# Patient Record
Sex: FEMALE | Race: WHITE | NOT HISPANIC OR LATINO | Employment: FULL TIME | URBAN - METROPOLITAN AREA
[De-identification: names, ages, dates, MRNs, and addresses within clinical notes are randomized per-mention and may not be internally consistent; named-entity substitution may affect disease eponyms.]

---

## 2017-01-05 ENCOUNTER — ALLSCRIPTS OFFICE VISIT (OUTPATIENT)
Dept: OTHER | Facility: OTHER | Age: 37
End: 2017-01-05

## 2017-03-08 ENCOUNTER — ALLSCRIPTS OFFICE VISIT (OUTPATIENT)
Dept: OTHER | Facility: OTHER | Age: 37
End: 2017-03-08

## 2017-03-08 DIAGNOSIS — R22.31 LOCALIZED SWELLING, MASS AND LUMP, RIGHT UPPER LIMB: ICD-10-CM

## 2017-03-08 DIAGNOSIS — Z12.39 ENCOUNTER FOR OTHER SCREENING FOR MALIGNANT NEOPLASM OF BREAST: ICD-10-CM

## 2017-11-29 ENCOUNTER — ALLSCRIPTS OFFICE VISIT (OUTPATIENT)
Dept: OTHER | Facility: OTHER | Age: 37
End: 2017-11-29

## 2017-11-29 ENCOUNTER — GENERIC CONVERSION - ENCOUNTER (OUTPATIENT)
Dept: OTHER | Facility: OTHER | Age: 37
End: 2017-11-29

## 2017-11-29 DIAGNOSIS — R20.8 OTHER DISTURBANCES OF SKIN SENSATION: ICD-10-CM

## 2017-11-29 DIAGNOSIS — R22.31 LOCALIZED SWELLING, MASS AND LUMP, RIGHT UPPER LIMB: ICD-10-CM

## 2017-11-30 ENCOUNTER — HOSPITAL ENCOUNTER (OUTPATIENT)
Dept: ULTRASOUND IMAGING | Facility: CLINIC | Age: 37
Discharge: HOME | End: 2017-11-30
Payer: COMMERCIAL

## 2017-11-30 ENCOUNTER — HOSPITAL ENCOUNTER (OUTPATIENT)
Dept: MAMMOGRAPHY | Facility: CLINIC | Age: 37
Discharge: HOME | End: 2017-11-30
Payer: COMMERCIAL

## 2017-11-30 DIAGNOSIS — R22.31 LOCALIZED SWELLING, MASS AND LUMP, RIGHT UPPER LIMB: ICD-10-CM

## 2017-11-30 DIAGNOSIS — Z12.39 ENCOUNTER FOR OTHER SCREENING FOR MALIGNANT NEOPLASM OF BREAST: ICD-10-CM

## 2017-11-30 PROCEDURE — G0204 DX MAMMO INCL CAD BI: HCPCS

## 2017-11-30 PROCEDURE — G0279 TOMOSYNTHESIS, MAMMO: HCPCS

## 2017-11-30 PROCEDURE — 76642 ULTRASOUND BREAST LIMITED: CPT
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
## 2017-12-08 ENCOUNTER — HOSPITAL ENCOUNTER (OUTPATIENT)
Dept: ULTRASOUND IMAGING | Facility: HOSPITAL | Age: 37
Discharge: HOME | End: 2017-12-08
Payer: COMMERCIAL

## 2017-12-08 DIAGNOSIS — R20.8 OTHER DISTURBANCES OF SKIN SENSATION: ICD-10-CM

## 2017-12-08 PROCEDURE — 76705 ECHO EXAM OF ABDOMEN: CPT

## 2017-12-11 ENCOUNTER — GENERIC CONVERSION - ENCOUNTER (OUTPATIENT)
Dept: OTHER | Facility: OTHER | Age: 37
End: 2017-12-11

## 2018-01-03 ENCOUNTER — ALLSCRIPTS OFFICE VISIT (OUTPATIENT)
Dept: OTHER | Facility: OTHER | Age: 38
End: 2018-01-03
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
## 2018-01-10 NOTE — MISCELLANEOUS
Message   Recorded as Task   Date: 11/29/2017 11:42 AM, Created By: Viktoria Chiang   Task Name: Follow Up   Assigned To: Viktoria Chiang   Regarding Patient: Radha Fowler, Status: Complete   Comment:    Viktoria Chiang - 29 Nov 2017 11:42 AM     TASK CREATED  Pt has an appt for screening mammo and r u/s tomorrow at  [facility]  Carlos Santana (at  [facility]) wants to know if I can change it to diag, bilat mammo  ? You did see pt for this - back in Mar  May I change rx? This was ordered 7 mos ago  Thanks   Katelyn Scott - 29 Nov 2017 12:27 PM     TASK REPLIED TO: Previously Assigned To Katelyn Scott  yes it's okay to change it  thx   Viktoria Chiang - 29 Nov 2017 12:41 PM     TASK EDITED  Rx for bilat diag mammo sent to  [facility]   Viktoria Chiang - 29 Nov 2017 12:41 PM     TASK COMPLETED        Active Problems    1  Allergic rhinitis (477 9) (J30 9)   2  Allergy (995 3) (T78 40XA)   3  Asthma (493 90) (J45 909)   4  Axillary lump, right (782 2) (R22 31)   5  Breast cancer screening, high risk patient (V76 11) (Z12 31)   6  Encounter for gynecological examination without abnormal finding (V72 31) (Z01 419)   7  Herpes simplex infection (054 9) (B00 9)   8  Irregular bleeding (626 4) (N92 6)   9  Pain at surgical incision (782 0) (R20 8)   10  Pruritus of skin (698 9) (L29 9)   11  Skin tag of vulva (624 8) (N90 89)    Current Meds   1  Advair Diskus 250-50 MCG/DOSE Inhalation Aerosol Powder Breath Activated; AEPB   Inhal X 90;   Therapy: 49IAS8464 to (Evaluate:16Nov2016)  Requested for: 64DPH5692; Last   Rx:40Unu7776 Ordered    Allergies    1  Demerol SOLN    2  Other   3  Pollen   4  Seasonal    Plan  Axillary lump, right    · MAMMO DIAGNOSTIC BILATERAL W CAD; Status:Hold For - Scheduling,Retrospective  Authorization; Requested for:29Nov2017;     Signatures   Electronically signed by :  Elvis [place] [employer], ; Nov 29 2017 12:44PM EST                       (Author)

## 2018-01-12 VITALS — DIASTOLIC BLOOD PRESSURE: 70 MMHG | SYSTOLIC BLOOD PRESSURE: 100 MMHG

## 2018-01-14 VITALS — DIASTOLIC BLOOD PRESSURE: 72 MMHG | SYSTOLIC BLOOD PRESSURE: 120 MMHG

## 2018-01-22 ENCOUNTER — GENERIC CONVERSION - ENCOUNTER (OUTPATIENT)
Dept: OTHER | Facility: OTHER | Age: 38
End: 2018-01-22

## 2018-01-22 VITALS — SYSTOLIC BLOOD PRESSURE: 120 MMHG | DIASTOLIC BLOOD PRESSURE: 66 MMHG | HEIGHT: 66 IN

## 2018-01-23 NOTE — MISCELLANEOUS
Message   Recorded as Task   Date: 12/11/2017 07:34 AM, Created By: Cameron Nieto   Task Name: Go to Result   Assigned To: Tito Rodriguez   Regarding Patient: Leydi Fitzpatrick, Status: In Progress   Comment:    Katelyn Scott - 11 Dec 2017 7:34 AM     TASK CREATED  Please let Vinnie Monroy know that her ultrasound shows no endometrioma at the site of her pain; if she wishes we can bring her in with Dr Mercy Mishra for an injection of her incisional site  Henny Olvera - 11 Dec 2017 7:57 AM     TASK IN PROGRESS   Henny Olvera - 11 Dec 2017 8:14 AM     TASK EDITED  [facility] for pt to cb       ext: [id number]   Henny Olvera - 11 Dec 2017 8:38 AM     TASK EDITED  Patient returned call - she is aware of the u/s result  Is interested in getting a shot at her incisional site  Earliest could get her in was not till 01/03 - ok with pt  Active Problems    1  Allergic rhinitis (477 9) (J30 9)   2  Allergy (995 3) (T78 40XA)   3  Asthma (493 90) (J45 909)   4  Axillary lump, right (782 2) (R22 31)   5  Breast cancer screening, high risk patient (V76 11) (Z12 31)   6  Encounter for gynecological examination without abnormal finding (V72 31) (Z01 419)   7  Herpes simplex infection (054 9) (B00 9)   8  Irregular bleeding (626 4) (N92 6)   9  Pain at surgical incision (782 0) (R20 8)   10  Pruritus of skin (698 9) (L29 9)   11  Skin tag of vulva (624 8) (N90 89)    Current Meds   1  Advair Diskus 250-50 MCG/DOSE Inhalation Aerosol Powder Breath Activated; AEPB   Inhal X 90;   Therapy: 76ATL1124 to (Evaluate:10Vqi5960)  Requested for: 81GFA5703; Last   Rx:[id number] Ordered    Allergies    1  Demerol SOLN    2  Other   3  Pollen   4   Seasonal    Signatures   Electronically signed by : Flores Wick, ; Dec 11 2017  8:38AM EST                       (Author)

## 2018-01-24 NOTE — MISCELLANEOUS
Message   Recorded as Task   Date: 01/17/2018 10:17 AM, Created By: Juanjo Ring   Task Name: Follow Up   Assigned To: Marshal Raya   Regarding Patient: Kieran Johnson, Status: In Progress   Comment:    Juanjo Ring - 17 Jan 2018 10:17 AM     TASK CREATED  On 1/3 pt got inj for pain in c section scar  Pain better for 1 week - now worse than ever  "Pain is different" though  She cannot touch area - seems "on fire"  Constant   No bulges around it - feels no hernia  What to do? [address] you  Martha Dy - 18 Jan 2018 12:16 PM     TASK EDITED  pt called again - has not heard back from us - she said pain was bad yesterday 1/17 - not as bad today but she taking things very slow - would like a return call  Christopher Morse - 19 Jan 2018 10:54 AM     TASK REPLIED TO: Previously Assigned To FID3  I will send RX for anti-inflammatory  Juanjo Ring - 22 Jan 2018 8:54 AM     TASK EDITED   Juanjo Ring - 22 Jan 2018 8:55 AM     TASK IN PROGRESS   Juanjo Ring - 22 Jan 2018 8:58 AM     TASK EDITED  lmtcb   Juanjo Ring - 22 Jan 2018 9:06 AM     TASK EDITED  pt said she is better this week - still has pain  Will try anti inflammatory also        Active Problems    1  Allergic rhinitis (477 9) (J30 9)   2  Allergy (995 3) (T78 40XA)   3  Asthma (493 90) (J45 909)   4  Axillary lump, right (782 2) (R22 31)   5  Breast cancer screening, high risk patient (V76 11) (Z12 31)   6  Encounter for gynecological examination without abnormal finding (V72 31) (Z01 419)   7  Herpes simplex infection (054 9) (B00 9)   8  Irregular bleeding (626 4) (N92 6)   9  Neurapraxia (957 9) (T14 8XXA)   10  Pain at surgical incision (782 0) (R20 8)   11  Pruritus of skin (698 9) (L29 9)   12  Skin tag of vulva (624 8) (N90 89)    Current Meds   1   Advair Diskus 250-50 MCG/DOSE Inhalation Aerosol Powder Breath Activated; AEPB   Inhal X 90;   Therapy: 92CSY1260 to (Evaluate:96Fab3670)  Requested for: 42PDH1698; Last   Rx:67Cyj3405 Ordered   2  MethylPREDNISolone Sodium Succ 40 MG Injection Solution Reconstituted   (SOLU-medrol); USE AS DIRECTED; To Be Done: 55DXZ0962; Status: HOLD   FOR - Administration Ordered   3  Naproxen 500 MG Oral Tablet; TAKE 1 TABLET EVERY 12 HOURS AS NEEDED; Therapy: 38GIJ7239 to Misael Jerry)  Requested for: 74QAP1261; Last   Rx:19Jan2018 Ordered    Allergies    1  Demerol SOLN    2  Other   3  Pollen   4  Seasonal    Signatures   Electronically signed by :  Randy Giraldo, ; Jan 22 2018  9:06AM EST                       (Author)

## 2018-02-02 DIAGNOSIS — J45.20 MILD INTERMITTENT ASTHMA WITHOUT COMPLICATION: Primary | ICD-10-CM

## 2018-02-02 RX ORDER — NAPROXEN 500 MG/1
1 TABLET ORAL EVERY 12 HOURS PRN
COMMUNITY
Start: 2018-01-19 | End: 2018-03-26 | Stop reason: SDUPTHER

## 2018-02-02 RX ORDER — ALBUTEROL SULFATE 2.5 MG/3ML
2.5 SOLUTION RESPIRATORY (INHALATION) EVERY 6 HOURS PRN
Qty: 75 ML | Refills: 0 | OUTPATIENT
Start: 2018-02-02 | End: 2018-05-14

## 2018-02-02 NOTE — TELEPHONE ENCOUNTER
DR POWELL'S Pt   PT HAS NOT BEEN SEEN FOR A YEAR  SHE CALLED THIS AM REQUESTING A REFILL ON HER ALBUTEROL  [place]HE IS GO[place]    CAN WE SEND A MONTH SUPPLY OVER TO [facility] ON [facility]

## 2018-02-21 ENCOUNTER — APPOINTMENT (EMERGENCY)
Dept: RADIOLOGY | Facility: HOSPITAL | Age: 38
End: 2018-02-21
Payer: COMMERCIAL

## 2018-02-21 ENCOUNTER — HOSPITAL ENCOUNTER (EMERGENCY)
Facility: HOSPITAL | Age: 38
Discharge: HOME | End: 2018-02-21
Admitting: EMERGENCY MEDICINE
Payer: COMMERCIAL

## 2018-02-21 VITALS
SYSTOLIC BLOOD PRESSURE: 120 MMHG | TEMPERATURE: 98.8 F | BODY MASS INDEX: 30.83 KG/M2 | DIASTOLIC BLOOD PRESSURE: 59 MMHG | RESPIRATION RATE: 16 BRPM | HEART RATE: 79 BPM | OXYGEN SATURATION: 98 % | WEIGHT: 191 LBS

## 2018-02-21 DIAGNOSIS — S16.1XXA STRAIN OF NECK MUSCLE, INITIAL ENCOUNTER: Primary | ICD-10-CM

## 2018-02-21 PROCEDURE — 99284 EMERGENCY DEPT VISIT MOD MDM: CPT

## 2018-02-21 PROCEDURE — 72080 X-RAY EXAM THORACOLMB 2/> VW: CPT

## 2018-02-21 PROCEDURE — 72040 X-RAY EXAM NECK SPINE 2-3 VW: CPT

## 2018-02-21 RX ORDER — LIDOCAINE 50 MG/G
1 PATCH TOPICAL DAILY
Qty: 10 PATCH | Refills: 0 | Status: SHIPPED | OUTPATIENT
Start: 2018-02-21 | End: 2018-03-26

## 2018-02-21 RX ORDER — ACETAMINOPHEN 325 MG/1
975 TABLET ORAL ONCE
Status: COMPLETED | OUTPATIENT
Start: 2018-02-21 | End: 2018-02-21

## 2018-02-21 RX ORDER — NAPROXEN 500 MG/1
500 TABLET ORAL 2 TIMES DAILY WITH MEALS
Qty: 14 TABLET | Refills: 0 | Status: SHIPPED | OUTPATIENT
Start: 2018-02-21 | End: 2018-03-01 | Stop reason: SDUPTHER

## 2018-02-21 RX ORDER — CYCLOBENZAPRINE HCL 5 MG
5 TABLET ORAL 3 TIMES DAILY PRN
Qty: 15 TABLET | Refills: 0 | Status: SHIPPED | OUTPATIENT
Start: 2018-02-21 | End: 2018-03-01

## 2018-02-21 RX ADMIN — ACETAMINOPHEN 975 MG: 325 TABLET, FILM COATED ORAL at 18:11
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
## 2018-02-22 NOTE — DISCHARGE INSTRUCTIONS
Cervical Strain   WHAT YOU NEED TO KNOW:   A cervical strain is a stretched or torn muscle or tendon in your neck  Tendons are strong tissues that connect muscles to bones  Common causes of cervical strains include a car accident, a fall, or a sports injury  DISCHARGE INSTRUCTIONS:   Return to the emergency department if:   · You have pain or numbness from your shoulder down to your hand  · You have problems with your vision, hearing, or balance  · You feel confused or cannot concentrate  · You have problems with movement and strength  Contact your healthcare provider if:   · You have increased swelling or pain in your neck  · You have questions or concerns about your condition or care  Medicines: You may need any of the following:  · Acetaminophen  decreases pain and fever  It is available without a doctor's order  Ask how much to take and how often to take it  Follow directions  Read the labels of all other medicines you are using to see if they also contain acetaminophen, or ask your doctor or pharmacist  Acetaminophen can cause liver damage if not taken correctly  Do not use more than 4 grams (4,000 milligrams) total of acetaminophen in one day  · NSAIDs , such as ibuprofen, help decrease swelling, pain, and fever  This medicine is available with or without a doctor's order  NSAIDs can cause stomach bleeding or kidney problems in certain people  If you take blood thinner medicine, always ask your healthcare provider if NSAIDs are safe for you  Always read the medicine label and follow directions  · Muscle relaxers  help decrease pain and muscle spasms  · Prescription pain medicine  may be given  Ask your healthcare provider how to take this medicine safely  Some prescription pain medicines contain acetaminophen  Do not take other medicines that contain acetaminophen without talking to your healthcare provider  Too much acetaminophen may cause liver damage   Prescription pain medicine may cause constipation  Ask your healthcare provider how to prevent or treat constipation  · Take your medicine as directed  Contact your healthcare provider if you think your medicine is not helping or if you have side effects  Tell him or her if you are allergic to any medicine  Keep a list of the medicines, vitamins, and herbs you take  Include the amounts, and when and why you take them  Bring the list or the pill bottles to follow-up visits  Carry your medicine list with you in case of an emergency  Manage your symptoms:   · Apply heat  on your neck for 15 to 20 minutes, 4 to 6 times a day or as directed  Heat helps decrease pain, stiffness, and muscle spasms  · Begin gentle neck exercises  as soon as you can move your neck without pain  Exercises will help decrease stiffness and improve the strength and movement of your neck  Ask your healthcare provider what kind of exercises you should do  · Gradually return to your usual activities as directed  Stop if you have pain  Avoid activities that can cause more damage to your neck, such as heavy lifting or strenuous exercise  · Sleep without a pillow  to help decrease pain  Instead, roll a small towel tightly and place it under your neck  · Go to physical therapy as directed  A physical therapist teaches you exercises to help improve movement and strength, and to decrease pain  Prevent neck injury:   · Drive safely  Make sure everyone in your car wears a seatbelt  A seatbelt can save your life if you are in an accident  Do not use your cell phone when you are driving  This could distract you and cause an accident  Pull over if you need to make a call or send a text message  · Wear helmets, lifejackets, and protective gear  Always wear a helmet when you ride a bike or motorcycle, go skiing, or play sports that could cause a head injury  Wear protective equipment when you play sports   Wear a lifejacket when you are on a boat or doing water sports  Follow up with your healthcare provider as directed: You may be referred to an orthopedist or physical therapies  Write down your questions so you remember to ask them during your visits  © 2017 [address] [employer] Information is for End User's use only and may not be sold, redistributed or otherwise used for commercial purposes  All illustrations and images included in CareNotes® are the copyrighted property of [employer]  or [address]  The above information is an  only  It is not intended as medical advice for individual conditions or treatments  Talk to your doctor, nurse or pharmacist before following any medical regimen to see if it is safe and effective for you  Motor Vehicle Accident   WHAT YOU NEED TO KNOW:   A motor vehicle accident (MVA) can cause injury from the impact or from being thrown around inside the car  You may have a bruise on your abdomen, chest, or neck from the seatbelt  You may also have pain in your face, neck, or back  You may have pain in your knee, hip, or thigh if your body hits the dash or the steering wheel  Muscle pain is commonly worse 1 to 2 days after an MVA  DISCHARGE INSTRUCTIONS:   Call 911 if:   · You have new or worsening chest pain or shortness of breath  Return to the emergency department if:   · You have new or worsening pain in your abdomen  · You have nausea and vomiting that does not get better  · You have a severe headache  · You have weakness, tingling, or numbness in your arms or legs  · You have new or worsening pain that makes it hard for you to move  Contact your healthcare provider if:   · You have pain that develops 2 to 3 days after the MVA  · You have questions or concerns about your condition or care  Medicines:   · Pain medicine: You may be given medicine to take away or decrease pain  Do not wait until the pain is severe before you take your medicine      · NSAIDs , such as ibuprofen, help decrease swelling, pain, and fever  This medicine is available with or without a doctor's order  NSAIDs can cause stomach bleeding or kidney problems in certain people  If you take blood thinner medicine, always ask if NSAIDs are safe for you  Always read the medicine label and follow directions  Do not give these medicines to children under 10months of age without direction from your child's healthcare provider  · Take your medicine as directed  Contact your healthcare provider if you think your medicine is not helping or if you have side effects  Tell him of her if you are allergic to any medicine  Keep a list of the medicines, vitamins, and herbs you take  Include the amounts, and when and why you take them  Bring the list or the pill bottles to follow-up visits  Carry your medicine list with you in case of an emergency  Follow up with your healthcare provider as directed:  Write down your questions so you remember to ask them during your visits  Safety tips:   · Always wear your seatbelt  This will help reduce serious injury from an MVA  · Use child safety seats  Your child needs to ride in a child safety seat made for his age, height, and weight  Ask your healthcare provider for more information about child safety seats  · Decrease speed  Drive the speed limit to reduce your risk for an MVA  · Do not drive if you are tired  You will react more slowly when you are tired  The slowed reaction time will increase your risk for an MVA  · Do not talk or text on your cell phone while you drive  You cannot respond fast enough in an emergency if you are distracted by texts or conversations  · Do not drink and drive  Use a designated   Call a taxi or get a ride home with someone if you have been drinking  Do not let your friends drive if they have been drinking alcohol  · Do not use illegal drugs and drive    You may be more tired or take risks that you normally would not take  Do not drive after you take prescription medicines that make you sleepy  Self-care:   · Use ice and heat  Ice helps decrease swelling and pain  Ice may also help prevent tissue damage  Use an ice pack, or put crushed ice in a plastic bag  Cover it with a towel and apply to your injured area for 15 to 20 minutes every hour, or as directed  After 2 days, use a heating pad on your injured area  Use heat as directed  · Gently stretch  Use gentle exercises to stretch your muscles after an MVA  Ask your healthcare provider for exercises you can do  © 2017 [facility] Information is for End User's use only and may not be sold, redistributed or otherwise used for commercial purposes  All illustrations and images included in CareNotes® are the copyrighted property of [employer]  or [address]  The above information is an  only  It is not intended as medical advice for individual conditions or treatments  Talk to your doctor, nurse or pharmacist before following any medical regimen to see if it is safe and effective for you

## 2018-03-01 ENCOUNTER — OFFICE VISIT (OUTPATIENT)
Dept: FAMILY MEDICINE CLINIC | Facility: CLINIC | Age: 38
End: 2018-03-01
Payer: COMMERCIAL

## 2018-03-01 VITALS
HEIGHT: 55 IN | DIASTOLIC BLOOD PRESSURE: 70 MMHG | SYSTOLIC BLOOD PRESSURE: 100 MMHG | WEIGHT: 188.4 LBS | BODY MASS INDEX: 43.6 KG/M2

## 2018-03-01 DIAGNOSIS — S46.812A STRAIN OF LEFT TRAPEZIUS MUSCLE, INITIAL ENCOUNTER: ICD-10-CM

## 2018-03-01 DIAGNOSIS — S16.1XXA STRAIN OF NECK MUSCLE, INITIAL ENCOUNTER: ICD-10-CM

## 2018-03-01 DIAGNOSIS — S29.019A THORACIC MYOFASCIAL STRAIN, INITIAL ENCOUNTER: Primary | ICD-10-CM

## 2018-03-01 PROCEDURE — 99214 OFFICE O/P EST MOD 30 MIN: CPT | Performed by: FAMILY MEDICINE

## 2018-03-01 RX ORDER — METHOCARBAMOL 500 MG/1
500 TABLET, FILM COATED ORAL 4 TIMES DAILY
Qty: 120 TABLET | Refills: 0 | Status: SHIPPED | OUTPATIENT
Start: 2018-03-01 | End: 2018-05-14
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
## 2018-03-06 ENCOUNTER — EVALUATION (OUTPATIENT)
Dept: PHYSICAL THERAPY | Facility: CLINIC | Age: 38
End: 2018-03-06
Payer: COMMERCIAL

## 2018-03-06 DIAGNOSIS — S29.019A THORACIC MYOFASCIAL STRAIN, INITIAL ENCOUNTER: ICD-10-CM

## 2018-03-06 DIAGNOSIS — S46.812A STRAIN OF LEFT TRAPEZIUS MUSCLE, INITIAL ENCOUNTER: ICD-10-CM

## 2018-03-06 DIAGNOSIS — S16.1XXA STRAIN OF NECK MUSCLE, INITIAL ENCOUNTER: ICD-10-CM

## 2018-03-06 PROCEDURE — G8981 BODY POS CURRENT STATUS: HCPCS | Performed by: PHYSICAL MEDICINE & REHABILITATION

## 2018-03-06 PROCEDURE — G8982 BODY POS GOAL STATUS: HCPCS | Performed by: PHYSICAL MEDICINE & REHABILITATION

## 2018-03-06 PROCEDURE — 97161 PT EVAL LOW COMPLEX 20 MIN: CPT | Performed by: PHYSICAL MEDICINE & REHABILITATION
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
## 2018-03-09 ENCOUNTER — OFFICE VISIT (OUTPATIENT)
Dept: OBGYN CLINIC | Facility: CLINIC | Age: 38
End: 2018-03-09
Payer: COMMERCIAL

## 2018-03-09 VITALS
HEIGHT: 66 IN | SYSTOLIC BLOOD PRESSURE: 108 MMHG | WEIGHT: 189 LBS | BODY MASS INDEX: 30.37 KG/M2 | DIASTOLIC BLOOD PRESSURE: 64 MMHG

## 2018-03-09 DIAGNOSIS — Z01.419 ENCNTR FOR GYN EXAM (GENERAL) (ROUTINE) W/O ABN FINDINGS: Primary | ICD-10-CM

## 2018-03-09 DIAGNOSIS — Z12.31 ENCOUNTER FOR SCREENING MAMMOGRAM FOR MALIGNANT NEOPLASM OF BREAST: ICD-10-CM

## 2018-03-09 PROCEDURE — S0612 ANNUAL GYNECOLOGICAL EXAMINA: HCPCS | Performed by: PHYSICIAN ASSISTANT

## 2018-03-09 NOTE — PROGRESS NOTES
Ev Martinell [facility]  1980      CC:  Yearly exam    S:  42 y o  female here for yearly exam  Her cycles are regular, not heavy or crampy  She is sexually active  She uses tubal ligation for contraception  Her daughter is now 2 and is doing occupational therapy for some sensory issues and speech therapy and is doing well with this  Last Pap 10/24/14 neg/neg  Last Mammo never      Current Outpatient Prescriptions:     methocarbamol (ROBAXIN) 500 mg tablet, Take 1 tablet (500 mg total) by mouth 4 (four) times a day, Disp: 120 tablet, Rfl: 0    naproxen (NAPROSYN) 500 mg tablet, Take 1 tablet by mouth every 12 (twelve) hours as needed, Disp: , Rfl:     albuterol (2 5 mg/3 mL) 0 083 % nebulizer solution, Take 3 mL (2 5 mg total) by nebulization every 6 (six) hours as needed for wheezing, Disp: 75 mL, Rfl: 0    fluticasone-salmeterol (ADVAIR DISKUS) 250-50 mcg/dose inhaler, Inhale, Disp: , Rfl:     lidocaine (LIDODERM) 5 %, Place 1 patch on the skin daily Remove & Discard patch within 12 hours or as directed by MD, Disp: 10 patch, Rfl: 0  Social History     Social History    Marital status: /Civil Union     Spouse name: N/A    Number of children: N/A    Years of education: N/A     Occupational History    Not on file       Social History Main Topics    Smoking status: Never Smoker    Smokeless tobacco: Never Used      Comment: former smoker (as per allscripts)    Alcohol use No    Drug use: No    Sexual activity: Yes     Partners: Male     Birth control/ protection: Female Sterilization     Other Topics Concern    Not on file     Social History Narrative    Always uses seat belt    Denied: history of daily caffeinated coffee consumption    Exercises strenuously less than 3 times a week     Family History   Problem Relation Age of Onset    Breast cancer Mother     Cancer Father     Breast cancer Maternal Grandmother     Breast cancer Paternal Grandmother     Breast cancer Paternal Aunt Past Medical History:   Diagnosis Date    Allergic rhinitis     Asthma     Complex ovarian cyst     last assessed: 7/9/2014    Condyloma acuminatum     Dysmenorrhea     last assessed: 9/20/2013    H/O human papillomavirus infection     Migraine     Palpitations     last assessed: 12/20/2013        O:  Blood pressure 108/64, height 5' 6 14" (1 68 m), weight 85 7 kg (189 lb), last menstrual period 02/19/2018  Patient appears well and is not in distress  Neck is supple without masses  Breasts are symmetrical without mass, tenderness, nipple discharge, skin changes or adenopathy  Abdomen is soft and nontender without masses  External genitals are normal without lesions or rashes  Vagina is normal without discharge or bleeding  Cervix is normal without discharge or lesion  Uterus is normal, mobile, nontender without palpable mass  Adnexa are normal, nontender, without palpable mass  A:  Yearly exam      P:   Pap due 2019   Mammo slip provided    RTO one year for yearly exam or sooner as needed

## 2018-03-12 ENCOUNTER — OFFICE VISIT (OUTPATIENT)
Dept: PHYSICAL THERAPY | Facility: CLINIC | Age: 38
End: 2018-03-12
Payer: COMMERCIAL

## 2018-03-12 DIAGNOSIS — S16.1XXA STRAIN OF NECK MUSCLE, INITIAL ENCOUNTER: ICD-10-CM

## 2018-03-12 DIAGNOSIS — S29.019A THORACIC MYOFASCIAL STRAIN, INITIAL ENCOUNTER: Primary | ICD-10-CM

## 2018-03-12 DIAGNOSIS — S46.812A STRAIN OF LEFT TRAPEZIUS MUSCLE, INITIAL ENCOUNTER: ICD-10-CM

## 2018-03-12 PROCEDURE — 97110 THERAPEUTIC EXERCISES: CPT | Performed by: PHYSICAL MEDICINE & REHABILITATION

## 2018-03-12 PROCEDURE — 97140 MANUAL THERAPY 1/> REGIONS: CPT | Performed by: PHYSICAL MEDICINE & REHABILITATION

## 2018-03-12 NOTE — PROGRESS NOTES
Daily Note     Today's date: 3/12/2018  Patient name: Lizbeth Tian  : 1980  MRN: [id number]  Referring provider: Air Ramos DO  Dx:   Encounter Diagnosis     ICD-10-CM    1  Thoracic myofascial strain, initial encounter S29 019A    2  Strain of left trapezius muscle, initial encounter S46 812A    3  Strain of neck muscle, initial encounter S16  1XXA                   Subjective: Pt reports 7/10 pain in the left cervical region into the L shoulder  Objective: See treatment diary below  Daily Treatment Diary      Manual   3/12                     C1/C2 lateral press (when pain resides)  NP                     T/S P/A (t3/4-t5/6)  NC                     Cervical upslides  NC                     STM (UT, paraspinals)  NC                     Scapular distraction NC            Suboccipital inhibition  NC                           Exercise Diary   3/12                     Pulleys  5'                     Doorway stretch  3 x 30"                     Levator stretch*  3 x 30"                     UT stretch*  3 x 30"                     SCM stretch*  3 x 30"                     Scalene stretch*  3 x 30"                     Chin tucks c biofeedback  NV                     TB ext  OTB 15, 3" hold                     Thoracic ext (chair/towel)  3" hold, x 5                      open books  5 x 5"                                                                                                                                                                                                                                                                           Modalities                        CP/MHP (PRN)                                                                          Pts sx relieved with thoracic extension, TB extensions are challenging resulting in the need for a rest break  Assessment: Tolerated treatment well   Patient exhibited good technique with therapeutic exercises      Plan: Progress treatment as tolerated

## 2018-03-13 ENCOUNTER — OFFICE VISIT (OUTPATIENT)
Dept: PHYSICAL THERAPY | Facility: CLINIC | Age: 38
End: 2018-03-13
Payer: COMMERCIAL

## 2018-03-13 DIAGNOSIS — S46.812A STRAIN OF LEFT TRAPEZIUS MUSCLE, INITIAL ENCOUNTER: ICD-10-CM

## 2018-03-13 DIAGNOSIS — S29.019A THORACIC MYOFASCIAL STRAIN, INITIAL ENCOUNTER: Primary | ICD-10-CM

## 2018-03-13 DIAGNOSIS — S16.1XXA STRAIN OF NECK MUSCLE, INITIAL ENCOUNTER: ICD-10-CM

## 2018-03-13 PROCEDURE — 97110 THERAPEUTIC EXERCISES: CPT

## 2018-03-13 PROCEDURE — 97140 MANUAL THERAPY 1/> REGIONS: CPT

## 2018-03-13 NOTE — PROGRESS NOTES
Daily Note     Today's date: 3/13/2018  Patient name: Bessy Eller  : 1980  MRN: [id number]  Referring provider: West Caldwell DO  Dx:   Encounter Diagnosis     ICD-10-CM    1  Thoracic myofascial strain, initial encounter S29 019A    2  Strain of left trapezius muscle, initial encounter S46 812A    3  Strain of neck muscle, initial encounter S16  1XXA                   Subjective: Patient reports relief from the manual techniques of last session lasting for a few hours  This p m  Patient reports left cervical soreness/tightness radiating up into her head resulting in a Ha, as well as discomfort in the left thoracic area        Objective: See treatment diary below    Manual   3/12  3/13                   C1/C2 lateral press (when pain resides)  NP NP                   T/S P/A (t3/4-t5/6)  NC  NC                   Cervical upslides [facility]  NC                   STM (UT, paraspinals) [facility]  NC                   Scapular distraction NC NC           Suboccipital inhibition [facility]                         Exercise Diary   3/12 3/13                   Pulleys  5'  5'                   Doorway stretch  3 x 30" NP                   Levator stretch*  3 x 30" 3x  30"                   UT stretch*  3 x 30"  3x  30"                   SCM stretch*  3 x 30"  3x  30"                     Scalene stretch*  3 x 30"  3x  30"                   Chin tucks c biofeedback  NV 10x  5"                   TB ext  OTB 15, 3" hold NP                   Thoracic ext (chair/towel)  3" hold, x 5  3"x5                    open books  5 x 5"  5x5"                                                                                                                                                                                                                                                                         Modalities   3/13                     CP/MHP (PRN) CPx10'                                                                            Assessment: Patient tolerated her exercise session well  HA pain level reduced to a 4/10 and the left cervical area stiffness  Will try the doorway stretch and the TB ext exercises next visit  Plan: Continue therapy as per plan of care

## 2018-03-20 ENCOUNTER — OFFICE VISIT (OUTPATIENT)
Dept: PHYSICAL THERAPY | Facility: CLINIC | Age: 38
End: 2018-03-20
Payer: COMMERCIAL

## 2018-03-20 DIAGNOSIS — S16.1XXA STRAIN OF NECK MUSCLE, INITIAL ENCOUNTER: ICD-10-CM

## 2018-03-20 DIAGNOSIS — S29.019A THORACIC MYOFASCIAL STRAIN, INITIAL ENCOUNTER: Primary | ICD-10-CM

## 2018-03-20 DIAGNOSIS — S46.812A STRAIN OF LEFT TRAPEZIUS MUSCLE, INITIAL ENCOUNTER: ICD-10-CM

## 2018-03-20 PROCEDURE — 97110 THERAPEUTIC EXERCISES: CPT

## 2018-03-20 PROCEDURE — 97140 MANUAL THERAPY 1/> REGIONS: CPT

## 2018-03-20 NOTE — PROGRESS NOTES
Daily Note     Today's date: 3/20/2018  Patient name: Judith Veloz  : 1980  MRN: [id number]  Referring provider: Ketty Palm DO  Dx:   Encounter Diagnosis     ICD-10-CM    1  Thoracic myofascial strain, initial encounter S29 019A    2  Strain of left trapezius muscle, initial encounter S46 812A    3  Strain of neck muscle, initial encounter S16  1XXA                   Subjective: Patient reports it's early, but presently c/o pressure in the left upper traps, as well as tightness  She indicates she felt better at the last session  Objective: See treatment diary below    Manual   3/12  3/13  3/20                 C1/C2 lateral press (when pain resides)  NP NP                   T/S P/A (t3/4-t5/6)  NC  NC                   Cervical upslides  NC  NC                   STM (UT, paraspinals)  NC  [place]                 Scapular distraction NC NC           Suboccipital inhibition [facility]  release [place]                       Exercise Diary   3/12 3/13  3/20                 Pulleys  5'  5' 5'                 Doorway stretch  3 x 30" NP  20"x5                 Levator stretch*  3 x 30" 3x  30"  3x30"                 UT stretch*  3 x 30"  3x  30"  3x30"                 SCnpM stretch*  3 x 30"  3x  30"    3x30"                 Scalene stretch*  3 x 30"  3x  30"  3x30"                 Chin tucks c biofeedback  NV 10x  5"  10x5"                 TB ext  OTB 15, 3" hold NP  NP                 Thoracic ext (chair/towel)  3" hold, x 5  3"x5  3"x5                  open books  5 x 5"  5x5"  5x5"                                                                                                                                                                                                                                                                       Modalities   3/13  3/20                   CP/MHP (PRN) CPx10'  NP                                                                       Assessment: Tolerated treatment well  Patient reported feeling less tight at the end of the session  Plan: Continue therapy as per plan of care

## 2018-03-26 ENCOUNTER — OFFICE VISIT (OUTPATIENT)
Dept: FAMILY MEDICINE CLINIC | Facility: CLINIC | Age: 38
End: 2018-03-26
Payer: COMMERCIAL

## 2018-03-26 VITALS
SYSTOLIC BLOOD PRESSURE: 104 MMHG | BODY MASS INDEX: 30.98 KG/M2 | WEIGHT: 192.8 LBS | DIASTOLIC BLOOD PRESSURE: 62 MMHG | HEART RATE: 60 BPM | HEIGHT: 66 IN | RESPIRATION RATE: 14 BRPM

## 2018-03-26 DIAGNOSIS — S29.019D THORACIC MYOFASCIAL STRAIN, SUBSEQUENT ENCOUNTER: ICD-10-CM

## 2018-03-26 DIAGNOSIS — S16.1XXD STRAIN OF NECK MUSCLE, SUBSEQUENT ENCOUNTER: Primary | ICD-10-CM

## 2018-03-26 DIAGNOSIS — S46.812D STRAIN OF LEFT TRAPEZIUS MUSCLE, SUBSEQUENT ENCOUNTER: ICD-10-CM

## 2018-03-26 PROCEDURE — 99214 OFFICE O/P EST MOD 30 MIN: CPT | Performed by: FAMILY MEDICINE

## 2018-03-26 RX ORDER — NAPROXEN 500 MG/1
500 TABLET ORAL EVERY 12 HOURS PRN
Qty: 40 TABLET | Refills: 0 | Status: SHIPPED | OUTPATIENT
Start: 2018-03-26 | End: 2018-05-14

## 2018-03-26 NOTE — LETTER
March 26, 2018     Patient: Sushant Coulter   YOB: 1980   Date of Visit: 3/26/2018       To Whom it May Concern:    Sushant Coulter is under my professional care  She was seen in my office on 3/26/2018  She may return to work on Tuesday March 27, 2018  If you have any questions or concerns, please don't hesitate to call           Sincerely,          Rosalino Lockwood, DO        CC: No Recipients
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
## 2018-03-27 ENCOUNTER — OFFICE VISIT (OUTPATIENT)
Dept: PHYSICAL THERAPY | Facility: CLINIC | Age: 38
End: 2018-03-27
Payer: COMMERCIAL

## 2018-03-27 DIAGNOSIS — S29.019A THORACIC MYOFASCIAL STRAIN, INITIAL ENCOUNTER: Primary | ICD-10-CM

## 2018-03-27 DIAGNOSIS — S16.1XXA STRAIN OF NECK MUSCLE, INITIAL ENCOUNTER: ICD-10-CM

## 2018-03-27 DIAGNOSIS — S46.812A STRAIN OF LEFT TRAPEZIUS MUSCLE, INITIAL ENCOUNTER: ICD-10-CM

## 2018-03-27 PROCEDURE — 97110 THERAPEUTIC EXERCISES: CPT

## 2018-03-27 PROCEDURE — 97014 ELECTRIC STIMULATION THERAPY: CPT

## 2018-03-27 PROCEDURE — 97140 MANUAL THERAPY 1/> REGIONS: CPT

## 2018-03-27 NOTE — PROGRESS NOTES
Daily Note     Today's date: 3/27/2018  Patient name: Santino Lindsay  : 1980  MRN: [id number]  Referring provider: Erin Watson DO  Dx:   Encounter Diagnosis     ICD-10-CM    1  Thoracic myofascial strain, initial encounter S29 019A    2  Strain of left trapezius muscle, initial encounter S46 812A    3  Strain of neck muscle, initial encounter S16  1XXA                   Subjective: Patient reports she doesn't know what happened but yesterday she had a full blown migraine with left upper trap pain up into the cervical region and also into the anterior portion of the left cervical area      OBJECTIVE:    Manual   3/12  3/13  3/20  3/27               C1/C2 lateral press (whenppe pain resides)  NP NP                   T/S P/A (t3/4-t5/6)  NC  NC                   Cervical upslides  NC  NC                   STM (UT, paraspinals)  NC  [place]                 Scapular distraction NC NC           Suboccipital inhibition  [facility]  [place]      [place]                     Exercise Diary   3/12 3/13  3/20  3/27               Pulleys  5'  5' 5'  5'               Doorway stretch  3 x 30" NP  20"x5  20"x5               Levator stretch*  3 x 30" 3x  30"  3x30"  3x30"               UT stretch*  3 x 30"  3x  30"  3x30"  3x30"               SCM stretch*  3 x 30"  3x  30"    3x30"  3x30"               Scalene stretch*  3 x 30"  3x  30"  3x30"  3x30"               Chin tucks c biofeedback  NV 10x  5"  10x5"  10x5"               TB ext  OTB 15, 3" hold NP  NP NP               Thoracic ext (chair/towel)  3" hold, x 5  3"x5  3"x5  3"x5                open books  5 x 5"  5x5"  5x5"  5x5"                                                                                                                                                                                                                                                                     Modalities   3/13  3/20  3/27                 CP/MHP (PRN) CPx10'  NP  MHP                TENS      15'                                               Assessment: Patient was able to perform her exercise program without increased symptoms  STM today with MHP and TENS  Held the mobs due to increased muscular tightness and swelling  Patient reported feeling some relief at the end of her session  Plan: Continue therapy as noted in the plan of care

## 2018-03-29 ENCOUNTER — APPOINTMENT (OUTPATIENT)
Dept: PHYSICAL THERAPY | Facility: CLINIC | Age: 38
End: 2018-03-29
Payer: COMMERCIAL

## 2018-04-02 ENCOUNTER — OFFICE VISIT (OUTPATIENT)
Dept: PHYSICAL THERAPY | Facility: CLINIC | Age: 38
End: 2018-04-02
Payer: COMMERCIAL

## 2018-04-02 DIAGNOSIS — S16.1XXA STRAIN OF NECK MUSCLE, INITIAL ENCOUNTER: ICD-10-CM

## 2018-04-02 DIAGNOSIS — S29.019A THORACIC MYOFASCIAL STRAIN, INITIAL ENCOUNTER: Primary | ICD-10-CM

## 2018-04-02 DIAGNOSIS — S46.812A STRAIN OF LEFT TRAPEZIUS MUSCLE, INITIAL ENCOUNTER: ICD-10-CM

## 2018-04-02 PROCEDURE — 97110 THERAPEUTIC EXERCISES: CPT | Performed by: PHYSICAL MEDICINE & REHABILITATION

## 2018-04-02 PROCEDURE — 97140 MANUAL THERAPY 1/> REGIONS: CPT | Performed by: PHYSICAL MEDICINE & REHABILITATION

## 2018-04-02 PROCEDURE — 97112 NEUROMUSCULAR REEDUCATION: CPT | Performed by: PHYSICAL MEDICINE & REHABILITATION

## 2018-04-02 NOTE — PROGRESS NOTES
Daily Note     Today's date: 2018  Patient name: Severa Child  : 1980  MRN: [id number]  Referring provider: Jose L West DO  Dx:   Encounter Diagnosis     ICD-10-CM    1  Thoracic myofascial strain, initial encounter S29 019A    2  Strain of left trapezius muscle, initial encounter S46 812A    3  Strain of neck muscle, initial encounter S16  1XXA                   Subjective: Pt reports 4/10 pain, she states that she is feeling better than she was last week  Chief complaint is tightness and pain in the mid-thoracic region, and the L SCM         Objective: See treatment diary below  Precautions: none        Manual   3/12  3/13  3/20  3/27  4             C1/C2 lateral press (when pain resides)  NP NP                   T/S P/A (t3/4-t5/6)  NC  NC      NC             Cervical upslides  NC  NC                   STM (UT, paraspinals)  NC  [place]                 Scapular distraction NC NC                   IASTM (paraspinals, scap)     NC        Suboccipital inhibition  [facility]  release [place]       [place]                     Exercise Diary   3/12 3/13  3/20  3/27  4/2             Pulleys  5'  5' 5'  5'  5'             Doorway stretch  3 x 30" NP  20"x5  20"x5               Levator stretch*  3 x 30" 3x  30"  3x30"  3x30"               UT stretch*  3 x 30"  3x  30"  3x30"  3x30"               SCM stretch*  3 x 30"  3x  30"     3x30"  3x30"  3 x 30"             Scalene stretch*  3 x 30"  3x  30"  3x30"  3x30"               Chin tucks c biofeedback  NV 10x  5"  10x5"  10x5"               TB ext  OTB 15, 3" hold NP  NP NP               Thoracic ext (chair/towel)  3" hold, x 5  3"x5  3"x5  3"x5                open books  5 x 5"  5x5"  5x5"  5x5"  3 x 30"                                                                                                                                                                                                                                                                 Modalities   3/13  3/20  3/27                 CP/MHP (PRN) CPx10'  NP  MHP                  TENS      15'                                                     Assessment: Tolerated treatment well  Patient would benefit from continued PT and improved quality and quantity of movement post IASTM  Plan: Progress treatment as tolerated  continue IASTM

## 2018-04-04 ENCOUNTER — EVALUATION (OUTPATIENT)
Dept: PHYSICAL THERAPY | Facility: CLINIC | Age: 38
End: 2018-04-04
Payer: COMMERCIAL

## 2018-04-04 DIAGNOSIS — S46.812A STRAIN OF LEFT TRAPEZIUS MUSCLE, INITIAL ENCOUNTER: ICD-10-CM

## 2018-04-04 DIAGNOSIS — S29.019A THORACIC MYOFASCIAL STRAIN, INITIAL ENCOUNTER: Primary | ICD-10-CM

## 2018-04-04 DIAGNOSIS — S16.1XXA STRAIN OF NECK MUSCLE, INITIAL ENCOUNTER: ICD-10-CM

## 2018-04-04 PROCEDURE — 97110 THERAPEUTIC EXERCISES: CPT

## 2018-04-04 PROCEDURE — 97140 MANUAL THERAPY 1/> REGIONS: CPT

## 2018-04-04 PROCEDURE — G8984 CARRY CURRENT STATUS: HCPCS | Performed by: PHYSICAL MEDICINE & REHABILITATION

## 2018-04-04 PROCEDURE — G8985 CARRY GOAL STATUS: HCPCS | Performed by: PHYSICAL MEDICINE & REHABILITATION
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
## 2018-04-04 NOTE — PROGRESS NOTES
Daily Note     Today's date: 2018  Patient name: Nkechi Fleming  : 1980  MRN: [id number]  Referring provider: Naresh Villareral DO  Dx:   Encounter Diagnosis     ICD-10-CM    1  Thoracic myofascial strain, initial encounter S29 019A    2  Strain of left trapezius muscle, initial encounter S46 812A    3  Strain of neck muscle, initial encounter S16  1XXA        Start Time: 0740  Stop Time: 0840  Total time in clinic (min): 60 minutes    Subjective: Pt reports feeling improvements since last session  Pt states her thoracic pain is better, but continues to have neck pain and migraines         Objective: See treatment diary below  Precautions: none        Manual   3/12  3/13  3/20  3/27  4/2  4/4           C1/C2 lateral press (when pain resides)  NP NP                   T/S P/A (t3/4-t5/6)  NC  NC      NC             Cervical upslides  NC  NC                   STM (UT, paraspinals)  NC  [place]                 Scapular distraction NC NC                   IASTM (paraspinals, scap)     NC [place]       Suboccipital inhibition  [facility]  [place]       [place]                     Exercise Diary   3/12 3/13  3/20  3/27  4/2  4/4           Pulleys  5'  5' 5'  5'  5'  5'           Doorway stretch  3 x 30" NP  20"x5  20"x5               Levator stretch*  3 x 30" 3x  30"  3x30"  3x30"               UT stretch*  3 x 30"  3x  30"  3x30"  3x30"               SCM stretch*  3 x 30"  3x  30"     3x30"  3x30"  3 x 30"  3x30"           Scalene stretch*  3 x 30"  3x  30"  3x30"  3x30"               Chin tucks c biofeedback  NV 10x  5"  10x5"  10x5"               TB ext  OTB 15, 3" hold NP  NP NP               Thoracic ext (chair/towel)  3" hold, x 5  3"x5  3"x5  3"x5                open books  5 x 5"  5x5"  5x5"  5x5"  3 x 30"              rhomboid stretch           5x15"                                                                                                                                                                                                                                         Modalities   3/13  3/20  3/27                 CP/MHP (PRN) CPx10'  NP  MHP                  TENS      15'                                                     Assessment: Tolerated treatment well  Patient would benefit from continued PT and improved quality and quantity of movement post IASTM  Pt shows moderated soft tissue restrictions in Upper, mid, and low trap, rhomboids and paraspinals with IASTM  Pt continues to work towards goals of increased ROM and decreased pain  Please refer to RE with today's date for further assessment and plan  Plan: Progress treatment as tolerated  continue IASTM

## 2018-04-10 ENCOUNTER — OFFICE VISIT (OUTPATIENT)
Dept: PHYSICAL THERAPY | Facility: CLINIC | Age: 38
End: 2018-04-10
Payer: COMMERCIAL

## 2018-04-10 DIAGNOSIS — S46.812A STRAIN OF LEFT TRAPEZIUS MUSCLE, INITIAL ENCOUNTER: ICD-10-CM

## 2018-04-10 DIAGNOSIS — S29.019A THORACIC MYOFASCIAL STRAIN, INITIAL ENCOUNTER: Primary | ICD-10-CM

## 2018-04-10 DIAGNOSIS — S16.1XXA STRAIN OF NECK MUSCLE, INITIAL ENCOUNTER: ICD-10-CM

## 2018-04-10 PROCEDURE — 97110 THERAPEUTIC EXERCISES: CPT

## 2018-04-10 PROCEDURE — 97150 GROUP THERAPEUTIC PROCEDURES: CPT

## 2018-04-10 PROCEDURE — 97140 MANUAL THERAPY 1/> REGIONS: CPT

## 2018-04-10 NOTE — PROGRESS NOTES
Daily Note     Today's date: 4/10/2018  Patient name: Rodrigo Garsia  : 1980  MRN: [id number]  Referring provider: Daron Hannon DO  Dx:   Encounter Diagnosis     ICD-10-CM    1  Thoracic myofascial strain, initial encounter S29 019A    2  Strain of left trapezius muscle, initial encounter S46 812A    3  Strain of neck muscle, initial encounter S16  1XXA      SUBJECTIVE:  Patient reports a posterior HA at a pain level 5/10 in addition to tightness      Objective: See treatment diary below    Manual   3/12  3/13  3/20  3/27  4/2  4/4  4/10         C1/C2 lateral press (when pain resides)  NP NP                   T/S P/A (t3/4-t5/6)  NC  NC      NC             Cervical upslides  NC  NC                   STM (UT, paraspinals)  NC  [place]                 Scapular distraction NC NC                   IASTM (paraspinals, scap)     NC [place]      Suboccipital inhibition  [facility]                     Exercise Diary   3/12 3/13  3/20  3/27  4/2  4/4  4/10         Pulleys  5'  5' 5'  5'  5'  5'  5'         Doorway stretch  3 x 30" NP  20"x5  20"x5     20"x5         Levator stretch*  3 x 30" 3x  30"  3x30"  3x30"     3x30"         UT stretch*  3 x 30"  3x  30"  3x30"  3x30"      3x30"         SCM stretch*  3 x 30"  3x  30"     3x30"  3x30"  3 x 30"  3x30"  3x30"         Scalene stretch*  3 x 30"  3x  30"  3x30"  3x30"      3x30"         Chin tucks c biofeedback  NV 10x  5"  10x5"  10x5"      10x5"  No Bio         TB ext  OTB 15, 3" hold NP  NP NP      NP         Thoracic ext (chair/towel)  3" hold, x 5  3"x5  3"x5  3"x5      NP/No  Affect          open books  5 x 5"  5x5"  5x5"  5x5"  3 x 30"    3x30"          rhomboid stretch           5x15"  5x15"                                                                                                                                                                                                                                       Modalities   3/13  3/20  3/27  4/10               CP/MHP (PRN) CPx10'  NP  MHP  DECLIN                TENS      15'  DECLIN                                                 Assessment: Tolerated treatment well  Patient reported relief after the [place]  Patient would benefit from continued therapy  Plan: Continue therapy as tolerated per plan of care

## 2018-04-12 ENCOUNTER — OFFICE VISIT (OUTPATIENT)
Dept: PHYSICAL THERAPY | Facility: CLINIC | Age: 38
End: 2018-04-12
Payer: COMMERCIAL

## 2018-04-12 DIAGNOSIS — S29.019A THORACIC MYOFASCIAL STRAIN, INITIAL ENCOUNTER: Primary | ICD-10-CM

## 2018-04-12 DIAGNOSIS — S16.1XXA STRAIN OF NECK MUSCLE, INITIAL ENCOUNTER: ICD-10-CM

## 2018-04-12 DIAGNOSIS — S46.812A STRAIN OF LEFT TRAPEZIUS MUSCLE, INITIAL ENCOUNTER: ICD-10-CM

## 2018-04-12 PROCEDURE — 97140 MANUAL THERAPY 1/> REGIONS: CPT | Performed by: PHYSICAL MEDICINE & REHABILITATION

## 2018-04-12 PROCEDURE — 97110 THERAPEUTIC EXERCISES: CPT | Performed by: PHYSICAL MEDICINE & REHABILITATION

## 2018-04-12 NOTE — PROGRESS NOTES
Daily Note     Today's date: 2018  Patient name: Severa Child  : 1980  MRN: [id number]  Referring provider: Jose L West DO  Dx:   Encounter Diagnosis     ICD-10-CM    1  Thoracic myofascial strain, initial encounter S29 019A    2  Strain of left trapezius muscle, initial encounter S46 812A    3  Strain of neck muscle, initial encounter S16  1XXA        Start Time: 1600  Stop Time: 1645  Total time in clinic (min): 45 minutes    Subjective: Pt reports that she is feeling better today, some slight discomfort in the R SCM  She states that she felt better after last tx         Objective: See treatment diary below  Precautions: none     Manual   3/12  3/13  3/20  3/27  4/2  4/4  4/10         C1/C2 lateral press (when pain resides)  NP NP                   T/S P/A (t3/4-t5/6)  NC  NC      NC             Cervical upslides  NC  NC                   STM (UT, paraspinals)  NC  [place]                 Scapular distraction NC NC                   IASTM (paraspinals, scap)         [place]         Suboccipital inhibition  [facility]  [place]       [place]                     Exercise Diary   3/12 3/13  3/20  3/27  4/2  4/4  4/10         Pulleys  5'  5' 5'  5'  5'  5'  5'         Doorway stretch  3 x 30" NP  20"x5  20"x5     20"x5         Levator stretch*  3 x 30" 3x  30"  3x30"  3x30"     3x30"         UT stretch*  3 x 30"  3x  30"  3x30"  3x30"      3x30"         SCM stretch*  3 x 30"  3x  30"     3x30"  3x30"  3 x 30"  3x30"  3x30"         Scalene stretch*  3 x 30"  3x  30"  3x30"  3x30"      3x30"         Chin tucks c biofeedback  NV 10x  5"  10x5"  10x5"      10x5"  No Bio         TB ext  OTB 15, 3" hold NP  NP NP      NP         Thoracic ext (chair/towel)  3" hold, x 5  3"x5  3"x5  3"x5      NP/No  Affect          open books  5 x 5"  5x5"  5x5"  5x5"  3 x 30"    3x30"          rhomboid stretch           5x15"  5x15"                                                                                                                                                                                                                                       Modalities   3/13  3/20  3/27  4/10               CP/MHP (PRN) CPx10'  NP  MHP  DECLIN                TENS      15'  DECLIN                                                      Assessment: Tolerated treatment well  Patient has palpable trigger points in the R SCM, sx relieved post TPR and STM  Plan: Progress treatment as tolerated

## 2018-04-17 ENCOUNTER — OFFICE VISIT (OUTPATIENT)
Dept: PHYSICAL THERAPY | Facility: CLINIC | Age: 38
End: 2018-04-17
Payer: COMMERCIAL

## 2018-04-17 DIAGNOSIS — S16.1XXA STRAIN OF NECK MUSCLE, INITIAL ENCOUNTER: ICD-10-CM

## 2018-04-17 DIAGNOSIS — S46.812A STRAIN OF LEFT TRAPEZIUS MUSCLE, INITIAL ENCOUNTER: ICD-10-CM

## 2018-04-17 DIAGNOSIS — S29.019A THORACIC MYOFASCIAL STRAIN, INITIAL ENCOUNTER: Primary | ICD-10-CM

## 2018-04-17 PROCEDURE — 97140 MANUAL THERAPY 1/> REGIONS: CPT | Performed by: PHYSICAL MEDICINE & REHABILITATION

## 2018-04-17 PROCEDURE — 97110 THERAPEUTIC EXERCISES: CPT | Performed by: PHYSICAL MEDICINE & REHABILITATION

## 2018-04-19 ENCOUNTER — OFFICE VISIT (OUTPATIENT)
Dept: PHYSICAL THERAPY | Facility: CLINIC | Age: 38
End: 2018-04-19
Payer: COMMERCIAL

## 2018-04-19 DIAGNOSIS — S16.1XXA STRAIN OF NECK MUSCLE, INITIAL ENCOUNTER: ICD-10-CM

## 2018-04-19 DIAGNOSIS — S46.812A STRAIN OF LEFT TRAPEZIUS MUSCLE, INITIAL ENCOUNTER: ICD-10-CM

## 2018-04-19 DIAGNOSIS — S29.019A THORACIC MYOFASCIAL STRAIN, INITIAL ENCOUNTER: Primary | ICD-10-CM

## 2018-04-19 PROCEDURE — 97110 THERAPEUTIC EXERCISES: CPT | Performed by: PHYSICAL MEDICINE & REHABILITATION

## 2018-04-19 PROCEDURE — 97140 MANUAL THERAPY 1/> REGIONS: CPT | Performed by: PHYSICAL MEDICINE & REHABILITATION

## 2018-04-19 PROCEDURE — 97112 NEUROMUSCULAR REEDUCATION: CPT | Performed by: PHYSICAL MEDICINE & REHABILITATION

## 2018-04-19 NOTE — PROGRESS NOTES
Daily Note     Today's date: 2018  Patient name: Virginia Kuo  : 1980  MRN: [id number]  Referring provider: Donell Gowers, DO  Dx:   Encounter Diagnosis     ICD-10-CM    1  Thoracic myofascial strain, initial encounter S29 019A    2  Strain of left trapezius muscle, initial encounter S46 812A    3  Strain of neck muscle, initial encounter S16  1XXA        Start Time: 730  Stop Time: 827  Total time in clinic (min): 57 minutes    Subjective: Pt states that she experienced some muscular soreness and cramping after last tx, however she felt much better the following day         Objective: See treatment diary below  Precautions: none     Manual  4/19  3/13  3/20  3/27  4/2  4/4  4/10  4/17       C1/C2 lateral press (when pain resides) NC NP            NC  improved SB       T/S P/A (t3/4-t5/6)  NC(Gr V)  NC      NC             Cervical upslides  NP  NC                   STM (UT, paraspinals)  NP  [place]          NC       Scapular distraction NP NC                   IASTM (paraspinals, scap)  NC       NC [place]  NC       Suboccipital inhibition  STM  NP [facility]  [place]       [place]                     Exercise Diary   4/19 3/13  3/20  3/27  4/2  4/4  4/10  4/17       Pulleys  5'  5' 5'  5'  5'  5'  5'  5'       Doorway stretch  3 x 30" NP  20"x5  20"x5     20"x5         Levator stretch*  NP 3x  30"  3x30"  3x30"     3x30"         UT stretch* NP"  3x  30"  3x30"  3x30"      3x30"  3 x 30"       SCM stretch* NP  3x  30"     3x30"  3x30"  3 x 30"  3x30"  3x30"  3 x 30"       Scalene stretch*  NP  3x  30"  3x30"  3x30"      3x30"         Chin tucks c biofeedback 10 x 10" hold(yellow/green) 10x  5"  10x5"  10x5"      10x5"  No Bio  10 x 10" hold (yellow/green)       TB ext NP NP  NP NP      NP         Thoracic ext (chair/towel)  NP  3"x5  3"x5  3"x5      NP/No  Affect          open books  NP  5x5"  5x5"  5x5"  3 x 30"    3x30"          rhomboid stretch  3 x 30"         5x15"  5x15"  5 x 15"        1/2 roll pec stretch  90"                      1/2 roll routine  1 " ea                      1/2 roll scap squeeze  20 x  c 5" hold                                                                                                                                                                           Modalities   3/13  3/20  3/27  4/10  4/17  4/19           CP/MHP (PRN) CPx10'  NP  MHP  DECLIN  10'  10'            TENS      15'  DECLIN                                                Assessment: Tolerated treatment well  Patient demonstated proper technique without compensation with deep neck flexor exercises  Quality and quantity of cervical rotation improved post manual        Plan: Progress treatment as tolerated

## 2018-04-24 ENCOUNTER — OFFICE VISIT (OUTPATIENT)
Dept: PHYSICAL THERAPY | Facility: CLINIC | Age: 38
End: 2018-04-24
Payer: COMMERCIAL

## 2018-04-24 DIAGNOSIS — S46.812A STRAIN OF LEFT TRAPEZIUS MUSCLE, INITIAL ENCOUNTER: ICD-10-CM

## 2018-04-24 DIAGNOSIS — S16.1XXA STRAIN OF NECK MUSCLE, INITIAL ENCOUNTER: ICD-10-CM

## 2018-04-24 DIAGNOSIS — S29.019A THORACIC MYOFASCIAL STRAIN, INITIAL ENCOUNTER: Primary | ICD-10-CM

## 2018-04-24 PROCEDURE — 97150 GROUP THERAPEUTIC PROCEDURES: CPT

## 2018-04-24 PROCEDURE — 97140 MANUAL THERAPY 1/> REGIONS: CPT

## 2018-04-24 PROCEDURE — 97110 THERAPEUTIC EXERCISES: CPT

## 2018-04-24 NOTE — PROGRESS NOTES
Daily Note     Today's date: 2018  Patient name: Ela Hess  : 1980  MRN: [id number]  Referring provider: Mortimer Liming, DO  Dx:   Encounter Diagnosis     ICD-10-CM    1  Thoracic myofascial strain, initial encounter S29 019A    2  Strain of left trapezius muscle, initial encounter S46 812A    3  Strain of neck muscle, initial encounter S16  1XXA                   Subjective: Patient's primary complaint today is a "pressure type feeling on the left side of my neck"  Patient indicates she had 2 headaches since last visit      Objective: See treatment diary below    Manual  4/19  3/13  3/20  3/27  4/2  4/4  4/10  4/17  4/24     C1/C2 lateral press (when pain resides) NC NP            NC  improved SB NC     T/S P/A (t3/4-t5/6)  NC(Gr V)  NC      NC       NC     Cervical upslides  NP  NC                   STM (UT, paraspinals)  NP  [place]          NC NC     Scapular distraction NP NC                   IASTM (paraspinals, scap)  NC       [place]  [place]     Suboccipital inhibition  STM  NP  NC  [place]       [place]                     Exercise Diary   4/19 3/13  3/20  3/27  4/2  4/4  4/10  4/17  4/24     Pulleys  5'  5' 5'  5'  5'  5'  5'  5'  5'     Doorway stretch  3 x 30" NP  20"x5  20"x5     20"x5    NP     Levator stretch*  NP 3x  30"  3x30"  3x30"     3x30"    3x30"     UT stretch* NP"  3x  30"  3x30"  3x30"      3x30"  3 x 30"  3x30"     SCM stretch* NP  3x  30"     3x30"  3x30"  3 x 30"  3x30"  3x30"  3 x 30" 3x30"     Scalene stretch*  NP  3x  30"  3x30"  3x30"      3x30"   3x30"     Chin tucks c biofeedback 10 x 10" hold(yellow/green) 10x  5"  10x5"  10x5"      10x5"  No Bio  10 x 10" hold (yellow/green) NP     TB ext NP NP  NP NP      NP NP NP     Thoracic ext (chair/towel)  NP  3"x5  3"x5  3"x5      NP/No  Affect   NP      open books  NP  5x5"  5x5"  5x5"  3 x 30"    3x30"   NP      rhomboid stretch  3 x 30"         5x15"  5x15"  5 x 15" NP      1/2 roll pec stretch  90"                    1/2 roll routine  1 " ea                      1/2 roll scap squeeze  20 x  c 5" hold                                                                                                                                                                           Modalities   3/13  3/20  3/27  4/10  4/17  4/19  4/24         CP/MHP (PRN) CPx10'  NP  MHP  DECLIN  10'  10'  DECLIN          TENS      15'  DECLIN                                                  Assessment: Tolerated treatment very well and reported good relief at the end of the session  Plan: Continue therapy as tolerated

## 2018-04-26 ENCOUNTER — OFFICE VISIT (OUTPATIENT)
Dept: PHYSICAL THERAPY | Facility: CLINIC | Age: 38
End: 2018-04-26
Payer: COMMERCIAL

## 2018-04-26 DIAGNOSIS — S29.019A THORACIC MYOFASCIAL STRAIN, INITIAL ENCOUNTER: Primary | ICD-10-CM

## 2018-04-26 DIAGNOSIS — S46.812A STRAIN OF LEFT TRAPEZIUS MUSCLE, INITIAL ENCOUNTER: ICD-10-CM

## 2018-04-26 DIAGNOSIS — S16.1XXA STRAIN OF NECK MUSCLE, INITIAL ENCOUNTER: ICD-10-CM

## 2018-04-26 PROCEDURE — 97110 THERAPEUTIC EXERCISES: CPT

## 2018-04-26 NOTE — PROGRESS NOTES
Daily Note     Today's date: 2018  Patient name: Rainer Hartmann  : 1980  MRN: [id number]  Referring provider: Norberto Guadarrama DO  Dx:   Encounter Diagnosis     ICD-10-CM    1  Thoracic myofascial strain, initial encounter S29 019A    2  Strain of left trapezius muscle, initial encounter S46 812A    3  Strain of neck muscle, initial encounter S16  1XXA                    Subjective: Patient reports she was sitting in a meeting all day and her neck on the left side feels really tight        Objective: See treatment diary below      Manual  4/19  3/13  3/20  3/27  4/2  4/4  4/10  4/17  4/24  4/26   C1/C2 lateral press (when pain resides) NC NP            NC  improved SB NC NP   T/S P/A (t3/4-t5/6)  NC(Gr V)  NC      NC       NC NP   Cervical upslides  NP  NC                   STM (UT, paraspinals)  NP  [place]          NC NC  NP   Scapular distraction NP NC                   IASTM (paraspinals, scap)  NC       [place]  [place]   Suboccipital inhibition  STM  NP [facility]  release [place]       [place]                     Exercise Diary   4/19 3/13  3/20  3/27  4/2  4/4  4/10  4/17  4/24 4/26   Pulleys  5'  5' 5'  5'  5'  5'  5'  5'  5'  5'   Doorway stretch  3 x 30" NP  20"x5  20"x5     20"x5    NP  20"x5   Levator stretch*  NP 3x  30"  3x30"  3x30"     3x30"    3x30" 3x30"   UT stretch* NP"  3x  30"  3x30"  3x30"      3x30"  3 x 30"  3x30"  3x30"   SCM stretch* NP  3x  30"     3x30"  3x30"  3 x 30"  3x30"  3x30"  3 x 30" 3x30"  3x30"   Scalene stretch*  NP  3x  30"  3x30"  3x30"      3x30"   3x30"  3x30"   Chin tucks c biofeedback 10 x 10" hold(yellow/green) 10x  5"  10x5"  10x5"      10x5"  No Bio  10 x 10" hold (yellow/green) NP NP   TB ext NP NP  NP NP      NP NP NP NP   Thoracic ext (chair/towel)  NP  3"x5  3"x5  3"x5      NP/No  Affect   NP  DC    open books  NP  5x5"  5x5"  5x5"  3 x 30"    3x30"   NP  NP    rhomboid stretch  3 x 30"         5x15"  5x15"  5 x 15" NP 5x15"    1/2 roll pec stretch  90"                      1/2 roll routine  1 " ea                      1/2 roll scap squeeze  20 x  c 5" hold                                                                                                                                                                           Modalities   3/13  3/20  3/27  4/10  4/17  4/19  4/24  4/26       CP/MHP (PRN) CPx10'  NP  MHP  DECLIN  10'  10'  DECLIN  CP        TENS      15'  DECLIN                                                Assessment: Tolerated treatment well, however held some of the advanced exercises due to having a difficult day  Patient would benefit from continued therapy  Plan: Continue therapy as tolerated per plan of care

## 2018-05-01 ENCOUNTER — OFFICE VISIT (OUTPATIENT)
Dept: PHYSICAL THERAPY | Facility: CLINIC | Age: 38
End: 2018-05-01
Payer: COMMERCIAL

## 2018-05-01 DIAGNOSIS — S29.019A THORACIC MYOFASCIAL STRAIN, INITIAL ENCOUNTER: Primary | ICD-10-CM

## 2018-05-01 DIAGNOSIS — S16.1XXA STRAIN OF NECK MUSCLE, INITIAL ENCOUNTER: ICD-10-CM

## 2018-05-01 DIAGNOSIS — S46.812A STRAIN OF LEFT TRAPEZIUS MUSCLE, INITIAL ENCOUNTER: ICD-10-CM

## 2018-05-01 PROCEDURE — 97110 THERAPEUTIC EXERCISES: CPT

## 2018-05-01 PROCEDURE — 97140 MANUAL THERAPY 1/> REGIONS: CPT

## 2018-05-01 NOTE — PROGRESS NOTES
Daily Note     Today's date: 2018  Patient name: Perri Ashford  : 1980  MRN: [id number]  Referring provider: Opal Godfrey DO  Dx:   Encounter Diagnosis     ICD-10-CM    1  Thoracic myofascial strain, initial encounter S29 019A    2  Strain of left trapezius muscle, initial encounter S46 812A    3  Strain of neck muscle, initial encounter S16  1XXA                   Subjective: Patient reports her neck is good but remains tight behind her shoulder  Otherwise no pain  Objective: See treatment diary below      Assessment: Patient able to add chin tucks back into program as she feels much better this week  No flare ups lately or during TE/stretching  Tolerated treatment well  Patient exhibited good technique with therapeutic exercises      Plan: Continue per plan of care       Manual  4/19  3/13  3/20  3/27  4/2  4/4  4/10  4/17  4/24  4/26 5/1   C1/C2 lateral press (when pain resides) NC NP            NC  improved SB NC NP np   T/S P/A (t3/4-t5/6)  NC(Gr V)  NC      NC       NC NP np   Cervical upslides  NP  NC                    STM (UT, paraspinals)  NP  [place]          NC NC  NP np   Scapular distraction NP NC                    IASTM (paraspinals, scap)  NC       NC [place]  [place]   Suboccipital inhibition  STM  NP [facility]  release [place]       [place]                      Exercise Diary   4/19 3/13  3/20  3/27  4/2  4/4  4/10  4/17  4/24 4/26 5/1   Pulleys  5'  5' 5'  5'  5'  5'  5'  5'  5'  5' 5'   Doorway stretch  3 x 30" NP  20"x5  20"x5     20"x5    NP  20"x5 20"x5   Levator stretch*  NP 3x  30"  3x30"  3x30"     3x30"    3x30" 3x30" 3x30"   UT stretch* NP"  3x  30"  3x30"  3x30"      3x30"  3 x 30"  3x30"  3x30" 3x30"   SCM stretch* NP  3x  30"     3x30"  3x30"  3 x 30"  3x30"  3x30"  3 x 30" 3x30"  3x30" 3x30"   Scalene stretch*  NP  3x  30"  3x30"  3x30"      3x30"   3x30"  3x30" 3x30"   Chin tucks c biofeedback 10 x 10" hold(yellow/green) 10x  5"  10x5"  10x5"      10x5"  No Bio  10 x 10" hold (yellow/green) NP NP 10"x10   TB ext NP NP  NP NP      NP NP NP NP    Thoracic ext (chair/towel)  NP  3"x5  3"x5  3"x5      NP/No  Affect   NP  DC     open books  NP  5x5"  5x5"  5x5"  3 x 30"    3x30"   NP  NP     rhomboid stretch  3 x 30"         5x15"  5x15"  5 x 15" NP 5x15" 5x20"    1/2 roll pec stretch  90"                       1/2 roll routine  1 " ea                       1/2 roll scap squeeze  20 x  c 5" hold                                                                                                                                                                                  Modalities   3/13  3/20  3/27  4/10  4/17  4/19  4/24  4/26  5/1     CP/MHP (PRN) CPx10'  NP  MHP  DECLIN  10'  10'  DECLIN  CP  Decline      TENS      15'  DECLIN

## 2018-05-03 ENCOUNTER — APPOINTMENT (OUTPATIENT)
Dept: PHYSICAL THERAPY | Facility: CLINIC | Age: 38
End: 2018-05-03
Payer: COMMERCIAL

## 2018-05-08 ENCOUNTER — APPOINTMENT (OUTPATIENT)
Dept: PHYSICAL THERAPY | Facility: CLINIC | Age: 38
End: 2018-05-08
Payer: COMMERCIAL

## 2018-05-10 ENCOUNTER — APPOINTMENT (OUTPATIENT)
Dept: PHYSICAL THERAPY | Facility: CLINIC | Age: 38
End: 2018-05-10
Payer: COMMERCIAL

## 2018-05-14 ENCOUNTER — OFFICE VISIT (OUTPATIENT)
Dept: FAMILY MEDICINE CLINIC | Facility: CLINIC | Age: 38
End: 2018-05-14
Payer: COMMERCIAL

## 2018-05-14 VITALS
HEART RATE: 66 BPM | BODY MASS INDEX: 30.89 KG/M2 | OXYGEN SATURATION: 97 % | SYSTOLIC BLOOD PRESSURE: 104 MMHG | WEIGHT: 192.2 LBS | HEIGHT: 66 IN | RESPIRATION RATE: 16 BRPM | DIASTOLIC BLOOD PRESSURE: 66 MMHG

## 2018-05-14 DIAGNOSIS — D18.03 HEMANGIOMA OF LIVER: ICD-10-CM

## 2018-05-14 DIAGNOSIS — M19.90 ARTHRITIS: ICD-10-CM

## 2018-05-14 DIAGNOSIS — F32.A DEPRESSION, UNSPECIFIED DEPRESSION TYPE: Primary | ICD-10-CM

## 2018-05-14 DIAGNOSIS — R06.2 WHEEZING: ICD-10-CM

## 2018-05-14 PROCEDURE — 99213 OFFICE O/P EST LOW 20 MIN: CPT | Performed by: FAMILY MEDICINE

## 2018-05-14 RX ORDER — ESCITALOPRAM OXALATE 10 MG/1
10 TABLET ORAL DAILY
Qty: 30 TABLET | Refills: 5 | Status: SHIPPED | OUTPATIENT
Start: 2018-05-14 | End: 2018-06-07 | Stop reason: SDUPTHER

## 2018-05-14 RX ORDER — ALBUTEROL SULFATE 90 UG/1
2 AEROSOL, METERED RESPIRATORY (INHALATION) EVERY 6 HOURS PRN
Qty: 1 INHALER | Refills: 3 | Status: SHIPPED | OUTPATIENT
Start: 2018-05-14 | End: 2019-01-03 | Stop reason: SDUPTHER

## 2018-05-14 RX ORDER — ALBUTEROL SULFATE 90 UG/1
2 AEROSOL, METERED RESPIRATORY (INHALATION) EVERY 6 HOURS PRN
COMMUNITY
End: 2018-05-14 | Stop reason: SDUPTHER

## 2018-05-14 RX ORDER — CELECOXIB 200 MG/1
200 CAPSULE ORAL DAILY
Qty: 30 CAPSULE | Refills: 2 | Status: SHIPPED | OUTPATIENT
Start: 2018-05-14 | End: 2018-06-07 | Stop reason: CLARIF

## 2018-05-14 NOTE — PROGRESS NOTES
Assessment/Plan:         Diagnoses and all orders for this visit:    Wheezing  -     albuterol (PROVENTIL HFA,VENTOLIN HFA) 90 mcg/act inhaler; Inhale 2 puffs every 6 (six) hours as needed for wheezing    Hemangioma of liver    Depression, unspecified depression type    Arthritis    Other orders  -     albuterol (PROVENTIL HFA,VENTOLIN HFA) 90 mcg/act inhaler; Inhale 2 puffs every 6 (six) hours as needed for wheezing          Subjective:      Patient ID: Nkechi Fleming is a 42 y o  female  Patient presents with:  Depression: Patient presents to the office today for c/o depression symptoms for the past 6 years  Also here today to discuss arthritis pain in her hands that has started about 4 months ago  Patient also had a CT scan of her abdonin that showed a posible Liver Hemangioma that she would like to follow up for  The Report for this is in the CareEverywhere tab in the chart  Medication Refill: Albuterol Inhaler            The following portions of the patient's history were reviewed and updated as appropriate: allergies, current medications, past family history, past medical history, past social history, past surgical history and problem list     Review of Systems   Constitutional: Negative  HENT: Negative  Eyes: Negative  Respiratory: Negative  Cardiovascular: Negative  Gastrointestinal: Negative  Endocrine: Negative  Genitourinary: Negative  Musculoskeletal: Positive for arthralgias  Skin: Negative  Allergic/Immunologic: Negative  Neurological: Negative  Hematological: Negative  Psychiatric/Behavioral: Positive for dysphoric mood  All other systems reviewed and are negative          Objective:      /66 (BP Location: Left arm, Patient Position: Sitting, Cuff Size: Large)   Pulse 66   Resp 16   Ht 5' 6" (1 676 m)   Wt 87 2 kg (192 lb 3 2 oz)   LMP 05/03/2018   SpO2 97%   BMI 31 02 kg/m²          Physical Exam   Constitutional: She is oriented to person, place, and time  She appears well-developed and well-nourished  HENT:   Head: Normocephalic and atraumatic  Right Ear: External ear normal    Left Ear: External ear normal    Nose: Nose normal    Mouth/Throat: Oropharynx is clear and moist    Eyes: Conjunctivae and EOM are normal  Pupils are equal, round, and reactive to light  Neck: Normal range of motion  Neck supple  Cardiovascular: Normal rate, regular rhythm and normal heart sounds  Pulmonary/Chest: Effort normal and breath sounds normal    Abdominal: Soft  Bowel sounds are normal    Musculoskeletal: Normal range of motion  Neurological: She is alert and oriented to person, place, and time  She has normal reflexes  Skin: Skin is warm and dry  Psychiatric: She has a normal mood and affect  Her behavior is normal    Nursing note and vitals reviewed

## 2018-05-15 ENCOUNTER — OFFICE VISIT (OUTPATIENT)
Dept: PHYSICAL THERAPY | Facility: CLINIC | Age: 38
End: 2018-05-15
Payer: COMMERCIAL

## 2018-05-15 DIAGNOSIS — S16.1XXA STRAIN OF NECK MUSCLE, INITIAL ENCOUNTER: ICD-10-CM

## 2018-05-15 DIAGNOSIS — S29.019A THORACIC MYOFASCIAL STRAIN, INITIAL ENCOUNTER: Primary | ICD-10-CM

## 2018-05-15 DIAGNOSIS — S46.812A STRAIN OF LEFT TRAPEZIUS MUSCLE, INITIAL ENCOUNTER: ICD-10-CM

## 2018-05-15 PROCEDURE — 97112 NEUROMUSCULAR REEDUCATION: CPT | Performed by: PHYSICAL MEDICINE & REHABILITATION

## 2018-05-15 PROCEDURE — G8986 CARRY D/C STATUS: HCPCS | Performed by: PHYSICAL MEDICINE & REHABILITATION

## 2018-05-15 PROCEDURE — 97140 MANUAL THERAPY 1/> REGIONS: CPT | Performed by: PHYSICAL MEDICINE & REHABILITATION

## 2018-05-15 PROCEDURE — G8985 CARRY GOAL STATUS: HCPCS | Performed by: PHYSICAL MEDICINE & REHABILITATION

## 2018-05-15 PROCEDURE — 97110 THERAPEUTIC EXERCISES: CPT | Performed by: PHYSICAL MEDICINE & REHABILITATION
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
## 2018-05-17 ENCOUNTER — APPOINTMENT (OUTPATIENT)
Dept: PHYSICAL THERAPY | Facility: CLINIC | Age: 38
End: 2018-05-17
Payer: COMMERCIAL

## 2018-05-22 ENCOUNTER — APPOINTMENT (OUTPATIENT)
Dept: PHYSICAL THERAPY | Facility: CLINIC | Age: 38
End: 2018-05-22
Payer: COMMERCIAL

## 2018-05-25 ENCOUNTER — APPOINTMENT (OUTPATIENT)
Dept: PHYSICAL THERAPY | Facility: CLINIC | Age: 38
End: 2018-05-25
Payer: COMMERCIAL

## 2018-06-02 ENCOUNTER — APPOINTMENT (OUTPATIENT)
Dept: LAB | Facility: MEDICAL CENTER | Age: 38
End: 2018-06-02
Payer: COMMERCIAL

## 2018-06-02 DIAGNOSIS — F32.A VASCULAR DEMENTIA WITH DEPRESSED MOOD (HCC): Primary | ICD-10-CM

## 2018-06-02 DIAGNOSIS — F01.50 VASCULAR DEMENTIA WITH DEPRESSED MOOD (HCC): Primary | ICD-10-CM

## 2018-06-02 LAB
ALBUMIN SERPL BCP-MCNC: 3.7 G/DL (ref 3.5–5)
ALP SERPL-CCNC: 51 U/L (ref 46–116)
ALT SERPL W P-5'-P-CCNC: 20 U/L (ref 12–78)
ANION GAP SERPL CALCULATED.3IONS-SCNC: 6 MMOL/L (ref 4–13)
AST SERPL W P-5'-P-CCNC: 13 U/L (ref 5–45)
BILIRUB SERPL-MCNC: 0.45 MG/DL (ref 0.2–1)
BUN SERPL-MCNC: 9 MG/DL (ref 5–25)
CALCIUM SERPL-MCNC: 8.7 MG/DL (ref 8.3–10.1)
CHLORIDE SERPL-SCNC: 107 MMOL/L (ref 100–108)
CHOLEST SERPL-MCNC: 130 MG/DL (ref 50–200)
CO2 SERPL-SCNC: 27 MMOL/L (ref 21–32)
CREAT SERPL-MCNC: 0.82 MG/DL (ref 0.6–1.3)
ERYTHROCYTE [DISTWIDTH] IN BLOOD BY AUTOMATED COUNT: 12.6 % (ref 11.6–15.1)
GFR SERPL CREATININE-BSD FRML MDRD: 90 ML/MIN/1.73SQ M
GLUCOSE P FAST SERPL-MCNC: 91 MG/DL (ref 65–99)
HCT VFR BLD AUTO: 36.3 % (ref 34.8–46.1)
HDLC SERPL-MCNC: 44 MG/DL (ref 40–60)
HGB BLD-MCNC: 11.6 G/DL (ref 11.5–15.4)
LDLC SERPL CALC-MCNC: 70 MG/DL (ref 0–100)
MCH RBC QN AUTO: 30.4 PG (ref 26.8–34.3)
MCHC RBC AUTO-ENTMCNC: 32 G/DL (ref 31.4–37.4)
MCV RBC AUTO: 95 FL (ref 82–98)
NONHDLC SERPL-MCNC: 86 MG/DL
PLATELET # BLD AUTO: 250 THOUSANDS/UL (ref 149–390)
PMV BLD AUTO: 11.5 FL (ref 8.9–12.7)
POTASSIUM SERPL-SCNC: 4.5 MMOL/L (ref 3.5–5.3)
PROT SERPL-MCNC: 7.2 G/DL (ref 6.4–8.2)
RBC # BLD AUTO: 3.81 MILLION/UL (ref 3.81–5.12)
SODIUM SERPL-SCNC: 140 MMOL/L (ref 136–145)
TRIGL SERPL-MCNC: 81 MG/DL
TSH SERPL DL<=0.05 MIU/L-ACNC: 0.72 UIU/ML (ref 0.36–3.74)
WBC # BLD AUTO: 6.15 THOUSAND/UL (ref 4.31–10.16)

## 2018-06-02 PROCEDURE — 80053 COMPREHEN METABOLIC PANEL: CPT | Performed by: FAMILY MEDICINE

## 2018-06-02 PROCEDURE — 86038 ANTINUCLEAR ANTIBODIES: CPT

## 2018-06-02 PROCEDURE — 84443 ASSAY THYROID STIM HORMONE: CPT | Performed by: FAMILY MEDICINE

## 2018-06-02 PROCEDURE — 85027 COMPLETE CBC AUTOMATED: CPT | Performed by: FAMILY MEDICINE

## 2018-06-02 PROCEDURE — 36415 COLL VENOUS BLD VENIPUNCTURE: CPT | Performed by: FAMILY MEDICINE

## 2018-06-02 PROCEDURE — 80061 LIPID PANEL: CPT | Performed by: FAMILY MEDICINE

## 2018-06-02 PROCEDURE — 86430 RHEUMATOID FACTOR TEST QUAL: CPT | Performed by: FAMILY MEDICINE

## 2018-06-04 LAB
RHEUMATOID FACT SER QL LA: NEGATIVE
RYE IGE QN: NEGATIVE

## 2018-06-07 ENCOUNTER — OFFICE VISIT (OUTPATIENT)
Dept: FAMILY MEDICINE CLINIC | Facility: CLINIC | Age: 38
End: 2018-06-07
Payer: COMMERCIAL

## 2018-06-07 VITALS
TEMPERATURE: 97.5 F | HEIGHT: 66 IN | BODY MASS INDEX: 30.86 KG/M2 | DIASTOLIC BLOOD PRESSURE: 70 MMHG | SYSTOLIC BLOOD PRESSURE: 110 MMHG | WEIGHT: 192 LBS

## 2018-06-07 DIAGNOSIS — F32.A DEPRESSION, UNSPECIFIED DEPRESSION TYPE: ICD-10-CM

## 2018-06-07 PROCEDURE — 99213 OFFICE O/P EST LOW 20 MIN: CPT | Performed by: FAMILY MEDICINE

## 2018-06-07 RX ORDER — ESCITALOPRAM OXALATE 5 MG/1
5 TABLET ORAL DAILY
Qty: 30 TABLET | Refills: 5 | Status: SHIPPED | OUTPATIENT
Start: 2018-06-07 | End: 2018-11-26 | Stop reason: SDUPTHER

## 2018-06-07 NOTE — PROGRESS NOTES
Assessment/Plan:    We discussed therapy  She does like that her depression is better with the Lexapro however she is suffering side effects  An discussion about possible course of action  Decided to lower the dose of Lexapro see if she does better with it if she still has the side effects will consider changing classes  Follow-up here in 1 month or as needed  Diagnoses and all orders for this visit:    Depression, unspecified depression type  -     escitalopram (LEXAPRO) 5 mg tablet; Take 1 tablet (5 mg total) by mouth daily for 180 days          Subjective:      Patient ID: Denis Haro is a 34 y o  female  Patient presents with: Follow-up: on blood work results and med check  Patient states the antidepressants are working well but she is having side effects of nausea, insomnia, decreased libedo, a hard time urinating, and yawning  The following portions of the patient's history were reviewed and updated as appropriate: allergies, current medications, past family history, past medical history, past social history, past surgical history and problem list     Review of Systems   Constitutional: Negative  HENT: Negative  Respiratory: Negative  Cardiovascular: Negative  Gastrointestinal: Positive for nausea  Genitourinary: Negative  Objective:      /70 (BP Location: Right arm, Patient Position: Sitting, Cuff Size: Standard)   Temp 97 5 °F (36 4 °C)   Ht 5' 6" (1 676 m)   Wt 87 1 kg (192 lb)   LMP 06/05/2018   BMI 30 99 kg/m²          Physical Exam   Constitutional: She is oriented to person, place, and time  She appears well-developed and well-nourished  HENT:   Head: Normocephalic and atraumatic  Right Ear: External ear normal    Left Ear: External ear normal    Nose: Nose normal    Mouth/Throat: Oropharynx is clear and moist    Eyes: Conjunctivae and EOM are normal  Pupils are equal, round, and reactive to light  Neck: Normal range of motion   Neck supple  Cardiovascular: Normal rate, regular rhythm and normal heart sounds  Pulmonary/Chest: Effort normal and breath sounds normal    Abdominal: Soft  Bowel sounds are normal    Musculoskeletal: Normal range of motion  Neurological: She is alert and oriented to person, place, and time  She has normal reflexes  Skin: Skin is warm and dry  Psychiatric: She has a normal mood and affect  Her behavior is normal    Nursing note and vitals reviewed

## 2018-07-16 ENCOUNTER — OFFICE VISIT (OUTPATIENT)
Dept: FAMILY MEDICINE CLINIC | Facility: CLINIC | Age: 38
End: 2018-07-16
Payer: COMMERCIAL

## 2018-07-16 VITALS
SYSTOLIC BLOOD PRESSURE: 108 MMHG | BODY MASS INDEX: 30.7 KG/M2 | DIASTOLIC BLOOD PRESSURE: 74 MMHG | HEIGHT: 66 IN | WEIGHT: 191 LBS | TEMPERATURE: 98.7 F

## 2018-07-16 DIAGNOSIS — F32.A DEPRESSION, UNSPECIFIED DEPRESSION TYPE: Primary | ICD-10-CM

## 2018-07-16 DIAGNOSIS — M19.90 ARTHRITIS: ICD-10-CM

## 2018-07-16 PROCEDURE — 99213 OFFICE O/P EST LOW 20 MIN: CPT | Performed by: FAMILY MEDICINE

## 2018-07-16 PROCEDURE — 3008F BODY MASS INDEX DOCD: CPT | Performed by: FAMILY MEDICINE

## 2018-07-16 NOTE — PROGRESS NOTES
Assessment/Plan:      Continue current therapy  Follow-up in 5 months  Diagnoses and all orders for this visit:    Depression, unspecified depression type    Arthritis          Subjective:      Patient ID: Lorin Hernandez is a 34 y o  female  Patient presents with: Follow-up: 1 month    She is feeling well  She said all the side effects from the Lexapro have subsided except she has trouble getting urination stream started at night  The following portions of the patient's history were reviewed and updated as appropriate: allergies, current medications, past family history, past medical history, past social history, past surgical history and problem list     Review of Systems   Constitutional: Negative  HENT: Negative  Eyes: Negative  Respiratory: Negative  Cardiovascular: Negative  Gastrointestinal: Negative  Endocrine: Negative  Genitourinary: Negative  Musculoskeletal: Negative  Skin: Negative  Allergic/Immunologic: Negative  Neurological: Negative  Hematological: Negative  Psychiatric/Behavioral: Negative  All other systems reviewed and are negative  Objective:      /74 (BP Location: Right arm, Patient Position: Sitting, Cuff Size: Standard)   Temp 98 7 °F (37 1 °C) (Tympanic)   Ht 5' 6" (1 676 m)   Wt 86 6 kg (191 lb)   LMP 06/12/2018 (Approximate)   Breastfeeding? No   BMI 30 83 kg/m²          Physical Exam   Constitutional: She is oriented to person, place, and time  She appears well-developed and well-nourished  HENT:   Head: Normocephalic and atraumatic  Right Ear: External ear normal    Left Ear: External ear normal    Nose: Nose normal    Mouth/Throat: Oropharynx is clear and moist    Eyes: Conjunctivae and EOM are normal  Pupils are equal, round, and reactive to light  Neck: Normal range of motion  Neck supple  Cardiovascular: Normal rate, regular rhythm and normal heart sounds      Pulmonary/Chest: Effort normal and breath sounds normal    Abdominal: Soft  Bowel sounds are normal    Musculoskeletal: Normal range of motion  Neurological: She is alert and oriented to person, place, and time  She has normal reflexes  Skin: Skin is warm and dry  Psychiatric: She has a normal mood and affect  Her behavior is normal    Nursing note and vitals reviewed

## 2018-11-26 DIAGNOSIS — F32.A DEPRESSION, UNSPECIFIED DEPRESSION TYPE: ICD-10-CM

## 2018-11-26 RX ORDER — ESCITALOPRAM OXALATE 5 MG/1
5 TABLET ORAL DAILY
Qty: 30 TABLET | Refills: 0 | Status: SHIPPED | OUTPATIENT
Start: 2018-11-26 | End: 2019-01-02 | Stop reason: SDUPTHER

## 2018-12-01 ENCOUNTER — HOSPITAL ENCOUNTER (OUTPATIENT)
Dept: MAMMOGRAPHY | Facility: MEDICAL CENTER | Age: 38
Discharge: HOME | End: 2018-12-01
Payer: COMMERCIAL

## 2018-12-01 VITALS — HEIGHT: 66 IN | WEIGHT: 191 LBS | BODY MASS INDEX: 30.7 KG/M2

## 2018-12-01 DIAGNOSIS — Z12.31 ENCOUNTER FOR SCREENING MAMMOGRAM FOR MALIGNANT NEOPLASM OF BREAST: ICD-10-CM

## 2018-12-01 PROCEDURE — 77063 BREAST TOMOSYNTHESIS BI: CPT

## 2018-12-01 PROCEDURE — 77067 SCR MAMMO BI INCL CAD: CPT

## 2019-01-02 DIAGNOSIS — F32.A DEPRESSION, UNSPECIFIED DEPRESSION TYPE: ICD-10-CM

## 2019-01-02 RX ORDER — ESCITALOPRAM OXALATE 5 MG/1
TABLET ORAL
Qty: 30 TABLET | Refills: 0 | Status: SHIPPED | OUTPATIENT
Start: 2019-01-02 | End: 2019-01-03 | Stop reason: SDUPTHER

## 2019-01-03 ENCOUNTER — OFFICE VISIT (OUTPATIENT)
Dept: FAMILY MEDICINE CLINIC | Facility: CLINIC | Age: 39
End: 2019-01-03
Payer: COMMERCIAL

## 2019-01-03 VITALS
DIASTOLIC BLOOD PRESSURE: 70 MMHG | SYSTOLIC BLOOD PRESSURE: 102 MMHG | WEIGHT: 198.8 LBS | HEIGHT: 66 IN | BODY MASS INDEX: 31.95 KG/M2

## 2019-01-03 DIAGNOSIS — S46.812D STRAIN OF LEFT TRAPEZIUS MUSCLE, SUBSEQUENT ENCOUNTER: Primary | ICD-10-CM

## 2019-01-03 DIAGNOSIS — S29.019D THORACIC MYOFASCIAL STRAIN, SUBSEQUENT ENCOUNTER: ICD-10-CM

## 2019-01-03 DIAGNOSIS — S16.1XXD STRAIN OF NECK MUSCLE, SUBSEQUENT ENCOUNTER: ICD-10-CM

## 2019-01-03 DIAGNOSIS — F32.A DEPRESSION, UNSPECIFIED DEPRESSION TYPE: ICD-10-CM

## 2019-01-03 PROCEDURE — 3008F BODY MASS INDEX DOCD: CPT | Performed by: FAMILY MEDICINE

## 2019-01-03 PROCEDURE — 99214 OFFICE O/P EST MOD 30 MIN: CPT | Performed by: FAMILY MEDICINE

## 2019-01-03 PROCEDURE — 98926 OSTEOPATH MANJ 3-4 REGIONS: CPT | Performed by: FAMILY MEDICINE

## 2019-01-03 RX ORDER — ALBUTEROL SULFATE 90 UG/1
2 AEROSOL, METERED RESPIRATORY (INHALATION) EVERY 6 HOURS PRN
COMMUNITY
End: 2019-12-03 | Stop reason: SDUPTHER

## 2019-01-03 RX ORDER — ESCITALOPRAM OXALATE 5 MG/1
5 TABLET ORAL DAILY
Qty: 90 TABLET | Refills: 1 | Status: SHIPPED | OUTPATIENT
Start: 2019-01-03 | End: 2019-01-25 | Stop reason: SDUPTHER

## 2019-01-03 NOTE — PROGRESS NOTES
OMT  Performed by: Louis Horan  Authorized by: Louis Horan     Verbal consent obtained?: Yes    Consent given by:  Patient  Procedure Details:     Region evaluated and treated:  Cervical, Thoracic T1 - T4 and Thoracic T5 - T9    Cervical Details:     Examination Method:  Tissue Texture Change, Stability, Laxity, Effusions, Tone, Range of Motion, Contracture and Tenderness, Pain    Severity:  Moderate    Osteopathic Findings:    C4 is rotated left on C5  There is point tenderness in that area on the right  Treatment Method:  Counterstrain Treatment, Direct Treatment and Muscle Energy Treatment    Response:  Improved - The somatic dysfunction is improved but not completely resolved  Thoracic T1 - T4 details:     Examination Method:  Range of Motion, Contracture, Active and Passive    Severity:  Moderate    Treatment Method:  Direct Treatment, Indirect Treatment and Myofascial Release Treatment    Thoracic T5 - T9 details:     Examination Method:  Range of Motion, Contracture and Passive    Severity:  Moderate    Treatment Method:  Counterstrain Treatment, Indirect Treatment, Muscle Energy Treatment and Direct Treatment    Total Regions Treated:  3            Did soft tissue techniques the cervical and thoracic spine  I did muscle energy techniques to both regions  I did active corrections the cervical and thoracic spine    Answers for HPI/ROS submitted by the patient on 12/27/2018   Back pain  Chronicity: chronic  Onset: more than 1 month ago  Frequency: constantly  Progression since onset: gradually worsening  Pain location: thoracic spine  Pain quality: aching  Radiates to: does not radiate  Pain - numeric: 6/10  Pain is: the same all the time  Stiffness is present: all day  abdominal pain: No  bladder incontinence: No  bowel incontinence: No  chest pain: No  dysuria: No  fever: No  headaches: Yes  leg pain: No  numbness: No  paresis: No  paresthesias: No  pelvic pain: No  perianal numbness: No  tingling: No  weakness: No  weight loss:  No

## 2019-01-03 NOTE — PROGRESS NOTES
Assessment/Plan:         Diagnoses and all orders for this visit:    Strain of left trapezius muscle, subsequent encounter  -     OMT    Depression, unspecified depression type  -     escitalopram (LEXAPRO) 5 mg tablet; Take 1 tablet (5 mg total) by mouth daily for 180 days    Thoracic myofascial strain, subsequent encounter  -     OMT    Strain of neck muscle, subsequent encounter  -     OMT    Other orders  -     albuterol (VENTOLIN HFA) 90 mcg/act inhaler; Inhale 2 puffs every 6 (six) hours as needed for wheezing          Subjective:      Patient ID: Misha Landa is a 34 y o  female  Patient presents with:  Medication Refill: Escitalopram to [facility] - 30 day supply, awaiting approval  No other questions or concerns  Back adjustment: requesting adjustment  Upper back and neck are tight  Back Pain   This is a chronic problem  The current episode started more than 1 month ago  The problem occurs constantly  The problem has been gradually worsening since onset  The pain is present in the thoracic spine  The quality of the pain is described as aching  The pain does not radiate  The pain is at a severity of 6/10  The pain is the same all the time  Stiffness is present all day  Associated symptoms include headaches  Pertinent negatives include no abdominal pain, bladder incontinence, bowel incontinence, chest pain, dysuria, fever, leg pain, numbness, paresis, paresthesias, pelvic pain, perianal numbness, tingling, weakness or weight loss  The following portions of the patient's history were reviewed and updated as appropriate: allergies, current medications, past family history, past medical history, past social history, past surgical history and problem list     Review of Systems   Constitutional: Negative  Negative for fever and weight loss  HENT: Negative  Eyes: Negative  Respiratory: Negative  Cardiovascular: Negative  Negative for chest pain     Gastrointestinal: Negative  Negative for abdominal pain and bowel incontinence  Endocrine: Negative  Genitourinary: Negative  Negative for bladder incontinence, dysuria and pelvic pain  Musculoskeletal: Positive for back pain  Complains of neck and upper back pain  She said there is a spot in her mid back that feels like her ribs is poking through  She has a lot of stiffness in her neck  Skin: Negative  Allergic/Immunologic: Negative  Neurological: Positive for headaches  Negative for tingling, weakness, numbness and paresthesias  Hematological: Negative  Psychiatric/Behavioral: Negative  All other systems reviewed and are negative  Objective:      /70 (BP Location: Left arm, Patient Position: Sitting, Cuff Size: Large)   Ht 5' 6" (1 676 m)   Wt 90 2 kg (198 lb 12 8 oz)   BMI 32 09 kg/m²          Physical Exam   Constitutional: She is oriented to person, place, and time  She appears well-developed and well-nourished  HENT:   Head: Normocephalic and atraumatic  Right Ear: External ear normal    Left Ear: External ear normal    Nose: Nose normal    Mouth/Throat: Oropharynx is clear and moist    Eyes: Pupils are equal, round, and reactive to light  Conjunctivae and EOM are normal    Neck: Normal range of motion  Neck supple  Cardiovascular: Normal rate, regular rhythm and normal heart sounds  Pulmonary/Chest: Effort normal and breath sounds normal    Abdominal: Soft  Bowel sounds are normal    Musculoskeletal: She exhibits tenderness  Paravertebral spasm of the cervical thoracic spine decreased range of motion  Neurological: She is alert and oriented to person, place, and time  She has normal reflexes  Skin: Skin is warm and dry  Psychiatric: She has a normal mood and affect  Her behavior is normal    Nursing note and vitals reviewed

## 2019-01-25 DIAGNOSIS — F32.A DEPRESSION, UNSPECIFIED DEPRESSION TYPE: ICD-10-CM

## 2019-01-25 RX ORDER — ESCITALOPRAM OXALATE 5 MG/1
5 TABLET ORAL DAILY
Qty: 30 TABLET | Refills: 2 | Status: SHIPPED | OUTPATIENT
Start: 2019-01-25 | End: 2019-08-07 | Stop reason: SDUPTHER

## 2019-01-25 NOTE — TELEPHONE ENCOUNTER
Patient called today and stated that her pharmacy of choice does not accept 90 RX on  Lexapro  Medication as to be written 30 days at a time  Previous RX was cancelled

## 2019-03-13 ENCOUNTER — ANNUAL EXAM (OUTPATIENT)
Dept: OBGYN CLINIC | Facility: CLINIC | Age: 39
End: 2019-03-13
Payer: COMMERCIAL

## 2019-03-13 VITALS
DIASTOLIC BLOOD PRESSURE: 62 MMHG | SYSTOLIC BLOOD PRESSURE: 110 MMHG | BODY MASS INDEX: 32.3 KG/M2 | HEIGHT: 66 IN | WEIGHT: 201 LBS

## 2019-03-13 DIAGNOSIS — Z01.419 ENCNTR FOR GYN EXAM (GENERAL) (ROUTINE) W/O ABN FINDINGS: Primary | ICD-10-CM

## 2019-03-13 DIAGNOSIS — Z11.51 SCREENING FOR HUMAN PAPILLOMAVIRUS (HPV): ICD-10-CM

## 2019-03-13 DIAGNOSIS — Z12.31 ENCOUNTER FOR SCREENING MAMMOGRAM FOR MALIGNANT NEOPLASM OF BREAST: ICD-10-CM

## 2019-03-13 DIAGNOSIS — R92.2 DENSE BREAST TISSUE: ICD-10-CM

## 2019-03-13 PROCEDURE — G0145 SCR C/V CYTO,THINLAYER,RESCR: HCPCS | Performed by: PHYSICIAN ASSISTANT

## 2019-03-13 PROCEDURE — S0612 ANNUAL GYNECOLOGICAL EXAMINA: HCPCS | Performed by: PHYSICIAN ASSISTANT

## 2019-03-13 PROCEDURE — 87624 HPV HI-RISK TYP POOLED RSLT: CPT | Performed by: PHYSICIAN ASSISTANT

## 2019-03-13 RX ORDER — PREDNISONE 20 MG/1
TABLET ORAL
Refills: 0 | COMMUNITY
Start: 2019-03-11 | End: 2019-09-30 | Stop reason: ALTCHOICE

## 2019-03-13 NOTE — PROGRESS NOTES
[facility]  1980      CC:  Yearly exam    S:  34 y o  female here for yearly exam  Her cycles are regular, not heavy or crampy  She is sexually active  She uses tubal ligation for contraception  Her daughter is now 4  Last Pap 10/24/14 neg/neg  Last Mammo 12/1/18 neg    Current Outpatient Medications:     albuterol (VENTOLIN HFA) 90 mcg/act inhaler, Inhale 2 puffs every 6 (six) hours as needed for wheezing, Disp: , Rfl:     escitalopram (LEXAPRO) 5 mg tablet, Take 1 tablet (5 mg total) by mouth daily for 180 days, Disp: 30 tablet, Rfl: 2    predniSONE 20 mg tablet, TAKE 2 TABLETS BY MOUTH EVERY DAY FOR 3 DAYS, THEN TAKE 1 TABLET      (REFER TO PRESCRIPTION NOTES)  , Disp: , Rfl: 0  Social History     Socioeconomic History    Marital status: /Civil Union     Spouse name: Not on file    Number of children: Not on file    Years of education: Not on file    Highest education level: Not on file   Occupational History    Not on file   Social Needs    Financial resource strain: Not on file    Food insecurity:     Worry: Not on file     Inability: Not on file    Transportation needs:     Medical: Not on file     Non-medical: Not on file   Tobacco Use    Smoking status: Former Smoker    Smokeless tobacco: Never Used    Tobacco comment: former smoker (as per allscripts)   Substance and Sexual Activity    Alcohol use: No    Drug use: No    Sexual activity: Yes     Partners: Male     Birth control/protection: Female Sterilization   Lifestyle    Physical activity:     Days per week: Not on file     Minutes per session: Not on file    Stress: Not on file   Relationships    Social connections:     Talks on phone: Not on file     Gets together: Not on file     Attends [employer] service: Not on file     Active member of club or organization: Not on file     Attends meetings of clubs or organizations: Not on file     Relationship status: Not on file    Intimate partner violence:     Fear of current or ex partner: Not on file     Emotionally abused: Not on file     Physically abused: Not on file     Forced sexual activity: Not on file   Other Topics Concern    Not on file   Social History Narrative    Always uses seat belt    Denied: history of daily caffeinated coffee consumption    Exercises strenuously less than 3 times a week     Family History   Problem Relation Age of Onset    Breast cancer Mother 37    Cancer Father     Breast cancer Paternal Grandmother     Breast cancer Paternal Aunt 46    Cancer Maternal Grandfather       Past Medical History:   Diagnosis Date    Allergic rhinitis     Asthma     Complex ovarian cyst     last assessed: 7/9/2014    Condyloma acuminatum     Depression     Dysmenorrhea     last assessed: 9/20/2013    H/O human papillomavirus infection     Migraine     Palpitations     last assessed: 12/20/2013        O:  Blood pressure 110/62, height 5' 6" (1 676 m), weight 91 2 kg (201 lb), last menstrual period 02/15/2019, not currently breastfeeding  Patient appears well and is not in distress  Neck is supple without masses  Breasts are symmetrical without mass, tenderness, nipple discharge, skin changes or adenopathy  Abdomen is soft and nontender without masses  External genitals are normal without lesions or rashes  Vagina is normal without discharge or bleeding  Cervix is normal without discharge or lesion  Uterus is normal, mobile, nontender without palpable mass  Adnexa are normal, nontender, without palpable mass  A:  Yearly exam      P:   Pap and HPV today    Mammo slip provided    RTO one year for yearly exam or sooner as needed

## 2019-03-15 LAB
HPV HR 12 DNA CVX QL NAA+PROBE: NEGATIVE
HPV16 DNA CVX QL NAA+PROBE: NEGATIVE
HPV18 DNA CVX QL NAA+PROBE: NEGATIVE

## 2019-03-18 LAB
LAB AP GYN PRIMARY INTERPRETATION: NORMAL
Lab: NORMAL
PATH INTERP SPEC-IMP: NORMAL

## 2019-07-09 DIAGNOSIS — R06.2 WHEEZING: ICD-10-CM

## 2019-08-07 DIAGNOSIS — F32.A DEPRESSION, UNSPECIFIED DEPRESSION TYPE: ICD-10-CM

## 2019-08-07 RX ORDER — ESCITALOPRAM OXALATE 5 MG/1
5 TABLET ORAL DAILY
Qty: 30 TABLET | Refills: 2 | Status: SHIPPED | OUTPATIENT
Start: 2019-08-07 | End: 2019-11-06 | Stop reason: SDUPTHER

## 2019-09-30 ENCOUNTER — OFFICE VISIT (OUTPATIENT)
Dept: FAMILY MEDICINE CLINIC | Facility: CLINIC | Age: 39
End: 2019-09-30
Payer: COMMERCIAL

## 2019-09-30 VITALS
DIASTOLIC BLOOD PRESSURE: 68 MMHG | TEMPERATURE: 97.3 F | HEIGHT: 66 IN | SYSTOLIC BLOOD PRESSURE: 108 MMHG | HEART RATE: 60 BPM | WEIGHT: 187 LBS | BODY MASS INDEX: 30.05 KG/M2

## 2019-09-30 DIAGNOSIS — I95.1 ORTHOSTATIC HYPOTENSION: ICD-10-CM

## 2019-09-30 DIAGNOSIS — Z00.00 ANNUAL PHYSICAL EXAM: Primary | ICD-10-CM

## 2019-09-30 PROCEDURE — 99396 PREV VISIT EST AGE 40-64: CPT | Performed by: FAMILY MEDICINE

## 2019-09-30 PROCEDURE — 99214 OFFICE O/P EST MOD 30 MIN: CPT | Performed by: FAMILY MEDICINE

## 2019-09-30 PROCEDURE — 3008F BODY MASS INDEX DOCD: CPT | Performed by: FAMILY MEDICINE

## 2019-09-30 NOTE — PATIENT INSTRUCTIONS
Wellness Visit for Adults   AMBULATORY CARE:   A wellness visit  is when you see your healthcare provider to get screened for health problems  You can also get advice on how to stay healthy  Write down your questions so you remember to ask them  Ask your healthcare provider how often you should have a wellness visit  What happens at a wellness visit:  Your healthcare provider will ask about your health, and your family history of health problems  This includes high blood pressure, heart disease, and cancer  He or she will ask if you have symptoms that concern you, if you smoke, and about your mood  You may also be asked about your intake of medicines, supplements, food, and alcohol  Any of the following may be done:  · Your weight  will be checked  Your height may also be checked so your body mass index (BMI) can be calculated  Your BMI shows if you are at a healthy weight  · Your blood pressure  and heart rate will be checked  Your temperature may also be checked  · Blood and urine tests  may be done  Blood tests may be done to check your cholesterol levels  Abnormal cholesterol levels increase your risk for heart disease and stroke  You may also need a blood or urine test to check for diabetes if you are at increased risk  Urine tests may be done to look for signs of an infection or kidney disease  · A physical exam  includes checking your heartbeat and lungs with a stethoscope  Your healthcare provider may also check your skin to look for sun damage  · Screening tests  may be recommended  A screening test is done to check for diseases that may not cause symptoms  The screening tests you may need depend on your age, gender, family history, and lifestyle habits  For example, colorectal screening may be recommended if you are 48years old or older  Screening tests you need if you are a woman:   · A Pap smear  is used to screen for cervical cancer   Pap smears are usually done every 3 to 5 years depending on your age  You may need them more often if you have had abnormal Pap smear test results in the past  Ask your healthcare provider how often you should have a Pap smear  · A mammogram  is an x-ray of your breasts to screen for breast cancer  Experts recommend mammograms every 2 years starting at age 48 years  You may need a mammogram at age 52 years or younger if you have an increased risk for breast cancer  Talk to your healthcare provider about when you should start having mammograms and how often you need them  Vaccines you may need:   · Get an influenza vaccine  every year  The influenza vaccine protects you from the flu  Several types of viruses cause the flu  The viruses change over time, so new vaccines are made each year  · Get a tetanus-diphtheria (Td) booster vaccine  every 10 years  This vaccine protects you against tetanus and diphtheria  Tetanus is a severe infection that may cause painful muscle spasms and lockjaw  Diphtheria is a severe bacterial infection that causes a thick covering in the back of your mouth and throat  · Get a human papillomavirus (HPV) vaccine  if you are female and aged 23 to 32 or male 23 to 24 and never received it  This vaccine protects you from HPV infection  HPV is the most common infection spread by sexual contact  HPV may also cause vaginal, penile, and anal cancers  · Get a pneumococcal vaccine  if you are aged 72 years or older  The pneumococcal vaccine is an injection given to protect you from pneumococcal disease  Pneumococcal disease is an infection caused by pneumococcal bacteria  The infection may cause pneumonia, meningitis, or an ear infection  · Get a shingles vaccine  if you are aged 61 or older, even if you have had shingles before  The shingles vaccine is an injection to protect you from the varicella-zoster virus  This is the same virus that causes chickenpox   Shingles is a painful rash that develops in people who had chickenpox or have been exposed to the virus  How to eat healthy:  My Plate is a model for planning healthy meals  It shows the types and amounts of foods that should go on your plate  Fruits and vegetables make up about half of your plate, and grains and protein make up the other half  A serving of dairy is included on the side of your plate  The amount of calories and serving sizes you need depends on your age, gender, weight, and height  Examples of healthy foods are listed below:  · Eat a variety of vegetables  such as dark green, red, and orange vegetables  You can also include canned vegetables low in sodium (salt) and frozen vegetables without added butter or sauces  · Eat a variety of fresh fruits , canned fruit in 100% juice, frozen fruit, and dried fruit  · Include whole grains  At least half of the grains you eat should be whole grains  Examples include whole-wheat bread, wheat pasta, brown rice, and whole-grain cereals such as oatmeal     · Eat a variety of protein foods such as seafood (fish and shellfish), lean meat, and poultry without skin (turkey and chicken)  Examples of lean meats include pork leg, shoulder, or tenderloin, and beef round, sirloin, tenderloin, and extra lean ground beef  Other protein foods include eggs and egg substitutes, beans, peas, soy products, nuts, and seeds  · Choose low-fat dairy products such as skim or 1% milk or low-fat yogurt, cheese, and cottage cheese  · Limit unhealthy fats  such as butter, hard margarine, and shortening  Exercise:  Exercise at least 30 minutes per day on most days of the week  Some examples of exercise include walking, biking, dancing, and swimming  You can also fit in more physical activity by taking the stairs instead of the elevator or parking farther away from stores  Include muscle strengthening activities 2 days each week  Regular exercise provides many health benefits   It helps you manage your weight, and decreases your risk for type 2 diabetes, heart disease, stroke, and high blood pressure  Exercise can also help improve your mood  Ask your healthcare provider about the best exercise plan for you  General health and safety guidelines:   · Do not smoke  Nicotine and other chemicals in cigarettes and cigars can cause lung damage  Ask your healthcare provider for information if you currently smoke and need help to quit  E-cigarettes or smokeless tobacco still contain nicotine  Talk to your healthcare provider before you use these products  · Limit alcohol  A drink of alcohol is 12 ounces of beer, 5 ounces of wine, or 1½ ounces of liquor  · Lose weight, if needed  Being overweight increases your risk of certain health conditions  These include heart disease, high blood pressure, type 2 diabetes, and certain types of cancer  · Protect your skin  Do not sunbathe or use tanning beds  Use sunscreen with a SPF 15 or higher  Apply sunscreen at least 15 minutes before you go outside  Reapply sunscreen every 2 hours  Wear protective clothing, hats, and sunglasses when you are outside  · Drive safely  Always wear your seatbelt  Make sure everyone in your car wears a seatbelt  A seatbelt can save your life if you are in an accident  Do not use your cell phone when you are driving  This could distract you and cause an accident  Pull over if you need to make a call or send a text message  · Practice safe sex  Use latex condoms if are sexually active and have more than one partner  Your healthcare provider may recommend screening tests for sexually transmitted infections (STIs)  · Wear helmets, lifejackets, and protective gear  Always wear a helmet when you ride a bike or motorcycle, go skiing, or play sports that could cause a head injury  Wear protective equipment when you play sports  Wear a lifejacket when you are on a boat or doing water sports    © 2017 [employer] Information is for End User's use only and may not be sold, redistributed or otherwise used for commercial purposes  All illustrations and images included in CareNotes® are the copyrighted property of [employer]  or [employer]  The above information is an  only  It is not intended as medical advice for individual conditions or treatments  Talk to your doctor, nurse or pharmacist before following any medical regimen to see if it is safe and effective for you  Weight Management   AMBULATORY CARE:   Why it is important to manage your weight:  Being overweight increases your risk of health conditions such as heart disease, high blood pressure, type 2 diabetes, and certain types of cancer  It can also increase your risk for osteoarthritis, sleep apnea, and other respiratory problems  Aim for a slow, steady weight loss  Even a small amount of weight loss can lower your risk of health problems  How to lose weight safely:  A safe and healthy way to lose weight is to eat fewer calories and get regular exercise  You can lose up about 1 pound a week by decreasing the number of calories you eat by 500 calories each day  You can decrease calories by eating smaller portion sizes or by cutting out high-calorie foods  Read labels to find out how many calories are in the foods you eat  You can also burn calories with exercise such as walking, swimming, or biking  You will be more likely to keep weight off if you make these changes part of your lifestyle  Healthy meal plan for weight management:  A healthy meal plan includes a variety of foods, contains fewer calories, and helps you stay healthy  A healthy meal plan includes the following:  · Eat whole-grain foods more often  A healthy meal plan should contain fiber  Fiber is the part of grains, fruits, and vegetables that is not broken down by your body  Whole-grain foods are healthy and provide extra fiber in your diet   Some examples of whole-grain foods are whole-wheat breads and pastas, oatmeal, brown rice, and bulgur  · Eat a variety of vegetables every day  Include dark, leafy greens such as spinach, kale, elisa greens, and mustard greens  Eat yellow and orange vegetables such as carrots, sweet potatoes, and winter squash  · Eat a variety of fruits every day  Choose fresh or canned fruit (canned in its own juice or light syrup) instead of juice  Fruit juice has very little or no fiber  · Eat low-fat dairy foods  Drink fat-free (skim) milk or 1% milk  Eat fat-free yogurt and low-fat cottage cheese  Try low-fat cheeses such as mozzarella and other reduced-fat cheeses  · Choose meat and other protein foods that are low in fat  Choose beans or other legumes such as split peas or lentils  Choose fish, skinless poultry (chicken or turkey), or lean cuts of red meat (beef or pork)  Before you cook meat or poultry, cut off any visible fat  · Use less fat and oil  Try baking foods instead of frying them  Add less fat, such as margarine, sour cream, regular salad dressing and mayonnaise to foods  Eat fewer high-fat foods  Some examples of high-fat foods include french fries, doughnuts, ice cream, and cakes  · Eat fewer sweets  Limit foods and drinks that are high in sugar  This includes candy, cookies, regular soda, and sweetened drinks  Ways to decrease calories:   · Eat smaller portions  ¨ Use a small plate with smaller servings  ¨ Do not eat second helpings  ¨ When you eat at a restaurant, ask for a box and place half of your meal in the box before you eat  ¨ Share an entrée with someone else  · Replace high-calorie snacks with healthy, low-calorie snacks  ¨ Choose fresh fruit, vegetables, fat-free rice cakes, or air-popped popcorn instead of potato chips, nuts, or chocolate  ¨ Choose water or calorie-free drinks instead of soda or sweetened drinks  · Eat regular meals  Skipping meals can lead to overeating later in the day   Eat a healthy snack in place of a meal if you do not have time to eat a regular meal      · Do not shop for groceries when you are hungry  You may be more likely to make unhealthy food choices  Take a grocery list of healthy foods and shop after you have eaten  Exercise:  Exercise at least 30 minutes per day on most days of the week  Some examples of exercise include walking, biking, dancing, and swimming  You can also fit in more physical activity by taking the stairs instead of the elevator or parking farther away from stores  Ask your healthcare provider about the best exercise plan for you  Other things to consider as you try to lose weight:   · Be aware of situations that may give you the urge to overeat, such as eating while watching television  Find ways to avoid these situations  For example, read a book, go for a walk, or do crafts  · Meet with a weight loss support group or friends who are also trying to lose weight  This may help you stay motivated to continue working on your weight loss goals  © 2017 [address] [employer]  Information is for End User's use only and may not be sold, redistributed or otherwise used for commercial purposes  All illustrations and images included in CareNotes® are the copyrighted property of [employer]  or [employer] Katie  The above information is an  only  It is not intended as medical advice for individual conditions or treatments  Talk to your doctor, nurse or pharmacist before following any medical regimen to see if it is safe and effective for you  Weight Management   AMBULATORY CARE:   Why it is important to manage your weight:  Being overweight increases your risk of health conditions such as heart disease, high blood pressure, type 2 diabetes, and certain types of cancer  It can also increase your risk for osteoarthritis, sleep apnea, and other respiratory problems  Aim for a slow, steady weight loss   Even a small amount of weight loss can lower your risk of health problems  How to lose weight safely:  A safe and healthy way to lose weight is to eat fewer calories and get regular exercise  You can lose up about 1 pound a week by decreasing the number of calories you eat by 500 calories each day  You can decrease calories by eating smaller portion sizes or by cutting out high-calorie foods  Read labels to find out how many calories are in the foods you eat  You can also burn calories with exercise such as walking, swimming, or biking  You will be more likely to keep weight off if you make these changes part of your lifestyle  Healthy meal plan for weight management:  A healthy meal plan includes a variety of foods, contains fewer calories, and helps you stay healthy  A healthy meal plan includes the following:  · Eat whole-grain foods more often  A healthy meal plan should contain fiber  Fiber is the part of grains, fruits, and vegetables that is not broken down by your body  Whole-grain foods are healthy and provide extra fiber in your diet  Some examples of whole-grain foods are whole-wheat breads and pastas, oatmeal, brown rice, and bulgur  · Eat a variety of vegetables every day  Include dark, leafy greens such as spinach, kale, elisa greens, and mustard greens  Eat yellow and orange vegetables such as carrots, sweet potatoes, and winter squash  · Eat a variety of fruits every day  Choose fresh or canned fruit (canned in its own juice or light syrup) instead of juice  Fruit juice has very little or no fiber  · Eat low-fat dairy foods  Drink fat-free (skim) milk or 1% milk  Eat fat-free yogurt and low-fat cottage cheese  Try low-fat cheeses such as mozzarella and other reduced-fat cheeses  · Choose meat and other protein foods that are low in fat  Choose beans or other legumes such as split peas or lentils  Choose fish, skinless poultry (chicken or turkey), or lean cuts of red meat (beef or pork)   Before you cook meat or poultry, cut off any visible fat  · Use less fat and oil  Try baking foods instead of frying them  Add less fat, such as margarine, sour cream, regular salad dressing and mayonnaise to foods  Eat fewer high-fat foods  Some examples of high-fat foods include french fries, doughnuts, ice cream, and cakes  · Eat fewer sweets  Limit foods and drinks that are high in sugar  This includes candy, cookies, regular soda, and sweetened drinks  Ways to decrease calories:   · Eat smaller portions  ¨ Use a small plate with smaller servings  ¨ Do not eat second helpings  ¨ When you eat at a restaurant, ask for a box and place half of your meal in the box before you eat  ¨ Share an entrée with someone else  · Replace high-calorie snacks with healthy, low-calorie snacks  ¨ Choose fresh fruit, vegetables, fat-free rice cakes, or air-popped popcorn instead of potato chips, nuts, or chocolate  ¨ Choose water or calorie-free drinks instead of soda or sweetened drinks  · Eat regular meals  Skipping meals can lead to overeating later in the day  Eat a healthy snack in place of a meal if you do not have time to eat a regular meal      · Do not shop for groceries when you are hungry  You may be more likely to make unhealthy food choices  Take a grocery list of healthy foods and shop after you have eaten  Exercise:  Exercise at least 30 minutes per day on most days of the week  Some examples of exercise include walking, biking, dancing, and swimming  You can also fit in more physical activity by taking the stairs instead of the elevator or parking farther away from stores  Ask your healthcare provider about the best exercise plan for you  Other things to consider as you try to lose weight:   · Be aware of situations that may give you the urge to overeat, such as eating while watching television  Find ways to avoid these situations  For example, read a book, go for a walk, or do crafts      · Meet with a weight loss support group or friends who are also trying to lose weight  This may help you stay motivated to continue working on your weight loss goals  © 2017 [address] [employer] Information is for End User's use only and may not be sold, redistributed or otherwise used for commercial purposes  All illustrations and images included in CareNotes® are the copyrighted property of [employer]  or [employer] Wilson  The above information is an  only  It is not intended as medical advice for individual conditions or treatments  Talk to your doctor, nurse or pharmacist before following any medical regimen to see if it is safe and effective for you  Heart Healthy Diet   AMBULATORY CARE:   A heart healthy diet  is an eating plan low in total fat, unhealthy fats, and sodium (salt)  A heart healthy diet helps decrease your risk for heart disease and stroke  Limit the amount of fat you eat to 25% to 35% of your total daily calories  Limit sodium to less than 2,300 mg each day  Healthy fats:  Healthy fats can help improve cholesterol levels  The risk for heart disease is decreased when cholesterol levels are normal  Choose healthy fats, such as the following:  · Unsaturated fat  is found in foods such as soybean, canola, olive, corn, and safflower oils  It is also found in soft tub margarine that is made with liquid vegetable oil  · Omega-3 fat  is found in certain fish, such as salmon, tuna, and trout, and in walnuts and flaxseed  Unhealthy fats:  Unhealthy fats can cause unhealthy cholesterol levels in your blood and increase your risk of heart disease  Limit unhealthy fats, such as the following:  · Cholesterol  is found in animal foods, such as eggs and lobster, and in dairy products made from whole milk  Limit cholesterol to less than 300 milligrams (mg) each day  You may need to limit cholesterol to 200 mg each day if you have heart disease       · Saturated fat  is found in meats, such as obrien and hamburger  It is also found in chicken or turkey skin, whole milk, and butter  Limit saturated fat to less than 7% of your total daily calories  Limit saturated fat to less than 6% if you have heart disease or are at increased risk for it  · Trans fat  is found in packaged foods, such as potato chips and cookies  It is also in hard margarine, some fried foods, and shortening  Avoid trans fats as much as possible    Heart healthy foods and drinks to include:  Ask your dietitian or healthcare provider how many servings to have from each of the following food groups:  · Grains:      ¨ Whole-wheat breads, cereals, and pastas, and brown rice    ¨ Low-fat, low-sodium crackers and chips    · Vegetables:      ¨ Broccoli, green beans, green peas, and spinach    ¨ Collards, kale, and lima beans    ¨ Carrots, sweet potatoes, tomatoes, and peppers    ¨ Canned vegetables with no salt added    · Fruits:      ¨ Bananas, peaches, pears, and pineapple    ¨ Grapes, raisins, and dates    ¨ Oranges, tangerines, grapefruit, orange juice, and grapefruit juice    ¨ Apricots, mangoes, melons, and papaya    ¨ Raspberries and strawberries    ¨ Canned fruit with no added sugar    · Low-fat dairy products:      ¨ Nonfat (skim) milk, 1% milk, and low-fat almond, cashew, or soy milks fortified with calcium    ¨ Low-fat cheese, regular or frozen yogurt, and cottage cheese    · Meats and proteins , such as lean cuts of beef and pork (loin, leg, round), skinless chicken and turkey, legumes, soy products, egg whites, and nuts  Foods and drinks to limit or avoid:  Ask your dietitian or healthcare provider about these and other foods that are high in unhealthy fat, sodium, and sugar:  · Snack or packaged foods , such as frozen dinners, cookies, macaroni and cheese, and cereals with more than 300 mg of sodium per serving    · Canned or dry mixes  for cakes, soups, sauces, or gravies    · Vegetables with added sodium , such as instant potatoes, vegetables with added sauces, or regular canned vegetables    · Other foods high in sodium , such as ketchup, barbecue sauce, salad dressing, pickles, olives, soy sauce, and miso    · High-fat dairy foods  such as whole or 2% milk, cream cheese, or sour cream, and cheeses     · High-fat protein foods  such as high-fat cuts of beef (T-bone steaks, ribs), chicken or turkey with skin, and organ meats, such as liver    · Cured or smoked meats , such as hot dogs, obrien, and sausage    · Unhealthy fats and oils , such as butter, stick margarine, shortening, and cooking oils such as coconut or palm oil    · Food and drinks high in sugar , such as soft drinks (soda), sports drinks, sweetened tea, candy, cake, cookies, pies, and doughnuts  Other diet guidelines to follow:   · Eat more foods containing omega-3 fats  Eat fish high in omega-3 fats at least 2 times a week  · Limit alcohol  Too much alcohol can damage your heart and raise your blood pressure  Women should limit alcohol to 1 drink a day  Men should limit alcohol to 2 drinks a day  A drink of alcohol is 12 ounces of beer, 5 ounces of wine, or 1½ ounces of liquor  · Choose low-sodium foods  High-sodium foods can lead to high blood pressure  Add little or no salt to food you prepare  Use herbs and spices in place of salt  · Eat more fiber  to help lower cholesterol levels  Eat at least 5 servings of fruits and vegetables each day  Eat 3 ounces of whole-grain foods each day  Legumes (beans) are also a good source of fiber  Lifestyle guidelines:   · Do not smoke  Nicotine and other chemicals in cigarettes and cigars can cause lung and heart damage  Ask your healthcare provider for information if you currently smoke and need help to quit  E-cigarettes or smokeless tobacco still contain nicotine  Talk to your healthcare provider before you use these products       · Exercise regularly  to help you maintain a healthy weight and improve your blood pressure and cholesterol levels  Ask your healthcare provider about the best exercise plan for you  Do not start an exercise program without asking your healthcare provider  Follow up with your healthcare provider as directed:  Write down your questions so you remember to ask them during your visits  © 2017 [address] [employer] Information is for End User's use only and may not be sold, redistributed or otherwise used for commercial purposes  All illustrations and images included in CareNotes® are the copyrighted property of [employer]  or [employer] Wilson  The above information is an  only  It is not intended as medical advice for individual conditions or treatments  Talk to your doctor, nurse or pharmacist before following any medical regimen to see if it is safe and effective for you

## 2019-09-30 NOTE — PROGRESS NOTES
Assessment/Plan:  Continue healthy diet exercise  Stay well hydrated  I will call with the lab test results  Follow-up if not a lot better in 2-4 weeks  Diagnoses and all orders for this visit:    Annual physical exam  -     Lipid panel  -     Comprehensive metabolic panel  -     CBC and differential  -     TSH, 3rd generation with Free T4 reflex  -     Vitamin D 25 hydroxy  -     Iron, TIBC and Ferritin Panel  -     Vitamin B12    BMI 30 0-30 9,adult    Orthostatic hypotension  -     Comprehensive metabolic panel  -     CBC and differential  -     TSH, 3rd generation with Free T4 reflex  -     Vitamin D 25 hydroxy  -     Iron, TIBC and Ferritin Panel  -     Vitamin B12          Subjective:      Patient ID: Sarika Mittal is a 37 y o  female  She complains of dizziness over the last month or so  She says whenever she stands up squats down she gets dizzy  She feels like she is spinning  No syncope, nausea or vomiting  The following portions of the patient's history were reviewed and updated as appropriate: allergies, current medications, past family history, past medical history, past social history, past surgical history and problem list     Review of Systems   Constitutional: Negative  HENT: Negative  Eyes: Negative  Respiratory: Negative  Cardiovascular: Negative  Gastrointestinal: Negative  Endocrine: Negative  Genitourinary: Negative  Musculoskeletal: Negative  Skin: Negative  Allergic/Immunologic: Negative  Neurological: Positive for dizziness  Hematological: Negative  Psychiatric/Behavioral: Negative  All other systems reviewed and are negative  Objective:      /68 (BP Location: Left arm)   Pulse 60   Temp (!) 97 3 °F (36 3 °C)   Ht 5' 6" (1 676 m)   Wt 84 8 kg (187 lb)   BMI 30 18 kg/m²          Physical Exam   Constitutional: She is oriented to person, place, and time  She appears well-developed and well-nourished     HENT: Head: Normocephalic and atraumatic  Right Ear: External ear normal    Left Ear: External ear normal    Nose: Nose normal    Mouth/Throat: Oropharynx is clear and moist    Eyes: Pupils are equal, round, and reactive to light  Conjunctivae and EOM are normal    Neck: Normal range of motion  Neck supple  Cardiovascular: Normal rate, regular rhythm and normal heart sounds  Pulmonary/Chest: Effort normal and breath sounds normal    Abdominal: Soft  Bowel sounds are normal    Musculoskeletal: Normal range of motion  Neurological: She is alert and oriented to person, place, and time  She has normal reflexes  Skin: Skin is warm and dry  Psychiatric: She has a normal mood and affect  Her behavior is normal    Nursing note and vitals reviewed

## 2019-09-30 NOTE — PROGRESS NOTES
ADULT ANNUAL PHYSICAL  [address]    NAME: Wilma Mendes  AGE: 37 y o  SEX: female  : 1980     DATE: 2019     Assessment and Plan:     Problem List Items Addressed This Visit     None          Immunizations and preventive care screenings were discussed with patient today  Appropriate education was printed on patient's after visit summary  Counseling:  Alcohol/drug use: discussed moderation in alcohol intake, the recommendations for healthy alcohol use, and avoidance of illicit drug use  Dental Health: discussed importance of regular tooth brushing, flossing, and dental visits  Injury prevention: discussed safety/seat belts, safety helmets, smoke detectors, carbon dioxide detectors, and smoking near bedding or upholstery  Sexual health: discussed sexually transmitted diseases, partner selection, use of condoms, avoidance of unintended pregnancy, and contraceptive alternatives  · Exercise: the importance of regular exercise/physical activity was discussed  Recommend exercise 3-5 times per week for at least 30 minutes  No follow-ups on file  Chief Complaint:     Chief Complaint   Patient presents with    Dizziness     Patient reports a on set of dizzness past month for no apparent reason  No changes in daily activities or medications  History of Present Illness:     Adult Annual Physical   Patient here for a comprehensive physical exam  The patient reports problems - dizziness  Diet and Physical Activity  · Diet/Nutrition: well balanced diet and heart healthy (low sodium) diet  · Exercise: vigorous cardiovascular exercise  Depression Screening  PHQ-9 Depression Screening    PHQ-9:    Frequency of the following problems over the past two weeks:            General Health  · Sleep: sleeps poorly  · Hearing: normal - bilateral   · Vision: no vision problems  · Dental: regular dental visits         /GYN Health  · Patient is: premenopausal  · Last menstrual period: monthly  · Contraceptive method: tubal      Review of Systems:     Review of Systems   Constitutional: Negative  HENT: Negative  Eyes: Negative  Respiratory: Negative  Cardiovascular: Negative  Gastrointestinal: Negative  Endocrine: Negative  Genitourinary: Negative  Musculoskeletal: Negative  Skin: Negative  Allergic/Immunologic: Negative  Neurological: Positive for dizziness  Negative for seizures, syncope, speech difficulty and headaches  Hematological: Negative  Psychiatric/Behavioral: Negative  All other systems reviewed and are negative       Past Medical History:     Past Medical History:   Diagnosis Date    Allergic rhinitis     Asthma     Complex ovarian cyst     last assessed: 2014    Condyloma acuminatum     Depression     Dysmenorrhea     last assessed: 2013    H/O human papillomavirus infection     Migraine     Palpitations     last assessed: 2013      Past Surgical History:     Past Surgical History:   Procedure Laterality Date    CERVICAL BIOPSY  W/ LOOP ELECTRODE EXCISION       SECTION      low transverse    KNEE SURGERY      REDUCTION MAMMAPLASTY  2008    ROTATOR CUFF REPAIR  2014    TUBAL LIGATION        Social History:     Social History     Socioeconomic History    Marital status: /Civil Union     Spouse name: Not on file    Number of children: Not on file    Years of education: Not on file    Highest education level: Not on file   Occupational History    Not on file   Social Needs    Financial resource strain: Not on file    Food insecurity:     Worry: Not on file     Inability: Not on file    Transportation needs:     Medical: Not on file     Non-medical: Not on file   Tobacco Use    Smoking status: Former Smoker    Smokeless tobacco: Never Used    Tobacco comment: former smoker (as per allscripts)   Substance and Sexual Activity    Alcohol use: No    Drug use: No    Sexual activity: Yes     Partners: Male     Birth control/protection: Female Sterilization   Lifestyle    Physical activity:     Days per week: Not on file     Minutes per session: Not on file    Stress: Not on file   Relationships    Social connections:     Talks on phone: Not on file     Gets together: Not on file     Attends Anabaptism service: Not on file     Active member of club or organization: Not on file     Attends meetings of clubs or organizations: Not on file     Relationship status: Not on file    Intimate partner violence:     Fear of current or ex partner: Not on file     Emotionally abused: Not on file     Physically abused: Not on file     Forced sexual activity: Not on file   Other Topics Concern    Not on file   Social History Narrative    Always uses seat belt    Denied: history of daily caffeinated coffee consumption    Exercises strenuously less than 3 times a week      Family History:     Family History   Problem Relation Age of Onset    Breast cancer Mother 37    Cancer Father     Breast cancer Paternal Grandmother     Breast cancer Paternal Aunt 46    Cancer Maternal Grandfather       Current Medications:     Current Outpatient Medications   Medication Sig Dispense Refill    albuterol (VENTOLIN HFA) 90 mcg/act inhaler Inhale 2 puffs every 6 (six) hours as needed for wheezing      escitalopram (LEXAPRO) 5 mg tablet Take 1 tablet (5 mg total) by mouth daily for 180 days 30 tablet 2    VENTOLIN  (90 Base) MCG/ACT inhaler inhale 2 puffs by mouth every 6 hours if needed for wheezing 18 g 3     No current facility-administered medications for this visit  Allergies:      Allergies   Allergen Reactions    Pollen Extract Allergic Rhinitis    Meperidine Rash    Other      Annotation - [id number]: mushroom      Physical Exam:     /68 (BP Location: Left arm)   Pulse 60   Temp (!) 97 3 °F (36 3 °C)   Ht 5' 6" (1 676 m) Wt 84 8 kg (187 lb)   BMI 30 18 kg/m²     Physical Exam   Constitutional: She is oriented to person, place, and time  She appears well-developed and well-nourished  HENT:   Head: Normocephalic and atraumatic  Right Ear: External ear normal    Left Ear: External ear normal    Nose: Nose normal    Mouth/Throat: Oropharynx is clear and moist    Eyes: Pupils are equal, round, and reactive to light  Conjunctivae and EOM are normal    Neck: Normal range of motion  Neck supple  Cardiovascular: Normal rate, regular rhythm and normal heart sounds  Pulmonary/Chest: Effort normal and breath sounds normal    Abdominal: Soft  Bowel sounds are normal    Musculoskeletal: Normal range of motion  Neurological: She is alert and oriented to person, place, and time  She has normal reflexes  Skin: Skin is warm and dry  Psychiatric: She has a normal mood and affect  Her behavior is normal    Nursing note and vitals reviewed  Umesh Olmos DO  [address]  BMI Counseling: Body mass index is 30 18 kg/m²  The BMI is above normal  Nutrition recommendations include reducing portion sizes, decreasing overall calorie intake, 3-5 servings of fruits/vegetables daily, reducing fast food intake, consuming healthier snacks, decreasing soda and/or juice intake, moderation in carbohydrate intake, increasing intake of lean protein, reducing intake of saturated fat and trans fat and reducing intake of cholesterol  Exercise recommendations include moderate aerobic physical activity for 150 minutes/week and vigorous aerobic physical activity for 75 minutes/week

## 2019-10-02 ENCOUNTER — APPOINTMENT (OUTPATIENT)
Dept: LAB | Age: 39
End: 2019-10-02
Payer: COMMERCIAL

## 2019-10-02 DIAGNOSIS — I95.1 ORTHOSTATIC HYPOTENSION: Primary | ICD-10-CM

## 2019-10-02 LAB
25(OH)D3 SERPL-MCNC: 29.7 NG/ML (ref 30–100)
ALBUMIN SERPL BCP-MCNC: 4.4 G/DL (ref 3.5–5)
ALP SERPL-CCNC: 51 U/L (ref 46–116)
ALT SERPL W P-5'-P-CCNC: 22 U/L (ref 12–78)
ANION GAP SERPL CALCULATED.3IONS-SCNC: 4 MMOL/L (ref 4–13)
AST SERPL W P-5'-P-CCNC: 12 U/L (ref 5–45)
BASOPHILS # BLD AUTO: 0.03 THOUSANDS/ΜL (ref 0–0.1)
BASOPHILS NFR BLD AUTO: 1 % (ref 0–1)
BILIRUB SERPL-MCNC: 0.53 MG/DL (ref 0.2–1)
BUN SERPL-MCNC: 15 MG/DL (ref 5–25)
CALCIUM SERPL-MCNC: 9.3 MG/DL (ref 8.3–10.1)
CHLORIDE SERPL-SCNC: 107 MMOL/L (ref 100–108)
CHOLEST SERPL-MCNC: 146 MG/DL (ref 50–200)
CO2 SERPL-SCNC: 30 MMOL/L (ref 21–32)
CREAT SERPL-MCNC: 0.76 MG/DL (ref 0.6–1.3)
EOSINOPHIL # BLD AUTO: 0.15 THOUSAND/ΜL (ref 0–0.61)
EOSINOPHIL NFR BLD AUTO: 3 % (ref 0–6)
ERYTHROCYTE [DISTWIDTH] IN BLOOD BY AUTOMATED COUNT: 12.2 % (ref 11.6–15.1)
FERRITIN SERPL-MCNC: 17 NG/ML (ref 8–388)
GFR SERPL CREATININE-BSD FRML MDRD: 98 ML/MIN/1.73SQ M
GLUCOSE P FAST SERPL-MCNC: 87 MG/DL (ref 65–99)
HCT VFR BLD AUTO: 40.7 % (ref 34.8–46.1)
HDLC SERPL-MCNC: 44 MG/DL (ref 40–60)
HGB BLD-MCNC: 13.3 G/DL (ref 11.5–15.4)
IMM GRANULOCYTES # BLD AUTO: 0.01 THOUSAND/UL (ref 0–0.2)
IMM GRANULOCYTES NFR BLD AUTO: 0 % (ref 0–2)
IRON SERPL-MCNC: 109 UG/DL (ref 50–170)
LDLC SERPL CALC-MCNC: 82 MG/DL (ref 0–100)
LYMPHOCYTES # BLD AUTO: 1.5 THOUSANDS/ΜL (ref 0.6–4.47)
LYMPHOCYTES NFR BLD AUTO: 27 % (ref 14–44)
MCH RBC QN AUTO: 30.4 PG (ref 26.8–34.3)
MCHC RBC AUTO-ENTMCNC: 32.7 G/DL (ref 31.4–37.4)
MCV RBC AUTO: 93 FL (ref 82–98)
MONOCYTES # BLD AUTO: 0.37 THOUSAND/ΜL (ref 0.17–1.22)
MONOCYTES NFR BLD AUTO: 7 % (ref 4–12)
NEUTROPHILS # BLD AUTO: 3.44 THOUSANDS/ΜL (ref 1.85–7.62)
NEUTS SEG NFR BLD AUTO: 62 % (ref 43–75)
NONHDLC SERPL-MCNC: 102 MG/DL
NRBC BLD AUTO-RTO: 0 /100 WBCS
PLATELET # BLD AUTO: 214 THOUSANDS/UL (ref 149–390)
PMV BLD AUTO: 11.3 FL (ref 8.9–12.7)
POTASSIUM SERPL-SCNC: 4.4 MMOL/L (ref 3.5–5.3)
PROT SERPL-MCNC: 7.5 G/DL (ref 6.4–8.2)
RBC # BLD AUTO: 4.38 MILLION/UL (ref 3.81–5.12)
SODIUM SERPL-SCNC: 141 MMOL/L (ref 136–145)
TIBC SERPL-MCNC: 365 UG/DL (ref 250–450)
TRIGL SERPL-MCNC: 98 MG/DL
TSH SERPL DL<=0.05 MIU/L-ACNC: 1.04 UIU/ML (ref 0.36–3.74)
VIT B12 SERPL-MCNC: 361 PG/ML (ref 100–900)
WBC # BLD AUTO: 5.5 THOUSAND/UL (ref 4.31–10.16)

## 2019-10-02 PROCEDURE — 80061 LIPID PANEL: CPT | Performed by: FAMILY MEDICINE

## 2019-10-02 PROCEDURE — 36415 COLL VENOUS BLD VENIPUNCTURE: CPT | Performed by: FAMILY MEDICINE

## 2019-10-02 PROCEDURE — 82306 VITAMIN D 25 HYDROXY: CPT | Performed by: FAMILY MEDICINE

## 2019-10-02 PROCEDURE — 80053 COMPREHEN METABOLIC PANEL: CPT | Performed by: FAMILY MEDICINE

## 2019-10-02 PROCEDURE — 82728 ASSAY OF FERRITIN: CPT

## 2019-10-02 PROCEDURE — 82607 VITAMIN B-12: CPT | Performed by: FAMILY MEDICINE

## 2019-10-02 PROCEDURE — 83540 ASSAY OF IRON: CPT

## 2019-10-02 PROCEDURE — 84443 ASSAY THYROID STIM HORMONE: CPT | Performed by: FAMILY MEDICINE

## 2019-10-02 PROCEDURE — 85025 COMPLETE CBC W/AUTO DIFF WBC: CPT | Performed by: FAMILY MEDICINE

## 2019-10-02 PROCEDURE — 83550 IRON BINDING TEST: CPT

## 2019-10-15 ENCOUNTER — TELEPHONE (OUTPATIENT)
Dept: FAMILY MEDICINE CLINIC | Facility: CLINIC | Age: 39
End: 2019-10-15

## 2019-10-15 ENCOUNTER — OFFICE VISIT (OUTPATIENT)
Dept: FAMILY MEDICINE CLINIC | Facility: CLINIC | Age: 39
End: 2019-10-15
Payer: COMMERCIAL

## 2019-10-15 VITALS
HEART RATE: 74 BPM | SYSTOLIC BLOOD PRESSURE: 122 MMHG | DIASTOLIC BLOOD PRESSURE: 70 MMHG | OXYGEN SATURATION: 98 % | BODY MASS INDEX: 30.18 KG/M2 | WEIGHT: 187 LBS | TEMPERATURE: 97.6 F

## 2019-10-15 DIAGNOSIS — L30.9 DERMATITIS: Primary | ICD-10-CM

## 2019-10-15 PROCEDURE — 99213 OFFICE O/P EST LOW 20 MIN: CPT | Performed by: FAMILY MEDICINE

## 2019-10-15 RX ORDER — TRIAMCINOLONE ACETONIDE 1 MG/G
CREAM TOPICAL 2 TIMES DAILY
Qty: 30 G | Refills: 0 | Status: SHIPPED | OUTPATIENT
Start: 2019-10-15 | End: 2020-09-07

## 2019-10-15 NOTE — PROGRESS NOTES
Assessment/Plan:    40-year-old woman with:  Dermatitis likely exacerbated by excoriation  Discussed trying to limit scratching and will take systemic antihistamines and give topical steroid along with Sarna lotion as needed for the itching  Advised to call back if not improving or worsening within 1-2 weeks    No problem-specific Assessment & Plan notes found for this encounter  Diagnoses and all orders for this visit:    Dermatitis  -     triamcinolone (KENALOG) 0 1 % cream; Apply topically 2 (two) times a day  -     camphor-menthol (SARNA) lotion; Apply topically as needed for itching          Subjective:     Chief Complaint   Patient presents with   [address]     left leg, lesion open about a month        Patient ID: Teresa Yang is a 37 y o  female  Patient is a 40-year-old woman who presents complaining of a red itchy rash in her left leg  About a month ago she admits that she had a bug bite like a mosquito bite and it resolved but has been itchy since then and she continues to scratch at it is getting more itchy inflamed  It is not spreading elsewhere  No possibility of tick bite  No fevers chills nausea vomiting or joint pains  No other complaints at this time      The following portions of the patient's history were reviewed and updated as appropriate: allergies, current medications, past family history, past medical history, past social history, past surgical history and problem list     Review of Systems   Constitutional: Negative  HENT: Negative  Eyes: Negative  Respiratory: Negative  Cardiovascular: Negative  Gastrointestinal: Negative  Endocrine: Negative  Genitourinary: Negative  Musculoskeletal: Negative  Skin: Positive for rash  Allergic/Immunologic: Negative  Neurological: Negative  Hematological: Negative  Psychiatric/Behavioral: Negative  All other systems reviewed and are negative          Objective:      /70   Pulse 74 Temp 97 6 °F (36 4 °C)   Wt 84 8 kg (187 lb)   SpO2 98%   BMI 30 18 kg/m²          Physical Exam   Constitutional: She is oriented to person, place, and time  She appears well-developed and well-nourished  HENT:   Head: Atraumatic  Right Ear: External ear normal    Left Ear: External ear normal    Eyes: Pupils are equal, round, and reactive to light  Conjunctivae and EOM are normal    Neck: Normal range of motion  Pulmonary/Chest: Effort normal  No respiratory distress  Abdominal: She exhibits no distension  Musculoskeletal: Normal range of motion  Neurological: She is alert and oriented to person, place, and time  No cranial nerve deficit  Skin: Skin is warm and dry  3 cm annular erythema with excoriation on the left lower leg   Psychiatric: She has a normal mood and affect   Her behavior is normal  Judgment and thought content normal

## 2019-10-22 ENCOUNTER — CLINICAL SUPPORT (OUTPATIENT)
Dept: FAMILY MEDICINE CLINIC | Facility: CLINIC | Age: 39
End: 2019-10-22
Payer: COMMERCIAL

## 2019-10-22 DIAGNOSIS — Z23 ENCOUNTER FOR IMMUNIZATION: Primary | ICD-10-CM

## 2019-10-22 PROCEDURE — 90472 IMMUNIZATION ADMIN EACH ADD: CPT | Performed by: FAMILY MEDICINE

## 2019-10-22 PROCEDURE — 90707 MMR VACCINE SC: CPT | Performed by: FAMILY MEDICINE

## 2019-10-22 PROCEDURE — 90471 IMMUNIZATION ADMIN: CPT | Performed by: FAMILY MEDICINE

## 2019-10-22 PROCEDURE — 90682 RIV4 VACC RECOMBINANT DNA IM: CPT | Performed by: FAMILY MEDICINE

## 2019-11-06 DIAGNOSIS — F32.A DEPRESSION, UNSPECIFIED DEPRESSION TYPE: ICD-10-CM

## 2019-11-06 RX ORDER — ESCITALOPRAM OXALATE 5 MG/1
5 TABLET ORAL DAILY
Qty: 30 TABLET | Refills: 0 | Status: SHIPPED | OUTPATIENT
Start: 2019-11-06 | End: 2019-12-03 | Stop reason: SDUPTHER

## 2019-12-03 ENCOUNTER — OFFICE VISIT (OUTPATIENT)
Dept: FAMILY MEDICINE CLINIC | Facility: CLINIC | Age: 39
End: 2019-12-03
Payer: COMMERCIAL

## 2019-12-03 VITALS
SYSTOLIC BLOOD PRESSURE: 108 MMHG | TEMPERATURE: 97.5 F | HEIGHT: 66 IN | BODY MASS INDEX: 30.86 KG/M2 | WEIGHT: 192 LBS | DIASTOLIC BLOOD PRESSURE: 74 MMHG

## 2019-12-03 DIAGNOSIS — F32.A DEPRESSION, UNSPECIFIED DEPRESSION TYPE: ICD-10-CM

## 2019-12-03 DIAGNOSIS — J40 BRONCHITIS: Primary | ICD-10-CM

## 2019-12-03 PROCEDURE — 1036F TOBACCO NON-USER: CPT | Performed by: FAMILY MEDICINE

## 2019-12-03 PROCEDURE — 3008F BODY MASS INDEX DOCD: CPT | Performed by: FAMILY MEDICINE

## 2019-12-03 PROCEDURE — 99214 OFFICE O/P EST MOD 30 MIN: CPT | Performed by: FAMILY MEDICINE

## 2019-12-03 RX ORDER — CEFUROXIME AXETIL 500 MG/1
500 TABLET ORAL EVERY 12 HOURS SCHEDULED
Qty: 14 TABLET | Refills: 0 | Status: SHIPPED | OUTPATIENT
Start: 2019-12-03 | End: 2019-12-10

## 2019-12-03 RX ORDER — ALBUTEROL SULFATE 90 UG/1
2 AEROSOL, METERED RESPIRATORY (INHALATION) EVERY 6 HOURS PRN
Qty: 1 INHALER | Refills: 2 | Status: SHIPPED | OUTPATIENT
Start: 2019-12-03 | End: 2020-06-08 | Stop reason: SDUPTHER

## 2019-12-03 RX ORDER — ESCITALOPRAM OXALATE 5 MG/1
5 TABLET ORAL DAILY
Qty: 30 TABLET | Refills: 5 | Status: SHIPPED | OUTPATIENT
Start: 2019-12-03 | End: 2020-05-31

## 2019-12-03 NOTE — PROGRESS NOTES
Assessment/Plan:    Recommend supportive care fluids and rest   Follow-up if not a lot better in 5 days  Diagnoses and all orders for this visit:    Bronchitis  -     cefuroxime (CEFTIN) 500 mg tablet; Take 1 tablet (500 mg total) by mouth every 12 (twelve) hours for 7 days  -     albuterol (VENTOLIN HFA) 90 mcg/act inhaler; Inhale 2 puffs every 6 (six) hours as needed for wheezing or shortness of breath    Depression, unspecified depression type  -     escitalopram (LEXAPRO) 5 mg tablet; Take 1 tablet (5 mg total) by mouth daily            Subjective:        Patient ID: Brynn Grajeda is a 37 y o  female  Patient presents with:  Cough: Heavy cough, not productive, casing back pain   Inhaler not helping  Cold Like Symptoms: [facility] on file for todays visit  Medication Refill            The following portions of the patient's history were reviewed and updated as appropriate: allergies, current medications, past family history, past medical history, past social history, past surgical history and problem list       Review of Systems   Constitutional: Negative  HENT: Negative  Eyes: Negative  Respiratory: Positive for cough, shortness of breath and wheezing  Cardiovascular: Positive for chest pain  Gastrointestinal: Negative  Endocrine: Negative  Genitourinary: Negative  Musculoskeletal: Negative  Skin: Negative  Allergic/Immunologic: Negative  Neurological: Negative  Hematological: Negative  Psychiatric/Behavioral: Negative  All other systems reviewed and are negative  Objective:             /74   Temp 97 5 °F (36 4 °C)   Ht 5' 6" (1 676 m)   Wt 87 1 kg (192 lb)   BMI 30 99 kg/m²          Physical Exam   Constitutional: She is oriented to person, place, and time  She appears well-developed and well-nourished  HENT:   Head: Normocephalic and atraumatic     Right Ear: External ear normal    Left Ear: External ear normal    Nose: Nose normal  Mouth/Throat: Oropharynx is clear and moist    Eyes: Pupils are equal, round, and reactive to light  Conjunctivae and EOM are normal    Neck: Normal range of motion  Neck supple  Cardiovascular: Normal rate, regular rhythm and normal heart sounds  Pulmonary/Chest: Effort normal  She has rhonchi  She has rales  Abdominal: Soft  Bowel sounds are normal    Musculoskeletal: Normal range of motion  Neurological: She is alert and oriented to person, place, and time  She has normal reflexes  Skin: Skin is warm and dry  Psychiatric: She has a normal mood and affect  Her behavior is normal    Nursing note and vitals reviewed

## 2019-12-24 ENCOUNTER — TRANSCRIBE ORDERS (OUTPATIENT)
Dept: ADMINISTRATIVE | Facility: HOSPITAL | Age: 39
End: 2019-12-24

## 2019-12-24 DIAGNOSIS — Z12.31 ENCOUNTER FOR SCREENING MAMMOGRAM FOR MALIGNANT NEOPLASM OF BREAST: Primary | ICD-10-CM

## 2019-12-30 ENCOUNTER — OFFICE VISIT (OUTPATIENT)
Dept: FAMILY MEDICINE CLINIC | Facility: CLINIC | Age: 39
End: 2019-12-30
Payer: COMMERCIAL

## 2019-12-30 VITALS — SYSTOLIC BLOOD PRESSURE: 110 MMHG | TEMPERATURE: 97.2 F | DIASTOLIC BLOOD PRESSURE: 72 MMHG

## 2019-12-30 DIAGNOSIS — J45.40 MODERATE PERSISTENT ASTHMA WITHOUT COMPLICATION: Primary | ICD-10-CM

## 2019-12-30 PROCEDURE — 99213 OFFICE O/P EST LOW 20 MIN: CPT | Performed by: FAMILY MEDICINE

## 2019-12-30 PROCEDURE — 1036F TOBACCO NON-USER: CPT | Performed by: FAMILY MEDICINE

## 2019-12-30 RX ORDER — MONTELUKAST SODIUM 10 MG/1
10 TABLET ORAL
Qty: 30 TABLET | Refills: 5 | Status: SHIPPED | OUTPATIENT
Start: 2019-12-30 | End: 2020-06-08 | Stop reason: SDUPTHER

## 2019-12-30 NOTE — PROGRESS NOTES
Assessment/Plan:             Diagnoses and all orders for this visit:    Moderate persistent asthma without complication  -     montelukast (SINGULAIR) 10 mg tablet; Take 1 tablet (10 mg total) by mouth daily at bedtime            Subjective:        Patient ID: Cinda Sky is a 37 y o  female  Patient presents with: Follow-up: Last seen 9/30/19  Here to discuss going back on allergy medications  Medication Refill: [facility] on file for today's visit  Asthma            The following portions of the patient's history were reviewed and updated as appropriate: allergies, current medications, past family history, past medical history, past social history, past surgical history and problem list       Review of Systems   Constitutional: Negative  HENT: Negative  Eyes: Negative  Respiratory: Positive for shortness of breath and wheezing  Cardiovascular: Negative  Gastrointestinal: Negative  Endocrine: Negative  Genitourinary: Negative  Musculoskeletal: Negative  Skin: Negative  Allergic/Immunologic: Negative  Neurological: Negative  Hematological: Negative  Psychiatric/Behavioral: Negative  All other systems reviewed and are negative  Objective:             /72 (BP Location: Left arm)   Temp (!) 97 2 °F (36 2 °C)          Physical Exam   Constitutional: She is oriented to person, place, and time  She appears well-developed and well-nourished  HENT:   Head: Normocephalic and atraumatic  Right Ear: External ear normal    Left Ear: External ear normal    Nose: Nose normal    Mouth/Throat: Oropharynx is clear and moist    Eyes: Pupils are equal, round, and reactive to light  Conjunctivae and EOM are normal    Neck: Normal range of motion  Neck supple  Cardiovascular: Normal rate, regular rhythm and normal heart sounds  Pulmonary/Chest: Effort normal and breath sounds normal    Abdominal: Soft   Bowel sounds are normal    Musculoskeletal: Normal range of motion  Neurological: She is alert and oriented to person, place, and time  She has normal reflexes  Skin: Skin is warm and dry  Psychiatric: She has a normal mood and affect  Her behavior is normal    Nursing note and vitals reviewed

## 2020-02-17 ENCOUNTER — TELEPHONE (OUTPATIENT)
Dept: FAMILY MEDICINE CLINIC | Facility: CLINIC | Age: 40
End: 2020-02-17

## 2020-02-17 NOTE — TELEPHONE ENCOUNTER
I should see her in the office first   It may be a simple problem such as wax in the ear  At the very least will be able to better decide on a proper referral if I see he in the office

## 2020-02-19 ENCOUNTER — HOSPITAL ENCOUNTER (OUTPATIENT)
Dept: MAMMOGRAPHY | Facility: MEDICAL CENTER | Age: 40
Discharge: HOME | End: 2020-02-19
Payer: COMMERCIAL

## 2020-02-19 VITALS — HEIGHT: 66 IN | WEIGHT: 195 LBS | BODY MASS INDEX: 31.34 KG/M2

## 2020-02-19 DIAGNOSIS — Z12.31 ENCOUNTER FOR SCREENING MAMMOGRAM FOR MALIGNANT NEOPLASM OF BREAST: ICD-10-CM

## 2020-02-19 PROCEDURE — 77067 SCR MAMMO BI INCL CAD: CPT

## 2020-02-19 PROCEDURE — 77063 BREAST TOMOSYNTHESIS BI: CPT

## 2020-02-20 ENCOUNTER — OFFICE VISIT (OUTPATIENT)
Dept: FAMILY MEDICINE CLINIC | Facility: CLINIC | Age: 40
End: 2020-02-20
Payer: COMMERCIAL

## 2020-02-20 VITALS
BODY MASS INDEX: 31.47 KG/M2 | OXYGEN SATURATION: 98 % | HEIGHT: 66 IN | HEART RATE: 61 BPM | SYSTOLIC BLOOD PRESSURE: 114 MMHG | DIASTOLIC BLOOD PRESSURE: 70 MMHG

## 2020-02-20 DIAGNOSIS — H65.01 NON-RECURRENT ACUTE SEROUS OTITIS MEDIA OF RIGHT EAR: ICD-10-CM

## 2020-02-20 DIAGNOSIS — H69.81 DYSFUNCTION OF RIGHT EUSTACHIAN TUBE: Primary | ICD-10-CM

## 2020-02-20 PROBLEM — H69.91 DYSFUNCTION OF RIGHT EUSTACHIAN TUBE: Status: ACTIVE | Noted: 2020-02-20

## 2020-02-20 PROCEDURE — 1036F TOBACCO NON-USER: CPT | Performed by: FAMILY MEDICINE

## 2020-02-20 PROCEDURE — 99213 OFFICE O/P EST LOW 20 MIN: CPT | Performed by: FAMILY MEDICINE

## 2020-02-20 RX ORDER — FLUTICASONE PROPIONATE 50 MCG
2 SPRAY, SUSPENSION (ML) NASAL DAILY
Qty: 1 BOTTLE | Refills: 3 | Status: SHIPPED | OUTPATIENT
Start: 2020-02-20 | End: 2020-07-08

## 2020-02-20 RX ORDER — CEFUROXIME AXETIL 500 MG/1
500 TABLET ORAL 2 TIMES DAILY
Qty: 20 TABLET | Refills: 0 | Status: SHIPPED | OUTPATIENT
Start: 2020-02-20 | End: 2020-03-01

## 2020-02-20 NOTE — PROGRESS NOTES
Assessment/Plan:    Believe her problem is eustachian tube dysfunction  If not better in 7-10 days will refer to ENT  Diagnoses and all orders for this visit:    Dysfunction of right eustachian tube  -     fluticasone (FLONASE) 50 mcg/act nasal spray; 2 sprays into each nostril daily    Non-recurrent acute serous otitis media of right ear  -     cefuroxime (CEFTIN) 500 mg tablet; Take 1 tablet (500 mg total) by mouth 2 (two) times a day for 10 days            Subjective:        Patient ID: Jennifer Butler is a 37 y o  female  Patient presents with:  Earache:     noticed r ear  all the time cant hear the same way no pain             The following portions of the patient's history were reviewed and updated as appropriate: allergies, current medications, past family history, past medical history, past social history, past surgical history and problem list       Review of Systems   Constitutional: Negative  HENT:        Decreased mimi right ear  Eyes: Negative  Respiratory: Negative  Cardiovascular: Negative  Gastrointestinal: Negative  Endocrine: Negative  Genitourinary: Negative  Musculoskeletal: Negative  Skin: Negative  Allergic/Immunologic: Negative  Neurological: Negative  Hematological: Negative  Psychiatric/Behavioral: Negative  All other systems reviewed and are negative  Objective:      BMI Counseling: Body mass index is 31 47 kg/m²  The BMI is above normal  Nutrition recommendations include decreasing portion sizes, encouraging healthy choices of fruits and vegetables, decreasing fast food intake, consuming healthier snacks, limiting drinks that contain sugar, moderation in carbohydrate intake, increasing intake of lean protein, reducing intake of saturated and trans fat and reducing intake of cholesterol  Exercise recommendations include moderate physical activity 150 minutes/week  No pharmacotherapy was ordered               /70 (BP Location: Right arm, Patient Position: Sitting, Cuff Size: Standard)   Pulse 61   Ht 5' 6" (1 676 m)   LMP 02/05/2020   SpO2 98%   BMI 31 47 kg/m²          Physical Exam   Constitutional: She is oriented to person, place, and time  She appears well-developed and well-nourished  HENT:   Head: Normocephalic and atraumatic  Right Ear: External ear normal  Tympanic membrane is retracted  A middle ear effusion is present  Decreased hearing is noted  Left Ear: External ear normal    Nose: Nose normal    Mouth/Throat: Oropharynx is clear and moist    Eyes: Pupils are equal, round, and reactive to light  Conjunctivae and EOM are normal    Neck: Normal range of motion  Neck supple  Cardiovascular: Normal rate, regular rhythm and normal heart sounds  Pulmonary/Chest: Effort normal and breath sounds normal    Abdominal: Soft  Bowel sounds are normal    Musculoskeletal: Normal range of motion  Neurological: She is alert and oriented to person, place, and time  She has normal reflexes  Skin: Skin is warm and dry  Psychiatric: She has a normal mood and affect  Her behavior is normal    Nursing note and vitals reviewed

## 2020-02-27 ENCOUNTER — HOSPITAL ENCOUNTER (OUTPATIENT)
Dept: MAMMOGRAPHY | Facility: CLINIC | Age: 40
Discharge: HOME | End: 2020-02-27
Payer: COMMERCIAL

## 2020-02-27 ENCOUNTER — HOSPITAL ENCOUNTER (OUTPATIENT)
Dept: ULTRASOUND IMAGING | Facility: CLINIC | Age: 40
Discharge: HOME | End: 2020-02-27
Payer: COMMERCIAL

## 2020-02-27 VITALS — WEIGHT: 195 LBS | HEIGHT: 66 IN | BODY MASS INDEX: 31.34 KG/M2

## 2020-02-27 DIAGNOSIS — R92.8 ABNORMAL MAMMOGRAM: ICD-10-CM

## 2020-02-27 PROCEDURE — 77065 DX MAMMO INCL CAD UNI: CPT

## 2020-02-27 PROCEDURE — 76642 ULTRASOUND BREAST LIMITED: CPT

## 2020-02-27 PROCEDURE — G0279 TOMOSYNTHESIS, MAMMO: HCPCS

## 2020-05-30 DIAGNOSIS — F32.A DEPRESSION, UNSPECIFIED DEPRESSION TYPE: ICD-10-CM

## 2020-05-31 RX ORDER — ESCITALOPRAM OXALATE 5 MG/1
TABLET ORAL
Qty: 30 TABLET | Refills: 5 | Status: SHIPPED | OUTPATIENT
Start: 2020-05-31 | End: 2020-06-08 | Stop reason: SDUPTHER

## 2020-06-02 ENCOUNTER — ANNUAL EXAM (OUTPATIENT)
Dept: OBGYN CLINIC | Facility: CLINIC | Age: 40
End: 2020-06-02
Payer: COMMERCIAL

## 2020-06-02 VITALS — WEIGHT: 209 LBS | BODY MASS INDEX: 33.73 KG/M2 | SYSTOLIC BLOOD PRESSURE: 120 MMHG | DIASTOLIC BLOOD PRESSURE: 64 MMHG

## 2020-06-02 DIAGNOSIS — Z12.31 ENCOUNTER FOR SCREENING MAMMOGRAM FOR MALIGNANT NEOPLASM OF BREAST: ICD-10-CM

## 2020-06-02 DIAGNOSIS — Z01.419 ENCNTR FOR GYN EXAM (GENERAL) (ROUTINE) W/O ABN FINDINGS: ICD-10-CM

## 2020-06-02 PROBLEM — Z00.00 ANNUAL PHYSICAL EXAM: Status: RESOLVED | Noted: 2019-09-30 | Resolved: 2020-06-02

## 2020-06-02 PROCEDURE — S0612 ANNUAL GYNECOLOGICAL EXAMINA: HCPCS | Performed by: PHYSICIAN ASSISTANT

## 2020-06-08 ENCOUNTER — OFFICE VISIT (OUTPATIENT)
Dept: FAMILY MEDICINE CLINIC | Facility: CLINIC | Age: 40
End: 2020-06-08
Payer: COMMERCIAL

## 2020-06-08 VITALS
BODY MASS INDEX: 33.75 KG/M2 | WEIGHT: 210 LBS | HEART RATE: 70 BPM | HEIGHT: 66 IN | SYSTOLIC BLOOD PRESSURE: 120 MMHG | DIASTOLIC BLOOD PRESSURE: 70 MMHG | TEMPERATURE: 96 F | OXYGEN SATURATION: 99 %

## 2020-06-08 DIAGNOSIS — F32.A DEPRESSION, UNSPECIFIED DEPRESSION TYPE: ICD-10-CM

## 2020-06-08 DIAGNOSIS — H69.81 DYSFUNCTION OF RIGHT EUSTACHIAN TUBE: ICD-10-CM

## 2020-06-08 DIAGNOSIS — J30.1 SEASONAL ALLERGIC RHINITIS DUE TO POLLEN: ICD-10-CM

## 2020-06-08 DIAGNOSIS — J40 BRONCHITIS: ICD-10-CM

## 2020-06-08 DIAGNOSIS — J45.40 MODERATE PERSISTENT ASTHMA WITHOUT COMPLICATION: Primary | ICD-10-CM

## 2020-06-08 PROCEDURE — 1036F TOBACCO NON-USER: CPT | Performed by: FAMILY MEDICINE

## 2020-06-08 PROCEDURE — 99214 OFFICE O/P EST MOD 30 MIN: CPT | Performed by: FAMILY MEDICINE

## 2020-06-08 PROCEDURE — 3008F BODY MASS INDEX DOCD: CPT | Performed by: FAMILY MEDICINE

## 2020-06-08 RX ORDER — ESCITALOPRAM OXALATE 5 MG/1
5 TABLET ORAL DAILY
Qty: 30 TABLET | Refills: 5 | Status: SHIPPED | OUTPATIENT
Start: 2020-06-08 | End: 2020-10-19 | Stop reason: SDUPTHER

## 2020-06-08 RX ORDER — MONTELUKAST SODIUM 10 MG/1
10 TABLET ORAL
Qty: 30 TABLET | Refills: 5 | Status: SHIPPED | OUTPATIENT
Start: 2020-06-08 | End: 2020-12-30

## 2020-06-08 RX ORDER — ALBUTEROL SULFATE 90 UG/1
2 AEROSOL, METERED RESPIRATORY (INHALATION) EVERY 6 HOURS PRN
Qty: 1 INHALER | Refills: 2 | Status: SHIPPED | OUTPATIENT
Start: 2020-06-08 | End: 2021-11-30 | Stop reason: SDUPTHER

## 2020-06-08 RX ORDER — METHYLPREDNISOLONE 4 MG/1
TABLET ORAL
Qty: 21 EACH | Refills: 0 | Status: SHIPPED | OUTPATIENT
Start: 2020-06-08 | End: 2020-07-08 | Stop reason: ALTCHOICE

## 2020-07-20 DIAGNOSIS — Z20.828 EXPOSURE TO SARS-ASSOCIATED CORONAVIRUS: ICD-10-CM

## 2020-07-20 PROCEDURE — U0003 INFECTIOUS AGENT DETECTION BY NUCLEIC ACID (DNA OR RNA); SEVERE ACUTE RESPIRATORY SYNDROME CORONAVIRUS 2 (SARS-COV-2) (CORONAVIRUS DISEASE [COVID-19]), AMPLIFIED PROBE TECHNIQUE, MAKING USE OF HIGH THROUGHPUT TECHNOLOGIES AS DESCRIBED BY CMS-2020-01-R: HCPCS

## 2020-07-24 LAB — SARS-COV-2 RNA SPEC QL NAA+PROBE: NOT DETECTED

## 2020-07-24 NOTE — RESULT ENCOUNTER NOTE
Please call Codey Coley and let her know that her COVID-19 swab was negative  I advised her to continue social distancing procedures

## 2020-09-07 ENCOUNTER — HOSPITAL ENCOUNTER (EMERGENCY)
Facility: HOSPITAL | Age: 40
Discharge: HOME | End: 2020-09-07
Attending: EMERGENCY MEDICINE | Admitting: EMERGENCY MEDICINE
Payer: COMMERCIAL

## 2020-09-07 ENCOUNTER — APPOINTMENT (EMERGENCY)
Dept: RADIOLOGY | Facility: HOSPITAL | Age: 40
End: 2020-09-07
Payer: COMMERCIAL

## 2020-09-07 VITALS
TEMPERATURE: 97.8 F | HEART RATE: 70 BPM | SYSTOLIC BLOOD PRESSURE: 110 MMHG | RESPIRATION RATE: 18 BRPM | OXYGEN SATURATION: 98 % | DIASTOLIC BLOOD PRESSURE: 67 MMHG

## 2020-09-07 DIAGNOSIS — S93.402A LEFT ANKLE SPRAIN: Primary | ICD-10-CM

## 2020-09-07 PROCEDURE — 99283 EMERGENCY DEPT VISIT LOW MDM: CPT

## 2020-09-07 PROCEDURE — 99284 EMERGENCY DEPT VISIT MOD MDM: CPT | Performed by: PHYSICIAN ASSISTANT

## 2020-09-07 PROCEDURE — 73610 X-RAY EXAM OF ANKLE: CPT

## 2020-09-07 RX ORDER — HYDROCODONE BITARTRATE AND ACETAMINOPHEN 5; 325 MG/1; MG/1
1 TABLET ORAL EVERY 6 HOURS PRN
Qty: 6 TABLET | Refills: 0 | Status: SHIPPED | OUTPATIENT
Start: 2020-09-07 | End: 2021-06-09 | Stop reason: ALTCHOICE

## 2020-09-07 NOTE — ED PROVIDER NOTES
History  Chief Complaint   Patient presents with    Ankle Injury     Reports accidently falling off of step and rolling left ankle  Denies taking anything for pain PTA  Patient is a 40-year-old female, presents emergency department for evaluation of left ankle injury  Patient states prior to arrival she was walking, when she accidentally inverted her left ankle  Patient states she was unable ambulate after injury  Patient sustained abrasion to left lateral malleolus, has developed worsening pain and swelling over left lateral malleolus  Patient reports prior injury to this ankle  Patient has not taken any medication for pain prior to arrival   Patient denies fever, foot pain, knee pain, hip pain, head injury, loss of consciousness  Prior to Admission Medications   Prescriptions Last Dose Informant Patient Reported? Taking?    albuterol (Ventolin HFA) 90 mcg/act inhaler   No Yes   Sig: Inhale 2 puffs every 6 (six) hours as needed for wheezing or shortness of breath   camphor-menthol (SARNA) lotion   No Yes   Sig: Apply topically as needed for itching   escitalopram (LEXAPRO) 5 mg tablet   No Yes   Sig: Take 1 tablet (5 mg total) by mouth daily   montelukast (SINGULAIR) 10 mg tablet   No Yes   Sig: Take 1 tablet (10 mg total) by mouth daily at bedtime      Facility-Administered Medications: None       Past Medical History:   Diagnosis Date    Allergic rhinitis     Asthma     Complex ovarian cyst     last assessed: 2014    Condyloma acuminatum     Depression     Dysmenorrhea     last assessed: 2013    H/O human papillomavirus infection     Migraine     Palpitations     last assessed: 2013    TMJ dysfunction        Past Surgical History:   Procedure Laterality Date    CERVICAL BIOPSY  W/ LOOP ELECTRODE EXCISION       SECTION      low transverse    COSMETIC SURGERY      Breast reduction    KNEE SURGERY      REDUCTION MAMMAPLASTY  2008    ROTATOR CUFF REPAIR 11/28/2014    TUBAL LIGATION         Family History   Problem Relation Age of Onset   [address] [facility] Breast cancer Mother 37    Cancer Father         systemic amylodosis    Breast cancer Paternal Grandmother 43    Breast cancer Paternal Aunt 46    Cancer Maternal Grandfather         renal    No Known Problems Daughter     No Known Problems Paternal Aunt      I have reviewed and agree with the history as documented  E-Cigarette/Vaping    E-Cigarette Use Never User      E-Cigarette/Vaping Substances    Nicotine No     THC No     CBD No     Flavoring No     Other No     Unknown No      Social History     Tobacco Use    Smoking status: Former Smoker    Smokeless tobacco: Never Used    Tobacco comment: former smoker (as per allscripts)   Substance Use Topics    Alcohol use: No    Drug use: No       Review of Systems   Musculoskeletal:        Ankle injury   All other systems reviewed and are negative  Physical Exam  Physical Exam  Constitutional:       General: She is not in acute distress  Appearance: She is well-developed  She is not ill-appearing or toxic-appearing  HENT:      Head: Normocephalic and atraumatic  Nose: Nose normal    Eyes:      Conjunctiva/sclera: Conjunctivae normal    Neck:      Musculoskeletal: Normal range of motion  Cardiovascular:      Rate and Rhythm: Normal rate and regular rhythm  Pulmonary:      Effort: Pulmonary effort is normal       Breath sounds: Normal breath sounds  No decreased breath sounds, wheezing, rhonchi or rales  Musculoskeletal:      Left knee: Normal       Left ankle: She exhibits decreased range of motion, swelling and ecchymosis  She exhibits no deformity  Tenderness  Lateral malleolus and medial malleolus tenderness found  No AITFL, no CF ligament, no posterior TFL, no head of 5th metatarsal and no proximal fibula tenderness found  Achilles tendon normal       Left foot: Normal    Skin:     General: Skin is warm and dry        Capillary Refill: Capillary refill takes less than 2 seconds  Neurological:      Mental Status: She is alert and oriented to person, place, and time  Psychiatric:         Mood and Affect: Mood normal          Behavior: Behavior normal          Vital Signs  ED Triage Vitals [09/07/20 1755]   Temperature Pulse Respirations Blood Pressure SpO2   97 8 °F (36 6 °C) 70 18 110/67 98 %      Temp Source Heart Rate Source Patient Position - Orthostatic VS BP Location FiO2 (%)   Temporal Monitor Sitting Right arm --      Pain Score       Worst Possible Pain           Vitals:    09/07/20 1755   BP: 110/67   Pulse: 70   Patient Position - Orthostatic VS: Sitting         Visual Acuity      ED Medications  Medications - No data to display    Diagnostic Studies  Results Reviewed     None                 XR ankle 3+ views LEFT   ED Interpretation by Ronnell Callaway PA-C (09/07 1909)   No acute osseous abnormality seen by me                 Procedures  Procedures         ED Course                                             MDM  Number of Diagnoses or Management Options  Left ankle sprain: new and does not require workup  Diagnosis management comments: Patient is a 68-year-old female, presents emergency department for evaluation of left ankle injury  Patient states prior to arrival she was walking, when she accidentally inverted her left ankle  Patient states she was unable ambulate after injury  Patient sustained abrasion to left lateral malleolus, has developed worsening pain and swelling over left lateral malleolus  Patient reports prior injury to this ankle  X-ray left ankle shows no acute osseous abnormality seen by me, however the film will be reviewed by a radiologist   I informed the patient of this and if there is any discrepancy, the patient will be contacted  Crutches and Aircast provided in emergency department  Information for orthopedics provided to patient encouraged to follow-up if symptoms do not improve    Encouraged patient to use ice, elevation, compression and take Motrin/Tylenol for pain reduction  Rx for small quantity of Norco provided to patient to take at night for pain relief per patient request  I reviewed this patient through the [facility] portal and did not find any evidence of narcotic abuse or doctor shopping  Patient verbalizes understanding and agrees with plan  The management plan was discussed in detail with the patient at bedside and all questions were answered  Prior to discharge, I provided both verbal and written instructions  I discussed with the patient the signs and symptoms for which to return to the emergency department  All questions were answered and patient was comfortable with the plan of care and discharged to home  The patient agrees to return to the Emergency Department for concerns and/or progression of illness  Disposition  Final diagnoses:   Left ankle sprain     Time reflects when diagnosis was documented in both MDM as applicable and the Disposition within this note     Time User Action Codes Description Comment    9/7/2020  7:05 PM Aakash Whipple Add [[id number]] Left ankle sprain       ED Disposition     ED Disposition Condition Date/Time Comment    Discharge Stable Mon Sep 7, 2020  7:06 PM [facility] discharge to home/self care              Follow-up Information     Follow up With Specialties Details Why Contact Info Additional [address] [facility] Schedule an appointment as soon as possible for a visit  If symptoms worsen [address] [facility] [address] [facility] [id number]  [address] [place] [address], [address] [place] [address], [phone]          Patient's Medications   Discharge Prescriptions    HYDROCODONE-ACETAMINOPHEN (NORCO) 5-325 MG PER TABLET    Take 1 tablet by mouth every 6 (six) hours as needed for painMax Daily Amount: 4 tablets       Start Date: 9/7/2020  End Date: --       Order Dose: 1 tablet       Quantity: 6 tablet    Refills: 0     No discharge procedures on file      PDMP Review       Value Time User    PDMP Reviewed  Yes 9/7/2020  7:05 PM Vandana Trujillo PA-C          ED Provider  Electronically Signed by           Vandana Trujillo PA-C  09/07/20 5761

## 2020-09-21 ENCOUNTER — TELEPHONE (OUTPATIENT)
Dept: OBGYN CLINIC | Facility: CLINIC | Age: 40
End: 2020-09-21

## 2020-09-21 DIAGNOSIS — R10.2 PELVIC PAIN: Primary | ICD-10-CM

## 2020-09-22 ENCOUNTER — HOSPITAL ENCOUNTER (OUTPATIENT)
Dept: ULTRASOUND IMAGING | Facility: HOSPITAL | Age: 40
Discharge: HOME | End: 2020-09-22
Payer: COMMERCIAL

## 2020-09-22 DIAGNOSIS — R10.2 PELVIC PAIN: ICD-10-CM

## 2020-09-22 PROCEDURE — 76830 TRANSVAGINAL US NON-OB: CPT

## 2020-09-22 PROCEDURE — 76856 US EXAM PELVIC COMPLETE: CPT

## 2020-09-25 ENCOUNTER — TELEPHONE (OUTPATIENT)
Dept: OBGYN CLINIC | Facility: CLINIC | Age: 40
End: 2020-09-25

## 2020-09-25 NOTE — TELEPHONE ENCOUNTER
I cannot see a GYN cause for her pain  If she has continued pain, would have her f/u with her PCP  Thanks!

## 2020-09-25 NOTE — TELEPHONE ENCOUNTER
----- Message from Onfido KAREN Vigil sent at 9/23/2020  5:08 PM EDT -----  Please let Masoud Park know that her pelvic ultrasound is normal, no signs of a cyst  Is she still having pain?

## 2020-10-19 ENCOUNTER — OFFICE VISIT (OUTPATIENT)
Dept: FAMILY MEDICINE CLINIC | Facility: CLINIC | Age: 40
End: 2020-10-19
Payer: COMMERCIAL

## 2020-10-19 VITALS — DIASTOLIC BLOOD PRESSURE: 76 MMHG | HEIGHT: 66 IN | BODY MASS INDEX: 33.89 KG/M2 | SYSTOLIC BLOOD PRESSURE: 106 MMHG

## 2020-10-19 DIAGNOSIS — J45.40 MODERATE PERSISTENT ASTHMA WITHOUT COMPLICATION: ICD-10-CM

## 2020-10-19 DIAGNOSIS — F33.1 MODERATE EPISODE OF RECURRENT MAJOR DEPRESSIVE DISORDER (HCC): Primary | ICD-10-CM

## 2020-10-19 DIAGNOSIS — Z00.00 WELL ADULT EXAM: ICD-10-CM

## 2020-10-19 DIAGNOSIS — F32.A DEPRESSION, UNSPECIFIED DEPRESSION TYPE: ICD-10-CM

## 2020-10-19 DIAGNOSIS — M19.90 ARTHRITIS: ICD-10-CM

## 2020-10-19 PROCEDURE — 3725F SCREEN DEPRESSION PERFORMED: CPT | Performed by: FAMILY MEDICINE

## 2020-10-19 PROCEDURE — 99214 OFFICE O/P EST MOD 30 MIN: CPT | Performed by: FAMILY MEDICINE

## 2020-10-19 PROCEDURE — 1036F TOBACCO NON-USER: CPT | Performed by: FAMILY MEDICINE

## 2020-10-19 RX ORDER — CETIRIZINE HYDROCHLORIDE 5 MG/1
10 TABLET ORAL DAILY
COMMUNITY

## 2020-10-19 RX ORDER — ESCITALOPRAM OXALATE 10 MG/1
10 TABLET ORAL DAILY
Qty: 30 TABLET | Refills: 5 | Status: SHIPPED | OUTPATIENT
Start: 2020-10-19 | End: 2021-04-15

## 2020-11-06 ENCOUNTER — LAB (OUTPATIENT)
Dept: LAB | Facility: HOSPITAL | Age: 40
End: 2020-11-06
Payer: COMMERCIAL

## 2020-11-06 LAB
ALBUMIN SERPL BCP-MCNC: 4.1 G/DL (ref 3–5.2)
ALP SERPL-CCNC: 49 U/L (ref 43–122)
ALT SERPL W P-5'-P-CCNC: 19 U/L (ref 9–52)
ANION GAP SERPL CALCULATED.3IONS-SCNC: 4 MMOL/L (ref 5–14)
AST SERPL W P-5'-P-CCNC: 20 U/L (ref 14–36)
BASOPHILS # BLD AUTO: 0 THOUSANDS/ΜL (ref 0–0.1)
BASOPHILS NFR BLD AUTO: 0 % (ref 0–1)
BILIRUB SERPL-MCNC: 0.4 MG/DL
BUN SERPL-MCNC: 15 MG/DL (ref 5–25)
CALCIUM SERPL-MCNC: 9 MG/DL (ref 8.4–10.2)
CHLORIDE SERPL-SCNC: 106 MMOL/L (ref 97–108)
CHOLEST SERPL-MCNC: 153 MG/DL
CO2 SERPL-SCNC: 29 MMOL/L (ref 22–30)
CREAT SERPL-MCNC: 0.71 MG/DL (ref 0.6–1.2)
EOSINOPHIL # BLD AUTO: 0.2 THOUSAND/ΜL (ref 0–0.4)
EOSINOPHIL NFR BLD AUTO: 3 % (ref 0–6)
ERYTHROCYTE [DISTWIDTH] IN BLOOD BY AUTOMATED COUNT: 13 %
GFR SERPL CREATININE-BSD FRML MDRD: 105 ML/MIN/1.73SQ M
GLUCOSE P FAST SERPL-MCNC: 90 MG/DL (ref 70–99)
HCT VFR BLD AUTO: 39.3 % (ref 36–46)
HDLC SERPL-MCNC: 39 MG/DL
HGB BLD-MCNC: 13.3 G/DL (ref 12–16)
LDLC SERPL CALC-MCNC: 74 MG/DL
LYMPHOCYTES # BLD AUTO: 1.3 THOUSANDS/ΜL (ref 0.5–4)
LYMPHOCYTES NFR BLD AUTO: 26 % (ref 25–45)
MCH RBC QN AUTO: 30.8 PG (ref 26–34)
MCHC RBC AUTO-ENTMCNC: 33.9 G/DL (ref 31–36)
MCV RBC AUTO: 91 FL (ref 80–100)
MONOCYTES # BLD AUTO: 0.4 THOUSAND/ΜL (ref 0.2–0.9)
MONOCYTES NFR BLD AUTO: 7 % (ref 1–10)
NEUTROPHILS # BLD AUTO: 3.3 THOUSANDS/ΜL (ref 1.8–7.8)
NEUTS SEG NFR BLD AUTO: 64 % (ref 45–65)
NONHDLC SERPL-MCNC: 114 MG/DL
PLATELET # BLD AUTO: 201 THOUSANDS/UL (ref 150–450)
PMV BLD AUTO: 8.8 FL (ref 8.9–12.7)
POTASSIUM SERPL-SCNC: 4.6 MMOL/L (ref 3.6–5)
PROT SERPL-MCNC: 7.3 G/DL (ref 5.9–8.4)
RBC # BLD AUTO: 4.32 MILLION/UL (ref 4–5.2)
SODIUM SERPL-SCNC: 139 MMOL/L (ref 137–147)
TRIGL SERPL-MCNC: 198 MG/DL
TSH SERPL DL<=0.05 MIU/L-ACNC: 1.16 UIU/ML (ref 0.47–4.68)
WBC # BLD AUTO: 5.2 THOUSAND/UL (ref 4.5–11)

## 2020-11-06 PROCEDURE — 80053 COMPREHEN METABOLIC PANEL: CPT | Performed by: FAMILY MEDICINE

## 2020-11-06 PROCEDURE — 80061 LIPID PANEL: CPT | Performed by: FAMILY MEDICINE

## 2020-11-06 PROCEDURE — 85025 COMPLETE CBC W/AUTO DIFF WBC: CPT | Performed by: FAMILY MEDICINE

## 2020-11-06 PROCEDURE — 84443 ASSAY THYROID STIM HORMONE: CPT | Performed by: FAMILY MEDICINE

## 2020-11-06 PROCEDURE — 36415 COLL VENOUS BLD VENIPUNCTURE: CPT | Performed by: FAMILY MEDICINE

## 2020-11-09 ENCOUNTER — OFFICE VISIT (OUTPATIENT)
Dept: FAMILY MEDICINE CLINIC | Facility: CLINIC | Age: 40
End: 2020-11-09
Payer: COMMERCIAL

## 2020-11-09 VITALS
DIASTOLIC BLOOD PRESSURE: 64 MMHG | HEIGHT: 66 IN | WEIGHT: 215 LBS | SYSTOLIC BLOOD PRESSURE: 100 MMHG | BODY MASS INDEX: 34.55 KG/M2 | TEMPERATURE: 96.4 F

## 2020-11-09 DIAGNOSIS — F33.1 MODERATE EPISODE OF RECURRENT MAJOR DEPRESSIVE DISORDER (HCC): ICD-10-CM

## 2020-11-09 DIAGNOSIS — Z00.00 ANNUAL PHYSICAL EXAM: Primary | ICD-10-CM

## 2020-11-09 PROBLEM — J40 BRONCHITIS: Status: RESOLVED | Noted: 2019-12-03 | Resolved: 2020-11-09

## 2020-11-09 PROCEDURE — 99396 PREV VISIT EST AGE 40-64: CPT | Performed by: FAMILY MEDICINE

## 2020-11-09 PROCEDURE — 1036F TOBACCO NON-USER: CPT | Performed by: FAMILY MEDICINE

## 2020-11-09 PROCEDURE — 3725F SCREEN DEPRESSION PERFORMED: CPT | Performed by: FAMILY MEDICINE

## 2020-11-09 PROCEDURE — 99214 OFFICE O/P EST MOD 30 MIN: CPT | Performed by: FAMILY MEDICINE

## 2020-11-09 PROCEDURE — 3008F BODY MASS INDEX DOCD: CPT | Performed by: FAMILY MEDICINE

## 2020-12-30 DIAGNOSIS — J45.40 MODERATE PERSISTENT ASTHMA WITHOUT COMPLICATION: ICD-10-CM

## 2020-12-30 DIAGNOSIS — F32.A DEPRESSION, UNSPECIFIED DEPRESSION TYPE: ICD-10-CM

## 2020-12-30 RX ORDER — ESCITALOPRAM OXALATE 5 MG/1
TABLET ORAL
Qty: 30 TABLET | Refills: 5 | Status: SHIPPED | OUTPATIENT
Start: 2020-12-30 | End: 2021-06-09 | Stop reason: ALTCHOICE

## 2020-12-30 RX ORDER — MONTELUKAST SODIUM 10 MG/1
TABLET ORAL
Qty: 30 TABLET | Refills: 5 | Status: SHIPPED | OUTPATIENT
Start: 2020-12-30 | End: 2021-06-25

## 2021-01-22 ENCOUNTER — TELEPHONE (OUTPATIENT)
Dept: FAMILY MEDICINE CLINIC | Facility: CLINIC | Age: 41
End: 2021-01-22

## 2021-03-10 DIAGNOSIS — Z23 ENCOUNTER FOR IMMUNIZATION: ICD-10-CM

## 2021-03-25 ENCOUNTER — OFFICE VISIT (OUTPATIENT)
Dept: FAMILY MEDICINE CLINIC | Facility: CLINIC | Age: 41
End: 2021-03-25
Payer: COMMERCIAL

## 2021-03-25 VITALS
DIASTOLIC BLOOD PRESSURE: 80 MMHG | SYSTOLIC BLOOD PRESSURE: 118 MMHG | HEIGHT: 65 IN | BODY MASS INDEX: 38.49 KG/M2 | TEMPERATURE: 97.4 F | WEIGHT: 231 LBS

## 2021-03-25 DIAGNOSIS — E04.9 ENLARGED THYROID GLAND: Primary | ICD-10-CM

## 2021-03-25 PROCEDURE — 99214 OFFICE O/P EST MOD 30 MIN: CPT | Performed by: FAMILY MEDICINE

## 2021-03-25 PROCEDURE — 3008F BODY MASS INDEX DOCD: CPT | Performed by: FAMILY MEDICINE

## 2021-03-25 PROCEDURE — 1036F TOBACCO NON-USER: CPT | Performed by: FAMILY MEDICINE

## 2021-03-25 RX ORDER — IBUPROFEN 600 MG/1
600 TABLET ORAL EVERY 8 HOURS PRN
COMMUNITY
Start: 2021-01-25 | End: 2022-02-07

## 2021-03-25 NOTE — PROGRESS NOTES
Assessment/Plan:      We discussed therapy options  Sided to start with an ultrasound of the soft tissues of the neck including thyroid  We will follow-up after that  Consider referral to ENT if necessary  Diagnoses and all orders for this visit:    Enlarged thyroid gland  -     US head neck soft tissue; Future    Other orders  -     ibuprofen (MOTRIN) 600 mg tablet; Take 600 mg by mouth every 8 (eight) hours as needed          Subjective:     Patient ID: Remigio Park is a 41 y o  female  Complains of feeling a pressure in her throat area  She describes it is feeling something pressing on her esophagus  Not something sticking in her throat  She did have some lab work done in November the thyroid was normal at that time  Review of Systems   Constitutional: Negative  HENT: Negative  As noted in HPI   Eyes: Negative  Respiratory: Negative  Cardiovascular: Negative  Gastrointestinal: Negative  Endocrine: Negative  Genitourinary: Negative  Musculoskeletal: Negative  Skin: Negative  Allergic/Immunologic: Negative  Neurological: Negative  Hematological: Negative  Psychiatric/Behavioral: Negative  All other systems reviewed and are negative  Objective:     Physical Exam  Vitals signs and nursing note reviewed  Constitutional:       Appearance: She is well-developed  HENT:      Head: Normocephalic and atraumatic  Right Ear: External ear normal       Left Ear: External ear normal       Nose: Nose normal    Eyes:      Conjunctiva/sclera: Conjunctivae normal       Pupils: Pupils are equal, round, and reactive to light  Neck:      Musculoskeletal: Normal range of motion and neck supple  No neck rigidity or muscular tenderness  Thyroid: Thyromegaly present  No thyroid mass or thyroid tenderness  Vascular: No carotid bruit  Trachea: No tracheal tenderness or tracheal deviation     Cardiovascular:      Rate and Rhythm: Normal rate and regular rhythm  Heart sounds: Normal heart sounds  Pulmonary:      Effort: Pulmonary effort is normal       Breath sounds: Normal breath sounds  Abdominal:      General: Bowel sounds are normal       Palpations: Abdomen is soft  Musculoskeletal: Normal range of motion  Lymphadenopathy:      Cervical: No cervical adenopathy  Skin:     General: Skin is warm and dry  Neurological:      Mental Status: She is alert and oriented to person, place, and time  Deep Tendon Reflexes: Reflexes are normal and symmetric     Psychiatric:         Behavior: Behavior normal

## 2021-03-30 ENCOUNTER — HOSPITAL ENCOUNTER (OUTPATIENT)
Dept: ULTRASOUND IMAGING | Facility: MEDICAL CENTER | Age: 41
Discharge: HOME | End: 2021-03-30
Payer: COMMERCIAL

## 2021-03-30 DIAGNOSIS — E04.9 ENLARGED THYROID GLAND: ICD-10-CM

## 2021-03-30 PROCEDURE — 76536 US EXAM OF HEAD AND NECK: CPT

## 2021-04-15 DIAGNOSIS — F32.A DEPRESSION, UNSPECIFIED DEPRESSION TYPE: ICD-10-CM

## 2021-04-15 RX ORDER — ESCITALOPRAM OXALATE 10 MG/1
TABLET ORAL
Qty: 30 TABLET | Refills: 5 | Status: SHIPPED | OUTPATIENT
Start: 2021-04-15 | End: 2021-11-05

## 2021-05-04 ENCOUNTER — RA CDI HCC (OUTPATIENT)
Dept: OTHER | Facility: HOSPITAL | Age: 41
End: 2021-05-04

## 2021-05-04 NOTE — PROGRESS NOTES
[place] [address]  coding opportunities             Chart reviewed, (number of) suggestions sent to provider: 1     Problem listed updated  Provider Accepted, (number of) suggestions accepted: 1        Patients insurance company: [employer] (Medicare Advantage and Commercial)     Visit status: Patient canceled the appointment        [place] [address]  coding opportunities             Chart reviewed, (number of) suggestions sent to provider: 1     Problem listed updated   Provider Accepted, (number of) suggestions accepted: 1        Patients insurance company: [employer] (Medicare Advantage and Commercial)           [place] [address]  coding opportunities             Chart reviewed, (number of) suggestions sent to provider: 1           Patients insurance company: [employer] [address] [place] [address] (Medicare Advantage and Commercial)             Please refer to office visit note dated 11/9/20 as  Depression is further classified to:  F33 1 Moderate episode of recurrent major depressive disorder ([place] [address] )

## 2021-05-06 PROBLEM — F33.1 MODERATE EPISODE OF RECURRENT MAJOR DEPRESSIVE DISORDER (HCC): Status: ACTIVE | Noted: 2018-05-14

## 2021-05-07 ENCOUNTER — HOSPITAL ENCOUNTER (OUTPATIENT)
Dept: MAMMOGRAPHY | Facility: MEDICAL CENTER | Age: 41
Discharge: HOME | End: 2021-05-07
Payer: COMMERCIAL

## 2021-05-07 VITALS — BODY MASS INDEX: 38.49 KG/M2 | WEIGHT: 231 LBS | HEIGHT: 65 IN

## 2021-05-07 DIAGNOSIS — Z12.31 ENCOUNTER FOR SCREENING MAMMOGRAM FOR MALIGNANT NEOPLASM OF BREAST: ICD-10-CM

## 2021-05-07 PROCEDURE — 77067 SCR MAMMO BI INCL CAD: CPT

## 2021-05-07 PROCEDURE — 77063 BREAST TOMOSYNTHESIS BI: CPT

## 2021-06-11 ENCOUNTER — ANNUAL EXAM (OUTPATIENT)
Dept: OBGYN CLINIC | Facility: CLINIC | Age: 41
End: 2021-06-11
Payer: COMMERCIAL

## 2021-06-11 VITALS — DIASTOLIC BLOOD PRESSURE: 64 MMHG | SYSTOLIC BLOOD PRESSURE: 118 MMHG

## 2021-06-11 DIAGNOSIS — Z01.419 ENCNTR FOR GYN EXAM (GENERAL) (ROUTINE) W/O ABN FINDINGS: ICD-10-CM

## 2021-06-11 DIAGNOSIS — N90.89 VULVAR SKIN TAG: Primary | ICD-10-CM

## 2021-06-11 DIAGNOSIS — Z12.31 ENCOUNTER FOR SCREENING MAMMOGRAM FOR MALIGNANT NEOPLASM OF BREAST: ICD-10-CM

## 2021-06-11 PROBLEM — H69.91 DYSFUNCTION OF RIGHT EUSTACHIAN TUBE: Status: RESOLVED | Noted: 2020-02-20 | Resolved: 2021-06-11

## 2021-06-11 PROBLEM — L30.9 DERMATITIS: Status: RESOLVED | Noted: 2019-10-15 | Resolved: 2021-06-11

## 2021-06-11 PROBLEM — S16.1XXA CERVICAL STRAIN: Status: RESOLVED | Noted: 2018-03-01 | Resolved: 2021-06-11

## 2021-06-11 PROBLEM — H69.81 DYSFUNCTION OF RIGHT EUSTACHIAN TUBE: Status: RESOLVED | Noted: 2020-02-20 | Resolved: 2021-06-11

## 2021-06-11 PROBLEM — S46.812A STRAIN OF LEFT TRAPEZIUS MUSCLE: Status: RESOLVED | Noted: 2018-03-01 | Resolved: 2021-06-11

## 2021-06-11 PROBLEM — H65.01 NON-RECURRENT ACUTE SEROUS OTITIS MEDIA OF RIGHT EAR: Status: RESOLVED | Noted: 2020-02-20 | Resolved: 2021-06-11

## 2021-06-11 PROCEDURE — 56501 DESTROY VULVA LESIONS SIM: CPT | Performed by: PHYSICIAN ASSISTANT

## 2021-06-11 PROCEDURE — S0612 ANNUAL GYNECOLOGICAL EXAMINA: HCPCS | Performed by: PHYSICIAN ASSISTANT

## 2021-06-11 NOTE — PROGRESS NOTES
[facility]  1980      CC:  Yearly exam    S:  35 y o  female here for yearly exam  Her cycles are regular, not heavy or crampy  She is still getting night sweats; we discussed Remifemin and [employer]  She notes a bump at the posterior introitus that could be a wart or skin tag  It does not bother her other than its presence  Sexual activity: She is sexually active without pain, bleeding or dryness  Contraception: She uses tubal ligation for contraception  Her daughter just graduated from   Last Pap 3/15/19 neg/neg  Last Mammo 5/7/21 neg    We reviewed ASCCP guidelines for Pap testing today       Family hx of breast cancer: mother (44), PGM (43), paternal aunt (48)  Family hx of ovarian cancer: no  Family hx of colon cancer: no      Current Outpatient Medications:     albuterol (Ventolin HFA) 90 mcg/act inhaler, Inhale 2 puffs every 6 (six) hours as needed for wheezing or shortness of breath, Disp: 1 Inhaler, Rfl: 2    camphor-menthol (SARNA) lotion, Apply topically as needed for itching, Disp: 222 mL, Rfl: 0    cetirizine (ZyrTEC) 5 MG tablet, Take 10 mg by mouth daily, Disp: , Rfl:     escitalopram (LEXAPRO) 10 mg tablet, take 1 tablet by mouth once daily, Disp: 30 tablet, Rfl: 5    ibuprofen (MOTRIN) 600 mg tablet, Take 600 mg by mouth every 8 (eight) hours as needed, Disp: , Rfl:     montelukast (SINGULAIR) 10 mg tablet, take 1 tablet by mouth at bedtime, Disp: 30 tablet, Rfl: 5  Social History     Socioeconomic History    Marital status: /Civil Union     Spouse name: Not on file    Number of children: Not on file    Years of education: Not on file    Highest education level: Not on file   Occupational History    Not on file   Social Needs    Financial resource strain: Not on file    Food insecurity     Worry: Not on file     Inability: Not on file    Transportation needs     Medical: Not on file     Non-medical: Not on file   Tobacco Use    Smoking status: Former Smoker    Smokeless tobacco: Never Used    Tobacco comment: former smoker (as per allscripts)   Substance and Sexual Activity    Alcohol use: No    Drug use: No    Sexual activity: Yes     Partners: Male     Birth control/protection: Female Sterilization   Lifestyle    Physical activity     Days per week: Not on file     Minutes per session: Not on file    Stress: Not on file   Relationships    Social connections     Talks on phone: Not on file     Gets together: Not on file     Attends Rastafarian service: Not on file     Active member of club or organization: Not on file     Attends meetings of clubs or organizations: Not on file     Relationship status: Not on file    Intimate partner violence     Fear of current or ex partner: Not on file     Emotionally abused: Not on file     Physically abused: Not on file     Forced sexual activity: Not on file   Other Topics Concern    Not on file   Social History Narrative    Always uses seat belt    Denied: history of daily caffeinated coffee consumption    Exercises strenuously less than 3 times a week     Family History   Problem Relation Age of Onset    Breast cancer Mother 37    Cancer Father         systemic amylodosis    Breast cancer Paternal Grandmother 43    Breast cancer Paternal Aunt 46    Cancer Maternal Grandfather         renal    No Known Problems Daughter     No Known Problems Paternal Aunt       Past Medical History:   Diagnosis Date    Allergic rhinitis     Asthma     Complex ovarian cyst     last assessed: 7/9/2014    Condyloma acuminatum     Depression     Dysmenorrhea     last assessed: 9/20/2013    H/O human papillomavirus infection     History of ankle surgery     Migraine     Palpitations     last assessed: 12/20/2013    TMJ dysfunction         Review of Systems   Respiratory: Negative  Cardiovascular: Negative  Gastrointestinal: Negative for constipation and diarrhea     Genitourinary: Negative for difficulty urinating, pelvic pain, vaginal bleeding, vaginal discharge, itching or odor  O:  Blood pressure 118/64, last menstrual period 06/03/2021, not currently breastfeeding  Patient appears well and is not in distress  Neck is supple without masses  Breasts are symmetrical without mass, tenderness, nipple discharge, skin changes or adenopathy  Abdomen is soft and nontender without masses  External genitals are normal without lesions or rashes  Tiny 1-2mm condyloma vs skin tag at 7 o'clock just at the introitus - treated with TCA at her request  No complications, tolerated well  Urethral meatus and urethra are normal  Bladder is normal to palpation  Vagina is normal without discharge or bleeding  Cervix is normal without discharge or lesion  Uterus is normal, mobile, nontender without palpable mass  Adnexa are normal, nontender, without palpable mass  A:   Yearly exam     Skin tag vs  condyloma    P:   Pap 2024     Mammo slip provided    Return if condyloma/skin tag not resolved in 2 weeks     RTO one year for yearly exam or sooner as needed

## 2021-06-25 DIAGNOSIS — J45.40 MODERATE PERSISTENT ASTHMA WITHOUT COMPLICATION: ICD-10-CM

## 2021-06-25 RX ORDER — MONTELUKAST SODIUM 10 MG/1
TABLET ORAL
Qty: 30 TABLET | Refills: 5 | Status: SHIPPED | OUTPATIENT
Start: 2021-06-25 | End: 2021-11-30 | Stop reason: SDUPTHER

## 2021-11-05 DIAGNOSIS — D18.03 HEMANGIOMA OF LIVER: ICD-10-CM

## 2021-11-05 DIAGNOSIS — E04.9 ENLARGED THYROID GLAND: ICD-10-CM

## 2021-11-05 DIAGNOSIS — Z00.00 ANNUAL PHYSICAL EXAM: Primary | ICD-10-CM

## 2021-11-05 DIAGNOSIS — I95.1 ORTHOSTATIC HYPOTENSION: ICD-10-CM

## 2021-11-05 DIAGNOSIS — I10 HYPERTENSION, UNSPECIFIED TYPE: ICD-10-CM

## 2021-11-22 ENCOUNTER — RA CDI HCC (OUTPATIENT)
Dept: OTHER | Facility: HOSPITAL | Age: 41
End: 2021-11-22

## 2021-11-26 ENCOUNTER — APPOINTMENT (OUTPATIENT)
Dept: LAB | Facility: MEDICAL CENTER | Age: 41
End: 2021-11-26
Payer: COMMERCIAL

## 2021-11-26 DIAGNOSIS — Z00.00 ANNUAL PHYSICAL EXAM: ICD-10-CM

## 2021-11-26 DIAGNOSIS — E04.9 ENLARGED THYROID GLAND: ICD-10-CM

## 2021-11-26 DIAGNOSIS — D18.03 HEMANGIOMA OF LIVER: ICD-10-CM

## 2021-11-26 DIAGNOSIS — I10 HYPERTENSION, UNSPECIFIED TYPE: ICD-10-CM

## 2021-11-26 DIAGNOSIS — E03.9 HYPOTHYROIDISM, UNSPECIFIED TYPE: ICD-10-CM

## 2021-11-26 DIAGNOSIS — I95.1 ORTHOSTATIC HYPOTENSION: ICD-10-CM

## 2021-11-26 LAB
ALBUMIN SERPL BCP-MCNC: 3.7 G/DL (ref 3.5–5)
ALP SERPL-CCNC: 57 U/L (ref 46–116)
ALT SERPL W P-5'-P-CCNC: 39 U/L (ref 12–78)
ANION GAP SERPL CALCULATED.3IONS-SCNC: 5 MMOL/L (ref 4–13)
AST SERPL W P-5'-P-CCNC: 24 U/L (ref 5–45)
BASOPHILS # BLD AUTO: 0.03 THOUSANDS/ΜL (ref 0–0.1)
BASOPHILS NFR BLD AUTO: 1 % (ref 0–1)
BILIRUB SERPL-MCNC: 0.44 MG/DL (ref 0.2–1)
BUN SERPL-MCNC: 9 MG/DL (ref 5–25)
CALCIUM SERPL-MCNC: 8.9 MG/DL (ref 8.3–10.1)
CHLORIDE SERPL-SCNC: 109 MMOL/L (ref 100–108)
CHOLEST SERPL-MCNC: 150 MG/DL
CO2 SERPL-SCNC: 26 MMOL/L (ref 21–32)
CREAT SERPL-MCNC: 0.89 MG/DL (ref 0.6–1.3)
EOSINOPHIL # BLD AUTO: 0.2 THOUSAND/ΜL (ref 0–0.61)
EOSINOPHIL NFR BLD AUTO: 3 % (ref 0–6)
ERYTHROCYTE [DISTWIDTH] IN BLOOD BY AUTOMATED COUNT: 13.2 % (ref 11.6–15.1)
GFR SERPL CREATININE-BSD FRML MDRD: 80 ML/MIN/1.73SQ M
GLUCOSE P FAST SERPL-MCNC: 106 MG/DL (ref 65–99)
HCT VFR BLD AUTO: 39.2 % (ref 34.8–46.1)
HDLC SERPL-MCNC: 38 MG/DL
HGB BLD-MCNC: 12.9 G/DL (ref 11.5–15.4)
IMM GRANULOCYTES # BLD AUTO: 0.02 THOUSAND/UL (ref 0–0.2)
IMM GRANULOCYTES NFR BLD AUTO: 0 % (ref 0–2)
LDLC SERPL CALC-MCNC: 71 MG/DL (ref 0–100)
LYMPHOCYTES # BLD AUTO: 1.61 THOUSANDS/ΜL (ref 0.6–4.47)
LYMPHOCYTES NFR BLD AUTO: 28 % (ref 14–44)
MCH RBC QN AUTO: 30.2 PG (ref 26.8–34.3)
MCHC RBC AUTO-ENTMCNC: 32.9 G/DL (ref 31.4–37.4)
MCV RBC AUTO: 92 FL (ref 82–98)
MONOCYTES # BLD AUTO: 0.45 THOUSAND/ΜL (ref 0.17–1.22)
MONOCYTES NFR BLD AUTO: 8 % (ref 4–12)
NEUTROPHILS # BLD AUTO: 3.55 THOUSANDS/ΜL (ref 1.85–7.62)
NEUTS SEG NFR BLD AUTO: 60 % (ref 43–75)
NRBC BLD AUTO-RTO: 0 /100 WBCS
PLATELET # BLD AUTO: 270 THOUSANDS/UL (ref 149–390)
PMV BLD AUTO: 10.4 FL (ref 8.9–12.7)
POTASSIUM SERPL-SCNC: 4.2 MMOL/L (ref 3.5–5.3)
PROT SERPL-MCNC: 7.2 G/DL (ref 6.4–8.2)
RBC # BLD AUTO: 4.27 MILLION/UL (ref 3.81–5.12)
SODIUM SERPL-SCNC: 140 MMOL/L (ref 136–145)
TRIGL SERPL-MCNC: 203 MG/DL
TSH SERPL DL<=0.05 MIU/L-ACNC: 0.69 UIU/ML (ref 0.36–3.74)
WBC # BLD AUTO: 5.86 THOUSAND/UL (ref 4.31–10.16)

## 2021-11-26 PROCEDURE — 36415 COLL VENOUS BLD VENIPUNCTURE: CPT

## 2021-11-26 PROCEDURE — 80053 COMPREHEN METABOLIC PANEL: CPT

## 2021-11-26 PROCEDURE — 85025 COMPLETE CBC W/AUTO DIFF WBC: CPT

## 2021-11-26 PROCEDURE — 84443 ASSAY THYROID STIM HORMONE: CPT

## 2021-11-26 PROCEDURE — 80061 LIPID PANEL: CPT

## 2021-11-30 ENCOUNTER — OFFICE VISIT (OUTPATIENT)
Dept: FAMILY MEDICINE CLINIC | Facility: CLINIC | Age: 41
End: 2021-11-30
Payer: COMMERCIAL

## 2021-11-30 VITALS
BODY MASS INDEX: 38.32 KG/M2 | RESPIRATION RATE: 16 BRPM | WEIGHT: 230 LBS | TEMPERATURE: 97.5 F | HEIGHT: 65 IN | HEART RATE: 74 BPM | SYSTOLIC BLOOD PRESSURE: 112 MMHG | DIASTOLIC BLOOD PRESSURE: 76 MMHG | OXYGEN SATURATION: 97 %

## 2021-11-30 DIAGNOSIS — Z23 NEED FOR IMMUNIZATION AGAINST INFLUENZA: ICD-10-CM

## 2021-11-30 DIAGNOSIS — F32.A DEPRESSION, UNSPECIFIED DEPRESSION TYPE: ICD-10-CM

## 2021-11-30 DIAGNOSIS — F33.9 DEPRESSION, RECURRENT (HCC): ICD-10-CM

## 2021-11-30 DIAGNOSIS — J45.40 MODERATE PERSISTENT ASTHMA WITHOUT COMPLICATION: ICD-10-CM

## 2021-11-30 DIAGNOSIS — Z00.00 ANNUAL PHYSICAL EXAM: Primary | ICD-10-CM

## 2021-11-30 DIAGNOSIS — J40 BRONCHITIS: ICD-10-CM

## 2021-11-30 PROCEDURE — 3008F BODY MASS INDEX DOCD: CPT | Performed by: FAMILY MEDICINE

## 2021-11-30 PROCEDURE — 90471 IMMUNIZATION ADMIN: CPT

## 2021-11-30 PROCEDURE — 3725F SCREEN DEPRESSION PERFORMED: CPT | Performed by: FAMILY MEDICINE

## 2021-11-30 PROCEDURE — 99396 PREV VISIT EST AGE 40-64: CPT | Performed by: FAMILY MEDICINE

## 2021-11-30 PROCEDURE — 1036F TOBACCO NON-USER: CPT | Performed by: FAMILY MEDICINE

## 2021-11-30 PROCEDURE — 90682 RIV4 VACC RECOMBINANT DNA IM: CPT

## 2021-11-30 RX ORDER — MONTELUKAST SODIUM 10 MG/1
10 TABLET ORAL
Qty: 30 TABLET | Refills: 5 | Status: SHIPPED | OUTPATIENT
Start: 2021-11-30 | End: 2022-06-06 | Stop reason: SDUPTHER

## 2021-11-30 RX ORDER — ESCITALOPRAM OXALATE 10 MG/1
10 TABLET ORAL DAILY
Qty: 30 TABLET | Refills: 5 | Status: SHIPPED | OUTPATIENT
Start: 2021-11-30 | End: 2022-06-06 | Stop reason: SDUPTHER

## 2021-11-30 RX ORDER — ALBUTEROL SULFATE 90 UG/1
2 AEROSOL, METERED RESPIRATORY (INHALATION) EVERY 6 HOURS PRN
Qty: 18 G | Refills: 1 | Status: SHIPPED | OUTPATIENT
Start: 2021-11-30

## 2022-02-02 ENCOUNTER — APPOINTMENT (EMERGENCY)
Dept: RADIOLOGY | Facility: HOSPITAL | Age: 42
End: 2022-02-02
Payer: COMMERCIAL

## 2022-02-02 ENCOUNTER — HOSPITAL ENCOUNTER (EMERGENCY)
Facility: HOSPITAL | Age: 42
Discharge: HOME | End: 2022-02-02
Attending: EMERGENCY MEDICINE
Payer: COMMERCIAL

## 2022-02-02 VITALS
WEIGHT: 242.06 LBS | RESPIRATION RATE: 16 BRPM | OXYGEN SATURATION: 98 % | SYSTOLIC BLOOD PRESSURE: 128 MMHG | TEMPERATURE: 98 F | HEART RATE: 69 BPM | DIASTOLIC BLOOD PRESSURE: 60 MMHG | BODY MASS INDEX: 40.28 KG/M2

## 2022-02-02 DIAGNOSIS — S60.352A FOREIGN BODY OF LEFT THUMB, INITIAL ENCOUNTER: Primary | ICD-10-CM

## 2022-02-02 PROCEDURE — 99284 EMERGENCY DEPT VISIT MOD MDM: CPT | Performed by: PHYSICIAN ASSISTANT

## 2022-02-02 PROCEDURE — 90715 TDAP VACCINE 7 YRS/> IM: CPT | Performed by: PHYSICIAN ASSISTANT

## 2022-02-02 PROCEDURE — 90471 IMMUNIZATION ADMIN: CPT

## 2022-02-02 PROCEDURE — 73140 X-RAY EXAM OF FINGER(S): CPT

## 2022-02-02 PROCEDURE — 64450 NJX AA&/STRD OTHER PN/BRANCH: CPT | Performed by: PHYSICIAN ASSISTANT

## 2022-02-02 PROCEDURE — 99283 EMERGENCY DEPT VISIT LOW MDM: CPT

## 2022-02-02 RX ORDER — LIDOCAINE HYDROCHLORIDE 10 MG/ML
10 INJECTION, SOLUTION EPIDURAL; INFILTRATION; INTRACAUDAL; PERINEURAL ONCE
Status: COMPLETED | OUTPATIENT
Start: 2022-02-02 | End: 2022-02-02

## 2022-02-02 RX ORDER — GINSENG 100 MG
1 CAPSULE ORAL ONCE
Status: COMPLETED | OUTPATIENT
Start: 2022-02-02 | End: 2022-02-02

## 2022-02-02 RX ADMIN — BACITRACIN ZINC 1 SMALL APPLICATION: 500 OINTMENT TOPICAL at 22:01

## 2022-02-02 RX ADMIN — LIDOCAINE HYDROCHLORIDE 10 ML: 10 INJECTION, SOLUTION EPIDURAL; INFILTRATION; INTRACAUDAL; PERINEURAL at 22:02

## 2022-02-02 RX ADMIN — TETANUS TOXOID, REDUCED DIPHTHERIA TOXOID AND ACELLULAR PERTUSSIS VACCINE, ADSORBED 0.5 ML: 5; 2.5; 8; 8; 2.5 SUSPENSION INTRAMUSCULAR at 22:01

## 2022-02-02 NOTE — Clinical Note
Ibrahima Barnard was seen and treated in our emergency department on 2/2/2022  Diagnosis: Foreign body of finger    Shaan Puga  may return to work on return date  She may return on this date: 02/04/2022         If you have any questions or concerns, please don't hesitate to call        Andrew Warren PA-C    ______________________________           _______________          _______________  Hospital Representative                              Date                                Time

## 2022-02-03 NOTE — DISCHARGE INSTRUCTIONS
You were seen in the emergency department today for foreign body of finger  Radiologic imaging reveal successful removal  Please follow-up with your primary care physician regarding your symptoms  Please follow-up with hand surgery for worsening symptoms  Return sooner to the Emergency Department if increased pain, fever, pus, redness, swelling, red streaks, vomiting, weakness, numbness, bleeding  Elevate  Keep wound dry for 2 days  Wound check in 2 days

## 2022-02-03 NOTE — ED PROVIDER NOTES
History  Chief Complaint   Patient presents with    Medical Problem     Pt reports stuck herself with sewing needle and the needle broke in her left thumb bleeding controlled at this time      Jaylene Salazar is a 35 y o   female with PMH of asthma and migraines who presents to the emergency department with finger injury  The patient states that approximately one hour prior to arrival  She was using a suing machine and the needle punctured her left thumb  She states she thinks it punctured twice and on the second puncture she jerked back and the needle broke in her finger  She states she has mild pain  She has full ROM of her thumb  Unsure last tetanus- 2015 per chart review  No prior injuries to hand  No allergies  History provided by:  Patient   used: No    Foreign Body in Skin  Location:  Skin  Suspected object:  Metal  Pain quality:  Aching  Pain severity:  Mild  Duration:  1 hour  Timing:  Constant  Progression:  Unchanged  Chronicity:  New  Exacerbated by: palpation  Ineffective treatments:  None tried  Associated symptoms: no abdominal pain, no choking, no congestion, no cough, no difficulty breathing, no drooling, no ear discharge, no ear pain, no nasal discharge, no nausea, no nosebleeds, no rectal bleeding, no rectal pain, no rhinorrhea, no sore throat, no trouble swallowing, no voice change and no vomiting    Risk factors: no mental health problem, no prior similar events and no prior surgery to area        Prior to Admission Medications   Prescriptions Last Dose Informant Patient Reported? Taking?    albuterol (Ventolin HFA) 90 mcg/act inhaler   No No   Sig: Inhale 2 puffs every 6 (six) hours as needed for wheezing or shortness of breath   camphor-menthol (SARNA) lotion  Self No No   Sig: Apply topically as needed for itching   cetirizine (ZyrTEC) 5 MG tablet  Self Yes No   Sig: Take 10 mg by mouth daily   escitalopram (LEXAPRO) 10 mg tablet   No No   Sig: Take 1 tablet (10 mg total) by mouth daily   ibuprofen (MOTRIN) 600 mg tablet  Self Yes No   Sig: Take 600 mg by mouth every 8 (eight) hours as needed   montelukast (SINGULAIR) 10 mg tablet   No No   Sig: Take 1 tablet (10 mg total) by mouth daily at bedtime      Facility-Administered Medications: None       Past Medical History:   Diagnosis Date    Allergic rhinitis     Asthma     Complex ovarian cyst     last assessed: 2014    Condyloma acuminatum     Depression     Dysmenorrhea     last assessed: 2013    H/O human papillomavirus infection     History of ankle surgery     Migraine     Palpitations     last assessed: 2013    TMJ dysfunction        Past Surgical History:   Procedure Laterality Date    ANKLE FRACTURE SURGERY Left     CERVICAL BIOPSY  W/ LOOP ELECTRODE EXCISION       SECTION      low transverse    COSMETIC SURGERY      Breast reduction    KNEE SURGERY      REDUCTION MAMMAPLASTY      ROTATOR CUFF REPAIR  2014    TUBAL LIGATION         Family History   Problem Relation Age of Onset    Breast cancer Mother 37    Cancer Father         systemic amylodosis    Breast cancer Paternal Grandmother 43    Breast cancer Paternal Aunt 46    Cancer Maternal Grandfather         renal    No Known Problems Daughter     No Known Problems Paternal Aunt      I have reviewed and agree with the history as documented      E-Cigarette/Vaping    E-Cigarette Use Never User      E-Cigarette/Vaping Substances    Nicotine No     THC No     CBD No     Flavoring No     Other No     Unknown No      Social History     Tobacco Use    Smoking status: Former Smoker    Smokeless tobacco: Never Used    Tobacco comment: former smoker (as per allscripts)   Vaping Use    Vaping Use: Never used   Substance Use Topics    Alcohol use: No    Drug use: No       Review of Systems   Constitutional: Negative for activity change, appetite change, chills, diaphoresis, fever and unexpected weight change  HENT: Negative for congestion, dental problem, drooling, ear discharge, ear pain, mouth sores, nosebleeds, rhinorrhea, sinus pressure, sinus pain, sneezing, sore throat, trouble swallowing and voice change  Respiratory: Negative for apnea, cough, choking, chest tightness, shortness of breath, wheezing and stridor  Cardiovascular: Negative for chest pain, palpitations and leg swelling  Gastrointestinal: Negative for abdominal pain, constipation, diarrhea, nausea, rectal pain and vomiting  Genitourinary: Negative for dysuria, flank pain, frequency and urgency  Musculoskeletal: Negative for arthralgias, joint swelling and myalgias  Skin: Positive for wound  Negative for color change, pallor and rash  Neurological: Negative for dizziness, tremors, seizures, syncope, speech difficulty, weakness, light-headedness, numbness and headaches  All other systems reviewed and are negative  Physical Exam  Physical Exam  Vitals and nursing note reviewed  Constitutional:       General: She is not in acute distress  Appearance: Normal appearance  She is normal weight  She is not ill-appearing or toxic-appearing  HENT:      Head: Normocephalic and atraumatic  Mouth/Throat:      Mouth: Mucous membranes are moist       Pharynx: Oropharynx is clear  Eyes:      Extraocular Movements: Extraocular movements intact  Pupils: Pupils are equal, round, and reactive to light  Cardiovascular:      Rate and Rhythm: Normal rate and regular rhythm  Pulses: Normal pulses  Heart sounds: Normal heart sounds  No murmur heard  No friction rub  No gallop  Pulmonary:      Effort: Pulmonary effort is normal  No respiratory distress  Breath sounds: Normal breath sounds  No stridor  No wheezing, rhonchi or rales  Abdominal:      General: Abdomen is flat  Bowel sounds are normal  There is no distension  Palpations: Abdomen is soft  There is no mass  Tenderness:  There is no abdominal tenderness  There is no guarding or rebound  Hernia: No hernia is present  Musculoskeletal:         General: No swelling, tenderness, deformity or signs of injury  Normal range of motion  Hands:       Cervical back: Normal range of motion  No rigidity or tenderness  Right lower leg: No edema  Left lower leg: No edema  Comments: Left upper extremity:  Puncture wound to distal aspect of left thumb  2+ radial Pulse/ Cap Refill <2 seconds  Shoulder: NTTP, Strength 5/5- FROM including ER/IR/Abduction/Adduction/Flexion/Extension  Elbow: NTTP, Strength 5/5- FROM including Flexion/Extension/ Pronation/supination  Wrist: NTTP, Strength 5/5- FROM including Flexion/Extension/Ulnar Deviation/Radial Deviation  Hand: NTTP: Strength 5/5- FROM at all fingers including flexion, extension, abduction, adduction, and opposition of the thumb  FDS/FDP tendons intact by exam, Extensor tendons intact by exam    Radial/Ulnar/ Median/ and Axillary sensation intact  Skin:     General: Skin is warm and dry  Capillary Refill: Capillary refill takes less than 2 seconds  Comments: Small puncture wound identified on palmar aspect of distal thumb  Small area of blood at nail fold  Metallic FB visible through puncture wound on palmar aspect of hand  Hemostatic  Neurological:      General: No focal deficit present  Mental Status: She is alert and oriented to person, place, and time  Mental status is at baseline  Cranial Nerves: No cranial nerve deficit  Sensory: No sensory deficit  Motor: No weakness        Coordination: Coordination normal          Vital Signs  ED Triage Vitals   Temperature Pulse Respirations Blood Pressure SpO2   02/02/22 2041 02/02/22 2040 02/02/22 2040 02/02/22 2040 02/02/22 2040   98 °F (36 7 °C) 69 16 128/60 98 %      Temp Source Heart Rate Source Patient Position - Orthostatic VS BP Location FiO2 (%)   02/02/22 2041 02/02/22 2040 02/02/22 2040 02/02/22 2040 --   Oral Monitor Sitting Right arm       Pain Score       --                  Vitals:    02/02/22 2040   BP: 128/60   Pulse: 69   Patient Position - Orthostatic VS: Sitting         Visual Acuity      ED Medications  Medications   lidocaine (PF) (XYLOCAINE-MPF) 1 % injection 10 mL (10 mL Infiltration Given 2/2/22 2202)   bacitracin topical ointment 1 small application (1 small application Topical Given 2/2/22 2201)   tetanus-diphtheria-acellular pertussis (BOOSTRIX) IM injection 0 5 mL (0 5 mL Intramuscular Given 2/2/22 2201)       Diagnostic Studies  Results Reviewed     None                 XR thumb first digit-min 2 views LEFT   ED Interpretation by Mason William PA-C (02/02 2258)   No FB identified  No acute osseous abnormality      XR thumb first digit-min 2 views LEFT   ED Interpretation by Mason William PA-C (02/02 2210)   FB present in distal 1st digit  No osseous abnormality as interpreted by me  Procedures  Foreign Body - Embedded    Date/Time: 2/2/2022 10:45 PM  Performed by: Mason William PA-C  Authorized by: Mason William PA-C     Consent:     Consent obtained:  Verbal    Consent given by:  Patient    Risks discussed:  Bleeding, infection, pain, worsening of condition, poor cosmetic result, nerve damage and incomplete removal    Alternatives discussed:  No treatment, alternative treatment, observation, referral and delayed treatment  Universal protocol:     Procedure explained and questions answered to patient or proxy's satisfaction: yes      Relevant documents present and verified: yes      Radiology Images displayed and confirmed    If images not available, report reviewed : yes      Required blood products, implants, devices, and special equipment available: yes      Site/side marked: yes      Immediately prior to procedure, a time out was called: yes      Patient identity confirmed:  Verbally with patient, hospital-assigned identification number and arm band  Location:     Location:  Finger    Finger location:  L thumb    Depth:  Subcutaneous  Pre-procedure details:     Imaging:  X-ray    Neurovascular status: intact      Preparation: Patient was prepped and draped in usual sterile fashion    Anesthesia (see MAR for exact dosages): Anesthesia method:  Nerve block    Block anesthetic:  Lidocaine 1% w/o epi    Block technique:  Digital Block     Block injection procedure:  Anatomic landmarks identified, introduced needle, incremental injection and negative aspiration for blood    Block outcome:  Anesthesia achieved  Procedure details:     Localization method:  Visualized    Dissection of underlying tissues: no      Bloodless field: yes      Removal mechanism:  Alligator forceps    Removal Method:  Open    Procedure complexity:  Simple    Foreign bodies recovered:  1    Description:  5mm metalic needle    Intact foreign body removal: yes    Post-procedure details:     Neurovascular status: intact      Confirmation:  No additional foreign bodies on visualization    Skin closure:  None    Dressing:  Antibiotic ointment and adhesive bandage    Patient tolerance of procedure: Tolerated well, no immediate complications  Comments:      Neurovascularly intact with FROM pre procedure  CR < 2 sec  Patient was prepped with betadine and draped  Digital block performed  Anesthesia achieved with digital block in five minutes  Alligator forceps used to remove FB  Successful removal after second attempt  Bloodless field  FROM and NV intact post procedure with good cap refill  X-ray confirmed removal               ED Course                               SBIRT 22yo+      Most Recent Value   SBIRT (24 yo +)    In order to provide better care to our patients, we are screening all of our patients for alcohol and drug use  Would it be okay to ask you these screening questions? Yes Filed at: 02/02/2022 5694   Initial Alcohol Screen: US AUDIT-C     1   How often do you have a drink containing alcohol? 0 Filed at: 02/02/2022 2206   2  How many drinks containing alcohol do you have on a typical day you are drinking? 0 Filed at: 02/02/2022 2206   3a  Male UNDER 65: How often do you have five or more drinks on one occasion? 0 Filed at: 02/02/2022 2206   3b  FEMALE Any Age, or MALE 65+: How often do you have 4 or more drinks on one occassion? 0 Filed at: 02/02/2022 2206   Audit-C Score 0 Filed at: 02/02/2022 2206   MARLI: How many times in the past year have you    Used an illegal drug or used a prescription medication for non-medical reasons? Never Filed at: 02/02/2022 2206                    MDM  Number of Diagnoses or Management Options  Foreign body of left thumb, initial encounter: new and requires workup  Diagnosis management comments: Patient was seen and examined  in the emergency department for chief complaint of foreign body in thumb  The patient presented approximately 1 hours after using a sewing machine which punctured her left thumb  Insertion was at the nail margin of the distal aspect of the thumb  States that puncture twice on the 2nd time she pulled back causing the needle to break     She states she can see the needle through the palmar aspect  Mild pain at the site  Otherwise full range of motion and good cap refill  Unsure last tetanus  On exam patient has full range of motion of all fingers of the left hand including the thumb  She has good range of motion of the flexor and extensor tendon movement of the thumb  She has good cap refill distally with sensation intact  There is a small puncture wound on the palmar aspect of the thumb with a metallic foreign body present  Metallic foreign body appears to be a needle  There was a small area blood at the nail margin on the dorsal aspect of the hand-likely insertion       Initial differential includes but is not limited to:  Routine foreign body, puncture wound, tendon involvement, vascular vomiting, fraction, deep tissue involvement      Workup:  Please see procedure note regarding successful removal foreign body  Tetanus is updated bacitracin was applied to the wound  We did discuss follow-up with hand surgery for further evaluation and management  We discussed wound check and PCP approximately 2 days  We discussed return precautions for signs of infection or decreased range of motion  Disposition:  General impression 66-year-old female with foreign body of the left thumb successfully removed  Confirmation on x-ray  Given follow-up with hand as well as PCP for wound check in approximately 2 days  Strict return precautions discussed  Patient discharged in stable condition  The patient was discharged in stable condition  Patient ambulated off the department  Extensive return to emergency department precautions were discussed  Follow up with appropriate providers including primary care physician was discussed  Patient and/or their  primary decision maker expressed understanding  Patient remained stable during entire emergency department stay           Amount and/or Complexity of Data Reviewed  Tests in the radiology section of CPT®: ordered and reviewed  Review and summarize past medical records: yes  Independent visualization of images, tracings, or specimens: yes    Risk of Complications, Morbidity, and/or Mortality  Presenting problems: low  Diagnostic procedures: moderate  Management options: moderate    Patient Progress  Patient progress: stable      Disposition  Final diagnoses:   Foreign body of left thumb, initial encounter     Time reflects when diagnosis was documented in both MDM as applicable and the Disposition within this note     Time User Action Codes Description Comment    2/2/2022 10:59 PM Kaylee Calzada Foreign body of left thumb, initial encounter       ED Disposition     ED Disposition Condition Date/Time Comment    Discharge Stable Wed Feb 2, 2022 10:58 PM Feliciano Avelar Marvin discharge to home/self care  Follow-up Information     Follow up With Specialties Details Why Contact Info Additional Information    Marcelle Maher, DO Family Medicine Go in 2 days For wound re-check [address] [facility] Emergency Medicine Go to  As needed, If symptoms worsen [facility] [id number]  [address] [facility], [address] [facility] , [facility], [address] [place] [address] 200  Call  To schedule an appointment with a primary care physician [phone]       Dandre Ruano MD Orthopedic Surgery, Hand Surgery, Orthopedics Go to  As needed, If symptoms worsen [address] [facility]  [phone]             Patient's Medications   Discharge Prescriptions    No medications on file       No discharge procedures on file      PDMP Review       Value Time User    PDMP Reviewed  Yes 9/7/2020  7:05 PM Constance Zabala PA-C          ED Provider  Electronically Signed by           Dell Montgomery PA-C  02/02/22 2003

## 2022-02-07 ENCOUNTER — OFFICE VISIT (OUTPATIENT)
Dept: FAMILY MEDICINE CLINIC | Facility: CLINIC | Age: 42
End: 2022-02-07
Payer: COMMERCIAL

## 2022-02-07 VITALS
HEART RATE: 83 BPM | DIASTOLIC BLOOD PRESSURE: 80 MMHG | WEIGHT: 230 LBS | SYSTOLIC BLOOD PRESSURE: 110 MMHG | OXYGEN SATURATION: 98 % | HEIGHT: 65 IN | BODY MASS INDEX: 38.32 KG/M2 | TEMPERATURE: 97.6 F

## 2022-02-07 DIAGNOSIS — T14.8XXA PUNCTURE WOUND: Primary | ICD-10-CM

## 2022-02-07 PROCEDURE — 1036F TOBACCO NON-USER: CPT | Performed by: FAMILY MEDICINE

## 2022-02-07 PROCEDURE — 3008F BODY MASS INDEX DOCD: CPT | Performed by: FAMILY MEDICINE

## 2022-02-07 PROCEDURE — 99214 OFFICE O/P EST MOD 30 MIN: CPT | Performed by: FAMILY MEDICINE

## 2022-02-07 NOTE — PROGRESS NOTES
Assessment/Plan:   Continue local care  Wound seems to be healing well  Follow-up immediately for any signs of infection warmth pain or discharge  Will follow up with routine lab work in the near future  Diagnoses and all orders for this visit:    Puncture wound          Subjective:     Patient ID: Gloria Phillips is a 35 y o  female  She is in for a follow-up from the emergency department  She suffered puncture wound to her left thumb with a sewing machine needle  They did remove portion a needle that was in the thumb  They also gave her a tetanus vaccine  Review of Systems   Constitutional: Negative  HENT: Negative  Eyes: Negative  Respiratory: Negative  Cardiovascular: Negative  Gastrointestinal: Negative  Endocrine: Negative  Genitourinary: Negative  Musculoskeletal: Negative  Skin: Negative  Puncture wound left thumb  Allergic/Immunologic: Negative  Neurological: Negative  Hematological: Negative  Psychiatric/Behavioral: Negative  All other systems reviewed and are negative  Objective:     Physical Exam  Vitals and nursing note reviewed  Constitutional:       Appearance: She is well-developed  HENT:      Head: Normocephalic and atraumatic  Right Ear: External ear normal       Left Ear: External ear normal       Nose: Nose normal    Eyes:      Conjunctiva/sclera: Conjunctivae normal       Pupils: Pupils are equal, round, and reactive to light  Cardiovascular:      Rate and Rhythm: Normal rate and regular rhythm  Heart sounds: Normal heart sounds  Pulmonary:      Effort: Pulmonary effort is normal       Breath sounds: Normal breath sounds  Abdominal:      General: Bowel sounds are normal       Palpations: Abdomen is soft  Musculoskeletal:         General: Normal range of motion  Cervical back: Normal range of motion and neck supple  Skin:     General: Skin is warm and dry        Comments: Two puncture wounds in the left thumb  Be healing well  No sign of infection  No redness no drainage  Neurological:      Mental Status: She is alert and oriented to person, place, and time  Deep Tendon Reflexes: Reflexes are normal and symmetric     Psychiatric:         Behavior: Behavior normal

## 2022-05-09 ENCOUNTER — HOSPITAL ENCOUNTER (OUTPATIENT)
Dept: MAMMOGRAPHY | Facility: MEDICAL CENTER | Age: 42
Discharge: HOME | End: 2022-05-09
Payer: COMMERCIAL

## 2022-05-09 VITALS — HEIGHT: 65 IN | WEIGHT: 229.28 LBS | BODY MASS INDEX: 38.2 KG/M2

## 2022-05-09 DIAGNOSIS — Z12.31 ENCOUNTER FOR SCREENING MAMMOGRAM FOR MALIGNANT NEOPLASM OF BREAST: ICD-10-CM

## 2022-05-09 PROCEDURE — 77067 SCR MAMMO BI INCL CAD: CPT

## 2022-05-09 PROCEDURE — 77063 BREAST TOMOSYNTHESIS BI: CPT

## 2022-06-24 DIAGNOSIS — I10 HYPERTENSION, UNSPECIFIED TYPE: ICD-10-CM

## 2022-06-24 DIAGNOSIS — D18.03 HEMANGIOMA OF LIVER: ICD-10-CM

## 2022-06-25 ENCOUNTER — APPOINTMENT (OUTPATIENT)
Dept: LAB | Facility: MEDICAL CENTER | Age: 42
End: 2022-06-25
Payer: COMMERCIAL

## 2022-06-25 DIAGNOSIS — I10 HYPERTENSION, UNSPECIFIED TYPE: ICD-10-CM

## 2022-06-25 DIAGNOSIS — D18.03 HEMANGIOMA OF LIVER: ICD-10-CM

## 2022-06-25 LAB
ALBUMIN SERPL BCP-MCNC: 3.9 G/DL (ref 3.5–5)
ALP SERPL-CCNC: 62 U/L (ref 46–116)
ALT SERPL W P-5'-P-CCNC: 41 U/L (ref 12–78)
ANION GAP SERPL CALCULATED.3IONS-SCNC: 4 MMOL/L (ref 4–13)
AST SERPL W P-5'-P-CCNC: 26 U/L (ref 5–45)
BILIRUB SERPL-MCNC: 0.33 MG/DL (ref 0.2–1)
BUN SERPL-MCNC: 11 MG/DL (ref 5–25)
CALCIUM SERPL-MCNC: 9 MG/DL (ref 8.3–10.1)
CHLORIDE SERPL-SCNC: 109 MMOL/L (ref 100–108)
CHOLEST SERPL-MCNC: 170 MG/DL
CO2 SERPL-SCNC: 29 MMOL/L (ref 21–32)
CREAT SERPL-MCNC: 0.82 MG/DL (ref 0.6–1.3)
GFR SERPL CREATININE-BSD FRML MDRD: 87 ML/MIN/1.73SQ M
GLUCOSE P FAST SERPL-MCNC: 98 MG/DL (ref 65–99)
HDLC SERPL-MCNC: 36 MG/DL
LDLC SERPL CALC-MCNC: 91 MG/DL (ref 0–100)
POTASSIUM SERPL-SCNC: 4.3 MMOL/L (ref 3.5–5.3)
PROT SERPL-MCNC: 7.5 G/DL (ref 6.4–8.2)
SODIUM SERPL-SCNC: 142 MMOL/L (ref 136–145)
TRIGL SERPL-MCNC: 214 MG/DL

## 2022-06-25 PROCEDURE — 80061 LIPID PANEL: CPT

## 2022-06-25 PROCEDURE — 80053 COMPREHEN METABOLIC PANEL: CPT | Performed by: FAMILY MEDICINE

## 2022-06-25 PROCEDURE — 36415 COLL VENOUS BLD VENIPUNCTURE: CPT | Performed by: FAMILY MEDICINE

## 2022-07-19 ENCOUNTER — OFFICE VISIT (OUTPATIENT)
Dept: FAMILY MEDICINE CLINIC | Facility: CLINIC | Age: 42
End: 2022-07-19
Payer: COMMERCIAL

## 2022-07-19 VITALS
DIASTOLIC BLOOD PRESSURE: 80 MMHG | HEIGHT: 65 IN | HEART RATE: 80 BPM | TEMPERATURE: 96.6 F | SYSTOLIC BLOOD PRESSURE: 120 MMHG | BODY MASS INDEX: 38.15 KG/M2

## 2022-07-19 DIAGNOSIS — F32.A DEPRESSION, UNSPECIFIED DEPRESSION TYPE: Primary | ICD-10-CM

## 2022-07-19 DIAGNOSIS — F33.1 MODERATE EPISODE OF RECURRENT MAJOR DEPRESSIVE DISORDER (HCC): ICD-10-CM

## 2022-07-19 DIAGNOSIS — J45.40 MODERATE PERSISTENT ASTHMA WITHOUT COMPLICATION: ICD-10-CM

## 2022-07-19 DIAGNOSIS — F33.9 DEPRESSION, RECURRENT (HCC): ICD-10-CM

## 2022-07-19 DIAGNOSIS — F51.01 PRIMARY INSOMNIA: ICD-10-CM

## 2022-07-19 PROCEDURE — 3725F SCREEN DEPRESSION PERFORMED: CPT | Performed by: FAMILY MEDICINE

## 2022-07-19 PROCEDURE — 99214 OFFICE O/P EST MOD 30 MIN: CPT | Performed by: FAMILY MEDICINE

## 2022-07-19 RX ORDER — TRAZODONE HYDROCHLORIDE 50 MG/1
50 TABLET ORAL
Qty: 30 TABLET | Refills: 5 | Status: SHIPPED | OUTPATIENT
Start: 2022-07-19 | End: 2022-07-25 | Stop reason: ALTCHOICE

## 2022-07-19 RX ORDER — ESCITALOPRAM OXALATE 5 MG/1
5 TABLET ORAL DAILY
Qty: 90 TABLET | Refills: 1 | Status: SHIPPED | OUTPATIENT
Start: 2022-07-19 | End: 2022-07-21 | Stop reason: SDUPTHER

## 2022-07-19 RX ORDER — ESCITALOPRAM OXALATE 10 MG/1
10 TABLET ORAL DAILY
Qty: 90 TABLET | Refills: 1 | Status: SHIPPED | OUTPATIENT
Start: 2022-07-19 | End: 2022-07-21 | Stop reason: SDUPTHER

## 2022-07-19 RX ORDER — GABAPENTIN 300 MG/1
300 CAPSULE ORAL 3 TIMES DAILY
COMMUNITY
Start: 2022-07-15

## 2022-07-19 RX ORDER — MONTELUKAST SODIUM 10 MG/1
10 TABLET ORAL
Qty: 90 TABLET | Refills: 1 | Status: SHIPPED | OUTPATIENT
Start: 2022-07-19 | End: 2022-07-21 | Stop reason: SDUPTHER

## 2022-07-19 NOTE — PROGRESS NOTES
Assessment/Plan:  We did review her lab work today  We adjusted her therapy  I increased the Lexapro to 15 mg daily  I added trazodone 50 mg at bedtime  Follow-up in 6-8 weeks to see how she is doing  She will continue with counseling           Diagnoses and all orders for this visit:    Depression, unspecified depression type  -     escitalopram (LEXAPRO) 5 mg tablet; Take 1 tablet (5 mg total) by mouth daily  -     escitalopram (LEXAPRO) 10 mg tablet; Take 1 tablet (10 mg total) by mouth daily    Moderate persistent asthma without complication  -     montelukast (SINGULAIR) 10 mg tablet; Take 1 tablet (10 mg total) by mouth daily at bedtime    Depression, recurrent (HCC)    Moderate episode of recurrent major depressive disorder ([facility] [address] )    Primary insomnia  -     traZODone (DESYREL) 50 mg tablet; Take 1 tablet (50 mg total) by mouth daily at bedtime as needed for sleep    Other orders  -     gabapentin (NEURONTIN) 300 mg capsule; Take 300 mg by mouth 3 (three) times a day          Subjective:        Patient ID: Starr Reich is a 38 y o  female  She is in today for follow-up  She does complain of increase in her depression symptoms is is wondering if she could bump up the Lexapro  Also she is not sleeping  The following portions of the patient's history were reviewed and updated as appropriate: allergies, current medications, past family history, past medical history, past social history, past surgical history and problem list       Review of Systems   Constitutional: Negative  HENT: Negative  Eyes: Negative  Respiratory: Negative  Cardiovascular: Negative  Gastrointestinal: Negative  Endocrine: Negative  Genitourinary: Negative  Musculoskeletal: Negative  Skin: Negative  Allergic/Immunologic: Negative  Neurological: Negative  Hematological: Negative  Psychiatric/Behavioral: Positive for dysphoric mood and sleep disturbance     All other systems reviewed and are negative            Objective:             /80   Pulse 80   Temp (!) 96 6 °F (35 9 °C)   Ht 5' 5" (1 651 m)   BMI 38 15 kg/m²          Physical Exam

## 2022-07-21 DIAGNOSIS — F32.A DEPRESSION, UNSPECIFIED DEPRESSION TYPE: ICD-10-CM

## 2022-07-21 DIAGNOSIS — J45.40 MODERATE PERSISTENT ASTHMA WITHOUT COMPLICATION: ICD-10-CM

## 2022-07-21 RX ORDER — ESCITALOPRAM OXALATE 5 MG/1
5 TABLET ORAL DAILY
Qty: 90 TABLET | Refills: 0 | Status: SHIPPED | OUTPATIENT
Start: 2022-07-21 | End: 2022-10-22 | Stop reason: SDUPTHER

## 2022-07-21 RX ORDER — ESCITALOPRAM OXALATE 10 MG/1
10 TABLET ORAL DAILY
Qty: 90 TABLET | Refills: 0 | Status: SHIPPED | OUTPATIENT
Start: 2022-07-21 | End: 2022-10-22 | Stop reason: SDUPTHER

## 2022-07-21 RX ORDER — MONTELUKAST SODIUM 10 MG/1
10 TABLET ORAL
Qty: 90 TABLET | Refills: 0 | Status: SHIPPED | OUTPATIENT
Start: 2022-07-21 | End: 2022-10-24 | Stop reason: SDUPTHER

## 2022-07-25 ENCOUNTER — OFFICE VISIT (OUTPATIENT)
Dept: FAMILY MEDICINE CLINIC | Facility: CLINIC | Age: 42
End: 2022-07-25
Payer: COMMERCIAL

## 2022-07-25 VITALS
TEMPERATURE: 97.3 F | HEART RATE: 76 BPM | OXYGEN SATURATION: 98 % | BODY MASS INDEX: 37.57 KG/M2 | HEIGHT: 66 IN | SYSTOLIC BLOOD PRESSURE: 124 MMHG | DIASTOLIC BLOOD PRESSURE: 80 MMHG

## 2022-07-25 DIAGNOSIS — F33.1 MODERATE EPISODE OF RECURRENT MAJOR DEPRESSIVE DISORDER (HCC): ICD-10-CM

## 2022-07-25 DIAGNOSIS — R73.01 ELEVATED FASTING GLUCOSE: ICD-10-CM

## 2022-07-25 DIAGNOSIS — F41.9 ANXIETY: ICD-10-CM

## 2022-07-25 DIAGNOSIS — Z76.89 ENCOUNTER TO ESTABLISH CARE: Primary | ICD-10-CM

## 2022-07-25 PROCEDURE — 99204 OFFICE O/P NEW MOD 45 MIN: CPT | Performed by: FAMILY MEDICINE

## 2022-07-25 RX ORDER — LIDOCAINE HYDROCHLORIDE 20 MG/ML
1 JELLY TOPICAL 2 TIMES DAILY
COMMUNITY
Start: 2022-07-21

## 2022-07-25 RX ORDER — HYDROXYZINE HYDROCHLORIDE 25 MG/1
25 TABLET, FILM COATED ORAL
Qty: 30 TABLET | Refills: 1 | Status: SHIPPED | OUTPATIENT
Start: 2022-07-25 | End: 2022-09-12 | Stop reason: SDUPTHER

## 2022-07-26 PROBLEM — M76.822 POSTERIOR TIBIAL TENDON DYSFUNCTION (PTTD) OF LEFT LOWER EXTREMITY: Status: ACTIVE | Noted: 2022-04-15

## 2022-07-26 PROBLEM — Z76.89 ENCOUNTER TO ESTABLISH CARE: Status: ACTIVE | Noted: 2022-07-26

## 2022-07-26 PROBLEM — F41.9 ANXIETY: Status: ACTIVE | Noted: 2022-07-26

## 2022-07-26 PROBLEM — F50.819 BINGE EATING DISORDER: Status: ACTIVE | Noted: 2022-07-26

## 2022-07-26 PROBLEM — F50.81 BINGE EATING DISORDER: Status: ACTIVE | Noted: 2022-07-26

## 2022-07-26 NOTE — ASSESSMENT & PLAN NOTE
- Hepatitis C screening declined at this time   - The USPSTF recommends screening for prediabetes and type 2 diabetes in adults aged 28 to 79 years who are overweight or obese  Hemoglobin A1C ordered

## 2022-07-26 NOTE — ASSESSMENT & PLAN NOTE
- Counseled patient on the importance of working to achieve weight reduction goal  Recommended diet include mediterranean and DASH  Primary focus should be unprocessed foods, fruits, vegetables, plant based fats and protein, legumes, whole grain and nuts  Limit red meats  Vegetables and fruit should make up 1/2 each meal    - Will order a hemoglobin A1C given elevated fasting glucose noted previously  - Discussed weight loss medication and patient would certainly be a candidate for Vivian Spears should she choose to go the pharmacological route

## 2022-07-26 NOTE — PROGRESS NOTES
Assessment/Plan:    Anxiety  - Continue Lexparo 15mg in follow in 6 weeks  - Discontinue Trazodone and start a trial of hydroxyzine 25mg QHS PRN  - Continue weekly counseling sessions    BMI 30 0-30 9,adult  - Counseled patient on the importance of working to achieve weight reduction goal  Recommended diet include mediterranean and DASH  Primary focus should be unprocessed foods, fruits, vegetables, plant based fats and protein, legumes, whole grain and nuts  Limit red meats  Vegetables and fruit should make up 1/2 each meal    - Will order a hemoglobin A1C given elevated fasting glucose noted previously  - Discussed weight loss medication and patient would certainly be a candidate for Ketty [place] should she choose to go the pharmacological route  Encounter to establish care  - Hepatitis C screening declined at this time   - The USPSTF recommends screening for prediabetes and type 2 diabetes in adults aged 28 to 79 years who are overweight or obese  Hemoglobin A1C ordered  Return in about 6 weeks (around 9/5/2022)  There are no Patient Instructions on file for this visit  Diagnoses and all orders for this visit:    Encounter to establish care    Anxiety  -     hydrOXYzine HCL (ATARAX) 25 mg tablet; Take 1 tablet (25 mg total) by mouth daily at bedtime as needed for anxiety    Moderate episode of recurrent major depressive disorder (HCC)    BMI 30 0-30 9,adult  -     Cancel: Hemoglobin A1C; Future    Elevated fasting glucose  -     Cancel: Hemoglobin A1C; Future  -     HEMOGLOBIN A1C W/ EAG ESTIMATION; Future    Other orders  -     lidocaine (XYLOCAINE) 2 % topical gel; Apply 1 application topically 2 (two) times a day          Subjective:     HPI    Lidia Steve is a 38year old female with a past medical history of depression, anxiety, binge-eating disorder in remission, asthma and allergic rhinitis who presents today for an encounter to establish care   Today patient would like to discuss her depression and anxiety  She states that she has had depression since a young age but it wasn't since the passing of her father that she decided to seek help for this  She was started on Lexapro and has been stable on this medication however recently she has noticed an increase in her symptoms  There are several stressors in her life at the moment  Her daughter who is 7 has Neurofibromatosis type 2 and she is understandably worried about her  She has also been dealing with ongoing left ankle pain  She had an ankle arthroscopy with debridement, open modified brostrum reconstruction and deltoid ligament repair done on 21 and in 2022 underwent left ankle FDL tendon transfer with medial displacement calcaneal osteotomy  She is following with pain management but continues to be in a lot of pain which makes her very anxious  She has also not been able to run which is something she enjoys doing  She states that she has been having difficulty sleeping because of the anxiety  Her previous PCP recently increased her Lexapro to 15 mg and Trazodone for sleep although she has not started the Trazodone  She is waiting for the Lexapro to be delivered in the mail  She does follow with a counselor on a weekly basis for her depression, anxiety and binge eating disorder  She is also concerned regarding prediabetes as she had an elevated fasting blood glucose on previous blood work  She is also interested in possibly starting to help her lose weight as she cannot exercise right now and knows not to restrict her calories due to her history of binge eating disorder  Apart from that she endorses no other complaints at this time  She is a former smoker, does not drink alcohol or use illicit drugs  She has an extensive family history of breast cancer in her mother, paternal grandmother, and paternal aunt  Her maternal grandfather had ureteral cancer and maternal grandmother had liver cancer   Her paternal grandfather  of a myocardial infarction in his 42's and her father had Amyloidosis  Current Outpatient Medications on File Prior to Visit   Medication Sig Dispense Refill    albuterol (Ventolin HFA) 90 mcg/act inhaler Inhale 2 puffs every 6 (six) hours as needed for wheezing or shortness of breath 18 g 1    cetirizine (ZyrTEC) 5 MG tablet Take 10 mg by mouth daily      escitalopram (LEXAPRO) 10 mg tablet Take 1 tablet (10 mg total) by mouth daily 90 tablet 0    gabapentin (NEURONTIN) 300 mg capsule Take 300 mg by mouth 3 (three) times a day      montelukast (SINGULAIR) 10 mg tablet Take 1 tablet (10 mg total) by mouth daily at bedtime 90 tablet 0    escitalopram (LEXAPRO) 5 mg tablet Take 1 tablet (5 mg total) by mouth daily (Patient not taking: Reported on 2022) 90 tablet 0    lidocaine (XYLOCAINE) 2 % topical gel Apply 1 application topically 2 (two) times a day       No current facility-administered medications on file prior to visit       Past Medical History:   Diagnosis Date    Allergic     Allergic rhinitis     Anxiety     Asthma     Complex ovarian cyst     last assessed: 2014    Condyloma acuminatum     Depression     Dysmenorrhea     last assessed: 2013    H/O human papillomavirus infection     History of ankle surgery     Migraine     Palpitations     last assessed: 2013    TMJ dysfunction      Past Surgical History:   Procedure Laterality Date    ANKLE FRACTURE SURGERY Left     CERVICAL BIOPSY  W/ LOOP ELECTRODE EXCISION       SECTION      low transverse    COSMETIC SURGERY      Breast reduction    KNEE SURGERY      REDUCTION MAMMAPLASTY  2008    ROTATOR CUFF REPAIR  2014    TUBAL LIGATION       Social History     Socioeconomic History    Marital status: /Civil Union     Spouse name: None    Number of children: None    Years of education: None    Highest education level: None   Occupational History    None   Tobacco Use    Smoking status: Former Smoker    Smokeless tobacco: Never Used    Tobacco comment: former smoker (as per allscripts)   Vaping Use    Vaping Use: Never used   Substance and Sexual Activity    Alcohol use: No    Drug use: No    Sexual activity: Yes     Partners: Male     Birth control/protection: Female Sterilization   Other Topics Concern    None   Social History Narrative    Always uses seat belt    Denied: history of daily caffeinated coffee consumption    Exercises strenuously less than 3 times a week     Social Determinants of Health     Financial Resource Strain: Not on file   Food Insecurity: Not on file   Transportation Needs: Not on file   Physical Activity: Not on file   Stress: Not on file   Social Connections: Not on file   Intimate Partner Violence: Not on file   Housing Stability: Not on file     Family History   Problem Relation Age of Onset    Breast cancer Mother 37    Cancer Father         systemic amylodosis    Breast cancer Paternal Grandmother 43    Breast cancer Paternal Aunt 46    Cancer Maternal Grandfather         renal    No Known Problems Daughter     No Known Problems Paternal Aunt          Review of Systems   Constitutional: Negative  HENT: Negative  Eyes: Negative  Respiratory: Negative  Cardiovascular: Negative  Gastrointestinal: Negative  Genitourinary: Negative  Musculoskeletal: Positive for arthralgias and gait problem  Skin: Negative  Psychiatric/Behavioral: Positive for dysphoric mood and sleep disturbance  The patient is nervous/anxious  Objective:    /80 (BP Location: Left arm, Patient Position: Sitting, Cuff Size: Large)   Pulse 76   Temp (!) 97 3 °F (36 3 °C) (Skin)   Ht 5' 5 5" (1 664 m)   SpO2 98%   BMI 37 57 kg/m²     Physical Exam  Constitutional:       General: She is not in acute distress  Appearance: She is not ill-appearing  HENT:      Head: Normocephalic and atraumatic        Mouth/Throat:      Mouth: Mucous membranes are moist    Eyes:      General:         Right eye: No discharge  Left eye: No discharge  Extraocular Movements: Extraocular movements intact  Pupils: Pupils are equal, round, and reactive to light  Cardiovascular:      Rate and Rhythm: Normal rate  Pulses: Normal pulses  Heart sounds: No murmur heard  Pulmonary:      Effort: Pulmonary effort is normal  No respiratory distress  Breath sounds: No wheezing  Abdominal:      General: Bowel sounds are normal  There is no distension  Palpations: Abdomen is soft  Tenderness: There is no abdominal tenderness  There is no guarding  Musculoskeletal:      Cervical back: Normal range of motion  Right lower leg: No edema  Left lower leg: No edema  Lymphadenopathy:      Cervical: No cervical adenopathy  Neurological:      General: No focal deficit present  Mental Status: She is alert  Motor: No weakness        Coordination: Coordination normal       Deep Tendon Reflexes: Reflexes normal    Psychiatric:         Mood and Affect: Mood normal          Behavior: Behavior normal          Jaime Roberts MD  07/26/22  8:45 AM

## 2022-07-26 NOTE — ASSESSMENT & PLAN NOTE
- Continue Lexparo 15mg in follow in 6 weeks  - Discontinue Trazodone and start a trial of hydroxyzine 25mg QHS PRN  - Continue weekly counseling sessions

## 2022-08-05 ENCOUNTER — APPOINTMENT (OUTPATIENT)
Dept: LAB | Facility: MEDICAL CENTER | Age: 42
End: 2022-08-05
Payer: COMMERCIAL

## 2022-08-05 DIAGNOSIS — R73.01 ELEVATED FASTING GLUCOSE: ICD-10-CM

## 2022-08-05 LAB
EST. AVERAGE GLUCOSE BLD GHB EST-MCNC: 111 MG/DL
HBA1C MFR BLD: 5.5 %

## 2022-08-05 PROCEDURE — 36415 COLL VENOUS BLD VENIPUNCTURE: CPT

## 2022-08-05 PROCEDURE — 83036 HEMOGLOBIN GLYCOSYLATED A1C: CPT

## 2022-08-29 ENCOUNTER — ANNUAL EXAM (OUTPATIENT)
Dept: OBGYN CLINIC | Facility: CLINIC | Age: 42
End: 2022-08-29
Payer: COMMERCIAL

## 2022-08-29 VITALS — HEIGHT: 66 IN | BODY MASS INDEX: 37.01 KG/M2 | DIASTOLIC BLOOD PRESSURE: 72 MMHG | SYSTOLIC BLOOD PRESSURE: 116 MMHG

## 2022-08-29 DIAGNOSIS — Z91.89 INCREASED RISK OF BREAST CANCER: ICD-10-CM

## 2022-08-29 DIAGNOSIS — Z12.31 ENCOUNTER FOR SCREENING MAMMOGRAM FOR MALIGNANT NEOPLASM OF BREAST: ICD-10-CM

## 2022-08-29 DIAGNOSIS — Z01.419 ENCOUNTER FOR GYNECOLOGICAL EXAMINATION (GENERAL) (ROUTINE) WITHOUT ABNORMAL FINDINGS: Primary | ICD-10-CM

## 2022-08-29 PROBLEM — Z76.89 ENCOUNTER TO ESTABLISH CARE: Status: RESOLVED | Noted: 2022-07-26 | Resolved: 2022-08-29

## 2022-08-29 PROCEDURE — S0612 ANNUAL GYNECOLOGICAL EXAMINA: HCPCS | Performed by: CLINICAL NURSE SPECIALIST

## 2022-08-29 NOTE — ASSESSMENT & PLAN NOTE
Patient with family history of breast cancer on both sides of the family  Khris Amass risk is greater than 20% and this may qualify for increased surveillance    Breast MRI ordered to alternate with mammograms every 6 months  Declined genetic screening at this point

## 2022-08-29 NOTE — PROGRESS NOTES
Subjective:        Alicia Pitts is a 38 y o  female  Here for Gynecologic Exam (Pap- 3/13/2019-negative -HPV  Mammogram- 5/9/22-negative 3-D  )      GYN HPI  Here for annual gyn exam  Regarding menstrual cycle: Patient denies menstrual complaints  Regular monthly menses, not excessive  She denies any abnormal vaginal complaints; and denies any abnormal urinary complaints    Denies changes or concerns r/t breasts  She does performs self breast exams  She reports she generally eats a healthy diet and does exercise regularly  She does get dietary calcium/vit D  She denies any untreated or poorly controlled anxiety or depression sxs  She is sexually active  Contraception: tubal ligation  , She denies issues with intimacy  She reports that she does feel safe at home  The following portions of the patient's history were reviewed and updated as appropriate: allergies, current medications, past family history, past medical history, past social history, past surgical history, and problem list       Hereditary Cancer Screening  Family history reviewed and patient does meet criteria for referral for genetic cancer assessment  Substance Abuse Screening Completed  See hx and flowsheet  Screens Positive for none     HEALTH MAINTENANCE SCREENINGS:    History of abnormal pap: Yes, Daisy 2001, Last Papanicolaou test:  03/13/2019  History of abnormal mammogram: No, Last mammogram:  05/09/2022      Review of Systems   Constitutional: Negative for appetite change, chills, fatigue, fever and unexpected weight change  HENT: Negative  Eyes: Negative  Respiratory: Negative for chest tightness and shortness of breath  Cardiovascular: Negative for chest pain and palpitations  Gastrointestinal: Negative for abdominal pain, constipation and vomiting  Endocrine: Negative for cold intolerance and heat intolerance     Genitourinary:        As per HPI   Musculoskeletal: Negative for back pain, joint swelling and neck pain  Skin: Negative for color change and rash  Neurological: Negative for dizziness, weakness and numbness  Hematological: Does not bruise/bleed easily  Psychiatric/Behavioral: Negative  Objective:  /72 (BP Location: Right arm, Patient Position: Sitting, Cuff Size: Large)   Ht 5' 6" (1 676 m)   LMP 08/15/2022 (Approximate)   BMI 37 01 kg/m²        Physical Exam  Constitutional:       General: She is not in acute distress  Appearance: Normal appearance  Genitourinary:      Vulva and rectum normal       No lesions in the vagina  Right Labia: No rash or lesions  Left Labia: No lesions or rash  No vaginal discharge, erythema, tenderness or bleeding  Right Adnexa: not tender and no mass present  Left Adnexa: not tender and no mass present  No cervical motion tenderness, discharge or friability  Uterus is not enlarged or tender  No urethral prolapse present  Pelvic exam was performed with patient in the lithotomy position  Breasts: Breasts are symmetrical       Right: No inverted nipple, mass, nipple discharge, skin change or tenderness  Left: No inverted nipple, mass, nipple discharge, skin change or tenderness  Breast exam comments: Breast reduction scars  HENT:      Head: Normocephalic and atraumatic  Cardiovascular:      Rate and Rhythm: Normal rate  Heart sounds: No murmur heard  Pulmonary:      Effort: Pulmonary effort is normal       Breath sounds: Normal breath sounds  Abdominal:      General: There is no distension  Palpations: Abdomen is soft  Tenderness: There is no abdominal tenderness  Musculoskeletal:         General: Normal range of motion  Cervical back: Normal range of motion  Lymphadenopathy:      Cervical: No cervical adenopathy  Neurological:      Mental Status: She is alert and oriented to person, place, and time  Skin:     General: Skin is warm and dry  Psychiatric:         Mood and Affect: Mood normal          Behavior: Behavior normal    Vitals reviewed  Assessment/Plan:           [address]- Primary  Annual GYN examination completed today  Risk prevention and anticipatory guidance provided including:   Pap/breast screenings- see below   Risk for hereditary cancers: Family history reviewed and patient does qualify for referral for genetics consult/testing  Referral to genetics oncology offered as indicated   Calcium and vitamin D supplementation   Risk factors for lipid, diabetes and thryroid screening, ordered if needed   Dietary and lifestyle recommendations based on her age and weight  body mass index is 37 01 kg/m²  Jada Carvalho PHQ-2 depression screen completed  Reviewed and interventions recommended if needed   Tobacco and alcohol use, intervention ordered if applicable  Cervical cancer screening  Previous pap smears and ASCCP screening guidelines have been reviewed  Pap smear is not indicated at this time  Contraception   N/A- s/p permanent sterilization surgery    STI Screening  STI screening is declined  STI prevention discussed with use of condoms  Breast exam and breast cancer screening  Breast exam was done; , Reviewed recommendation for yearly screening mammogram (as per ACOG, ACS, and [address] [place] [address] Departments), however, also made her aware that there are other professional organizations that may recommend deferring mammograms until age 48 or screening every other year  CBE should still be done yearly, but may miss some cancers and alone is not as effective as breast imaging  Supplemental testing may also be indicated based on lifetime risk for breast cancer as done by [facility] , She is up to date with screening;  Next due 5/2023    Problem List Items Addressed This Visit    None     Visit Diagnoses     Encounter for screening mammogram for malignant neoplasm of breast    -  Primary Relevant Orders    Mammo screening bilateral w 3d & cad          Orders Placed This Encounter   Procedures    Mammo screening bilateral w 3d & cad

## 2022-09-09 ENCOUNTER — RA CDI HCC (OUTPATIENT)
Dept: OTHER | Facility: HOSPITAL | Age: 42
End: 2022-09-09

## 2022-09-09 NOTE — PROGRESS NOTES
[facility]  coding opportunities       Chart reviewed, no opportunity found: CHART REVIEWED, NO OPPORTUNITY FOUND        Patients Insurance        Commercial Insurance: [address] [place] [address]

## 2022-09-12 ENCOUNTER — OFFICE VISIT (OUTPATIENT)
Dept: FAMILY MEDICINE CLINIC | Facility: CLINIC | Age: 42
End: 2022-09-12
Payer: COMMERCIAL

## 2022-09-12 VITALS — HEART RATE: 77 BPM | BODY MASS INDEX: 37.01 KG/M2 | TEMPERATURE: 96.6 F | HEIGHT: 66 IN | OXYGEN SATURATION: 97 %

## 2022-09-12 DIAGNOSIS — F41.9 ANXIETY: Primary | ICD-10-CM

## 2022-09-12 DIAGNOSIS — J40 BRONCHITIS: ICD-10-CM

## 2022-09-12 PROCEDURE — 99213 OFFICE O/P EST LOW 20 MIN: CPT | Performed by: FAMILY MEDICINE

## 2022-09-12 RX ORDER — AZITHROMYCIN 250 MG/1
TABLET, FILM COATED ORAL
Qty: 6 TABLET | Refills: 0 | Status: SHIPPED | OUTPATIENT
Start: 2022-09-12 | End: 2022-09-17

## 2022-09-12 RX ORDER — HYDROXYZINE HYDROCHLORIDE 25 MG/1
25 TABLET, FILM COATED ORAL
Qty: 90 TABLET | Refills: 1 | Status: SHIPPED | OUTPATIENT
Start: 2022-09-12 | End: 2022-10-15 | Stop reason: SDUPTHER

## 2022-09-12 RX ORDER — GABAPENTIN 600 MG/1
600 TABLET ORAL 3 TIMES DAILY
COMMUNITY
Start: 2022-08-30

## 2022-09-12 NOTE — PROGRESS NOTES
Assessment/Plan:    Anxiety  - Stable; continue lexapro 15mg daily and hydroxyzine 25mg QHS PRN in conjunction with therapy sessions    - Follow up in 3 months     Bronchitis  - Patient may continue to use over the counter medications and I have also recommended the use of  neti pot for sinus pain/pressure  Given length of symptoms I will also start patient on a 5 day course of Azithromycin; she will call with any new or worsening symptoms  Diagnoses and all orders for this visit:    Anxiety  -     hydrOXYzine HCL (ATARAX) 25 mg tablet; Take 1 tablet (25 mg total) by mouth daily at bedtime as needed for anxiety    Bronchitis  -     azithromycin (Zithromax) 250 mg tablet; Take 2 tablets (500 mg total) by mouth daily for 1 day, THEN 1 tablet (250 mg total) daily for 4 days  Other orders  -     gabapentin (NEURONTIN) 600 MG tablet; Take 600 mg by mouth 3 (three) times a day          Subjective:      Patient ID: Gloria Phillips is a 38 y o  female  HPI     Gloria Phillips is a pleasant 38year old female who presents today for a follow up for anxiety  Patient states that her mood has improved greatly since starting Lexapro 15mg and starting hydroxyzine 25mg at night as needed  She continues to see her therapist weekly  She is however complaining of productive cough, sinus pressure and ear pressure for the past week  She did have a telemedicine visit last week and was told to increase her albuterol inhaler usage and was also started on a short course of prednisone with some improvement  She also is taking over the counter medication as needed  Apart from that she endorses no other complaints at this time  The following portions of the patient's history were reviewed and updated as appropriate: allergies, current medications, past family history, past medical history, past social history, past surgical history and problem list     Review of Systems   Constitutional: Negative    Negative for chills and fever    HENT: Positive for ear pain and sinus pressure  Eyes: Negative  Respiratory: Positive for cough  Cardiovascular: Negative  Gastrointestinal: Negative  Genitourinary: Negative  Musculoskeletal: Negative  Skin: Negative  Neurological: Negative  Psychiatric/Behavioral: Negative  Objective:      Pulse 77   Temp (!) 96 6 °F (35 9 °C) (Skin)   Ht 5' 6" (1 676 m)   LMP 08/15/2022 (Approximate)   SpO2 97%   BMI 37 01 kg/m²          Physical Exam  Constitutional:       General: She is not in acute distress  Appearance: She is not ill-appearing  HENT:      Head: Normocephalic and atraumatic  Right Ear: Tympanic membrane normal       Left Ear: Tympanic membrane normal       Mouth/Throat:      Mouth: Mucous membranes are moist       Pharynx: No oropharyngeal exudate or posterior oropharyngeal erythema  Eyes:      General:         Right eye: No discharge  Left eye: No discharge  Extraocular Movements: Extraocular movements intact  Pupils: Pupils are equal, round, and reactive to light  Cardiovascular:      Rate and Rhythm: Normal rate  Pulmonary:      Effort: Pulmonary effort is normal  No respiratory distress  Breath sounds: No wheezing  Musculoskeletal:      Cervical back: Normal range of motion  Neurological:      General: No focal deficit present  Mental Status: She is alert     Psychiatric:         Mood and Affect: Mood normal          Behavior: Behavior normal

## 2022-09-12 NOTE — ASSESSMENT & PLAN NOTE
- Stable; continue lexapro 15mg daily and hydroxyzine 25mg QHS PRN in conjunction with therapy sessions    - Follow up in 3 months

## 2022-09-12 NOTE — PROGRESS NOTES
BMI Counseling: Body mass index is 37 01 kg/m²  The BMI is above normal  Nutrition recommendations include decreasing overall calorie intake, 3-5 servings of fruits/vegetables daily, consuming healthier snacks, moderation in carbohydrate intake, increasing intake of lean protein and reducing intake of saturated fat and trans fat  Exercise recommendations include moderate aerobic physical activity for 150 minutes/week

## 2022-09-12 NOTE — ASSESSMENT & PLAN NOTE
- Patient may continue to use over the counter medications and I have also recommended the use of  neti pot for sinus pain/pressure  Given length of symptoms I will also start patient on a 5 day course of Azithromycin; she will call with any new or worsening symptoms

## 2022-10-15 DIAGNOSIS — F41.9 ANXIETY: ICD-10-CM

## 2022-10-17 RX ORDER — HYDROXYZINE HYDROCHLORIDE 25 MG/1
25 TABLET, FILM COATED ORAL
Qty: 90 TABLET | Refills: 0 | Status: SHIPPED | OUTPATIENT
Start: 2022-10-17

## 2022-10-22 DIAGNOSIS — F32.A DEPRESSION, UNSPECIFIED DEPRESSION TYPE: ICD-10-CM

## 2022-10-22 NOTE — TELEPHONE ENCOUNTER
Pt called, requesting  10 day supply the following that was selected, to prevent her from running out until medication comes in the mail   Pt mentioned only having enough until Monday night

## 2022-10-24 DIAGNOSIS — F32.A DEPRESSION, UNSPECIFIED DEPRESSION TYPE: ICD-10-CM

## 2022-10-24 DIAGNOSIS — J45.40 MODERATE PERSISTENT ASTHMA WITHOUT COMPLICATION: ICD-10-CM

## 2022-10-24 RX ORDER — ESCITALOPRAM OXALATE 10 MG/1
10 TABLET ORAL DAILY
Qty: 90 TABLET | Refills: 0 | Status: SHIPPED | OUTPATIENT
Start: 2022-10-24 | End: 2022-10-24 | Stop reason: SDUPTHER

## 2022-10-24 RX ORDER — ESCITALOPRAM OXALATE 5 MG/1
5 TABLET ORAL DAILY
Qty: 90 TABLET | Refills: 0 | Status: SHIPPED | OUTPATIENT
Start: 2022-10-24

## 2022-10-24 RX ORDER — ESCITALOPRAM OXALATE 10 MG/1
10 TABLET ORAL DAILY
Qty: 90 TABLET | Refills: 0 | Status: SHIPPED | OUTPATIENT
Start: 2022-10-24

## 2022-10-24 RX ORDER — ESCITALOPRAM OXALATE 5 MG/1
5 TABLET ORAL DAILY
Qty: 90 TABLET | Refills: 0 | Status: SHIPPED | OUTPATIENT
Start: 2022-10-24 | End: 2022-10-24 | Stop reason: SDUPTHER

## 2022-10-24 RX ORDER — MONTELUKAST SODIUM 10 MG/1
10 TABLET ORAL
Qty: 90 TABLET | Refills: 0 | Status: SHIPPED | OUTPATIENT
Start: 2022-10-24

## 2022-11-29 ENCOUNTER — HOSPITAL ENCOUNTER (OUTPATIENT)
Dept: RADIOLOGY | Facility: HOSPITAL | Age: 42
Discharge: HOME | End: 2022-11-29

## 2022-11-29 DIAGNOSIS — Z91.89 INCREASED RISK OF BREAST CANCER: ICD-10-CM

## 2022-11-29 RX ADMIN — GADOBUTROL 10 ML: 604.72 INJECTION INTRAVENOUS at 18:43

## 2022-11-30 ENCOUNTER — TELEPHONE (OUTPATIENT)
Dept: OBGYN CLINIC | Facility: CLINIC | Age: 42
End: 2022-11-30

## 2022-11-30 DIAGNOSIS — N63.20 MASS OF LEFT BREAST, UNSPECIFIED QUADRANT: Primary | ICD-10-CM

## 2022-11-30 NOTE — TELEPHONE ENCOUNTER
Pt called in regards to results of Breast MRI  Pt states that test indicated that she will need a mri guided biopsy and follow-up mri of liver   Please advise

## 2022-12-01 NOTE — TELEPHONE ENCOUNTER
Pt needs to f/u with PCP regarding liver findings  As far as breast findings- yes she needs biopsy  Radiology usually arranges and should be receiving call from them today

## 2022-12-02 DIAGNOSIS — R16.0 LIVER MASSES: Primary | ICD-10-CM

## 2022-12-07 ENCOUNTER — TELEPHONE (OUTPATIENT)
Dept: OBGYN CLINIC | Facility: CLINIC | Age: 42
End: 2022-12-07

## 2022-12-07 NOTE — TELEPHONE ENCOUNTER
FYI - we received incoming fax requesting pre-cert for upcoming MRI of the breast ordered by Danis Castro  This is a [facility] patient

## 2022-12-07 NOTE — TELEPHONE ENCOUNTER
Has breast MRI biopsy scheduled due to abnormal breast MRI>   I imagine this needs pre-cert  Please initiate process

## 2022-12-11 ENCOUNTER — HOSPITAL ENCOUNTER (OUTPATIENT)
Dept: CT IMAGING | Facility: HOSPITAL | Age: 42
Discharge: HOME | End: 2022-12-11
Attending: FAMILY MEDICINE

## 2022-12-11 DIAGNOSIS — R16.0 LIVER MASSES: ICD-10-CM

## 2022-12-11 RX ADMIN — IOHEXOL 100 ML: 350 INJECTION, SOLUTION INTRAVENOUS at 15:41

## 2022-12-12 NOTE — PROGRESS NOTES
Spoke with patient via telephone regarding recommendation for;        _____ RIGHT ___x___LEFT        _____Ultrasound guided  ____Stereotactic  Breast biopsy___x__MRI Guided Biopsy        __x___Verbalized understanding         Blood thinners:  _____yes ___x__no     Date stopped: ____n/a_______     Biopsy teaching sheet reviewed with patient verbalizing understanding and questions/concerns addressed at this time:  ____x___yes ______no        Med Hx: Asthma; Anxiety; Seasonal Allergies;  Left Ankle Neuropathy

## 2022-12-14 ENCOUNTER — OFFICE VISIT (OUTPATIENT)
Dept: FAMILY MEDICINE CLINIC | Facility: CLINIC | Age: 42
End: 2022-12-14

## 2022-12-14 VITALS
HEIGHT: 66 IN | HEART RATE: 97 BPM | SYSTOLIC BLOOD PRESSURE: 128 MMHG | BODY MASS INDEX: 37.01 KG/M2 | OXYGEN SATURATION: 99 % | TEMPERATURE: 97.7 F | DIASTOLIC BLOOD PRESSURE: 74 MMHG

## 2022-12-14 DIAGNOSIS — F32.A DEPRESSION, UNSPECIFIED DEPRESSION TYPE: Primary | ICD-10-CM

## 2022-12-14 DIAGNOSIS — F41.9 ANXIETY: ICD-10-CM

## 2022-12-14 DIAGNOSIS — Z91.89 INCREASED RISK OF BREAST CANCER: ICD-10-CM

## 2022-12-14 DIAGNOSIS — J45.40 MODERATE PERSISTENT ASTHMA WITHOUT COMPLICATION: ICD-10-CM

## 2022-12-14 RX ORDER — MONTELUKAST SODIUM 10 MG/1
10 TABLET ORAL
Qty: 90 TABLET | Refills: 0 | Status: SHIPPED | OUTPATIENT
Start: 2022-12-14

## 2022-12-14 RX ORDER — HYDROXYZINE HYDROCHLORIDE 25 MG/1
25 TABLET, FILM COATED ORAL
Qty: 90 TABLET | Refills: 0 | Status: SHIPPED | OUTPATIENT
Start: 2022-12-14

## 2022-12-14 RX ORDER — PREGABALIN 50 MG/1
50 CAPSULE ORAL 3 TIMES DAILY
COMMUNITY
Start: 2022-11-22

## 2022-12-14 RX ORDER — ESCITALOPRAM OXALATE 10 MG/1
10 TABLET ORAL DAILY
Qty: 90 TABLET | Refills: 0 | Status: SHIPPED | OUTPATIENT
Start: 2022-12-14

## 2022-12-14 RX ORDER — ESCITALOPRAM OXALATE 5 MG/1
5 TABLET ORAL DAILY
Qty: 90 TABLET | Refills: 0 | Status: SHIPPED | OUTPATIENT
Start: 2022-12-14

## 2022-12-14 NOTE — PROGRESS NOTES
Assessment/Plan:    Depression  - Stable; continue current regimen of Lexapro 15mg daily and hydroxyzine 25mg QHS PRN    Asthma  - Stable; continue Singulair 10 mg QHS and albuterol PRN       Diagnoses and all orders for this visit:    Depression, unspecified depression type  -     escitalopram (LEXAPRO) 10 mg tablet; Take 1 tablet (10 mg total) by mouth daily  -     escitalopram (LEXAPRO) 5 mg tablet; Take 1 tablet (5 mg total) by mouth daily    Anxiety  -     hydrOXYzine HCL (ATARAX) 25 mg tablet; Take 1 tablet (25 mg total) by mouth daily at bedtime as needed for anxiety    Moderate persistent asthma without complication  -     montelukast (SINGULAIR) 10 mg tablet; Take 1 tablet (10 mg total) by mouth daily at bedtime    Increased risk of breast cancer  -     Ambulatory Referral to Oncology Genetics; Future    Other orders  -     pregabalin (LYRICA) 50 mg capsule; Take 50 mg by mouth 3 (three) times a day          Subjective:      Patient ID: Anahi Crisostomo is a 38 y o  female  HPI  Anahi Crisostomo is a pleasant 38year old female who presents today for a follow up  Patient recently had a breast MRI which showed a mass of the left breast concerning for malignancy  She is scheduled for a breast biopsy 12/23  Incidentally there were some liver lesions which were also noted with recommendations for a CT to further characterize these lesions  Patient had the CT and is awaiting results  Due to her strong family history of breast cancer patient is requesting a referral for genetic counseling  The following portions of the patient's history were reviewed and updated as appropriate: allergies, current medications, past family history, past medical history, past social history, past surgical history and problem list     Review of Systems   Constitutional: Negative  HENT: Negative  Eyes: Negative  Respiratory: Negative  Cardiovascular: Negative  Gastrointestinal: Negative  Genitourinary: Negative  Musculoskeletal: Negative  Skin: Negative  Neurological: Negative  Psychiatric/Behavioral: Negative  Objective:      /74 (BP Location: Left arm, Patient Position: Sitting, Cuff Size: Standard)   Pulse 97   Temp 97 7 °F (36 5 °C) (Temporal)   Ht 5' 6" (1 676 m)   SpO2 99%   BMI 37 01 kg/m²          Physical Exam  Constitutional:       General: She is not in acute distress  Appearance: She is not ill-appearing  HENT:      Head: Normocephalic and atraumatic  Mouth/Throat:      Mouth: Mucous membranes are moist       Pharynx: No oropharyngeal exudate or posterior oropharyngeal erythema  Eyes:      General:         Right eye: No discharge  Left eye: No discharge  Extraocular Movements: Extraocular movements intact  Pupils: Pupils are equal, round, and reactive to light  Cardiovascular:      Rate and Rhythm: Normal rate  Pulmonary:      Effort: Pulmonary effort is normal  No respiratory distress  Musculoskeletal:      Cervical back: Normal range of motion  Lymphadenopathy:      Cervical: No cervical adenopathy  Neurological:      General: No focal deficit present  Mental Status: She is alert     Psychiatric:         Mood and Affect: Mood normal          Behavior: Behavior normal

## 2022-12-15 ENCOUNTER — TELEPHONE (OUTPATIENT)
Dept: GENETICS | Facility: CLINIC | Age: 42
End: 2022-12-15

## 2022-12-15 NOTE — TELEPHONE ENCOUNTER
I called Maura to schedule a new patient appointment with the Cancer [facility]etics [facility]       Outcome:  Genetics appointment scheduled for April 26, 2023 at 9:30 am  Patient was added to the wait list  Encouraged patient to call our office if biopsy results were abnormal for a possible sooner appointment  Personal/Family History Related to Appointment:  Patient reports no personal hx of cancer, schedule biopsy of breast on the 12/23  Family hx of breast cancer, metastatic breast cancer, renal cancer, amyloidosis  Patient's maternal great grandmother was [employer]       History of Genetic Testing:  Patient reports no personal or family history of genetic testing    Genetics Family History Questionnaire:  I confirmed the patient's e-mail on file as the best e-mail to send an invite link for our genetics family history intake

## 2022-12-23 ENCOUNTER — HOSPITAL ENCOUNTER (OUTPATIENT)
Dept: RADIOLOGY | Facility: HOSPITAL | Age: 42
Discharge: HOME | End: 2022-12-23

## 2022-12-23 DIAGNOSIS — N63.20 MASS OF LEFT BREAST, UNSPECIFIED QUADRANT: ICD-10-CM

## 2022-12-23 RX ORDER — LIDOCAINE HYDROCHLORIDE AND EPINEPHRINE BITARTRATE 20; .01 MG/ML; MG/ML
20 INJECTION, SOLUTION SUBCUTANEOUS ONCE
Status: COMPLETED | OUTPATIENT
Start: 2022-12-23 | End: 2022-12-23

## 2022-12-23 RX ORDER — LIDOCAINE HYDROCHLORIDE 10 MG/ML
5 INJECTION, SOLUTION EPIDURAL; INFILTRATION; INTRACAUDAL; PERINEURAL ONCE
Status: COMPLETED | OUTPATIENT
Start: 2022-12-23 | End: 2022-12-23

## 2022-12-23 RX ADMIN — GADOBUTROL 10 ML: 604.72 INJECTION INTRAVENOUS at 08:25

## 2022-12-23 RX ADMIN — LIDOCAINE HYDROCHLORIDE 5 ML: 10 INJECTION, SOLUTION EPIDURAL; INFILTRATION; INTRACAUDAL; PERINEURAL at 08:45

## 2022-12-23 RX ADMIN — LIDOCAINE HYDROCHLORIDE,EPINEPHRINE BITARTRATE 20 ML: 20; .01 INJECTION, SOLUTION INFILTRATION; PERINEURAL at 08:45

## 2022-12-27 NOTE — PROGRESS NOTES
Post procedure call completed    Bleeding: _____yes __x___no    Pain: _____yes __x____no    Redness/Swelling: ______yes ___x___no    Band aid removed: __x___yes _____no    Steri-Strips intact: __x____yes _____no

## 2022-12-30 ENCOUNTER — TELEPHONE (OUTPATIENT)
Dept: MAMMOGRAPHY | Facility: CLINIC | Age: 42
End: 2022-12-30

## 2023-02-07 ENCOUNTER — TELEMEDICINE (OUTPATIENT)
Dept: FAMILY MEDICINE CLINIC | Facility: CLINIC | Age: 43
End: 2023-02-07

## 2023-02-07 VITALS — TEMPERATURE: 99 F

## 2023-02-07 DIAGNOSIS — U07.1 COVID-19: Primary | ICD-10-CM

## 2023-02-07 RX ORDER — NIRMATRELVIR AND RITONAVIR 300-100 MG
3 KIT ORAL 2 TIMES DAILY
Qty: 30 TABLET | Refills: 0 | Status: SHIPPED | OUTPATIENT
Start: 2023-02-07 | End: 2023-02-12

## 2023-02-07 NOTE — PROGRESS NOTES
COVID-19 Outpatient Progress Note    Assessment/Plan:    Problem List Items Addressed This Visit    None  Visit Diagnoses     COVID-19    -  Primary    Relevant Medications    nirmatrelvir & ritonavir (Paxlovid, 300/100,) tablet therapy pack         Disposition:     Patient has asymptomatic or mild COVID-19 infection  Based off CDC guidelines, they were recommended to isolate for 5 days  If they are asymptomatic or symptoms are improving with no fevers in the past 24 hours, isolation may be ended followed by 5 days of wearing a mask when around othes to minimize risk of infecting others  If still have a fever or other symptoms have not improved, continue to isolate until they improve  Regardless of when they end isolation, avoid being around people who are more likely to get very sick from COVID-19 until at least day 11  Discussed symptom directed medication options with patient  Patient meets criteria for PAXLOVID and they have been counseled appropriately according to EUA documentation released by the FDA  After discussion, patient agrees to treatment  Glenora Mangle is an investigational medicine used to treat mild-to-moderate COVID-19 in adults and children (15years of age and older weighing at least 80 pounds (40 kg)) with positive results of direct SARS-CoV-2 viral testing, and who are at high risk for progression to severe COVID-19, including hospitalization or death  PAXLOVID is investigational because it is still being studied  There is limited information about the safety and effectiveness of using PAXLOVID to treat people with mild-to-moderate COVID-19      The FDA has authorized the emergency use of PAXLOVID for the treatment of mild-tomoderate COVID-19 in adults and children (15years of age and older weighing at least 80 pounds (40 kg)) with a positive test for the virus that causes COVID-19, and who are at high risk for progression to severe COVID-19, including hospitalization or death, under an EUA      What should I tell my healthcare provider before I take PAXLOVID? Tell your healthcare provider if you:  - Have any allergies  - Have liver or kidney disease  - Are pregnant or plan to become pregnant  - Are breastfeeding a child  - Have any serious illnesses    Tell your healthcare provider about all the medicines you take, including prescription and over-the-counter medicines, vitamins, and herbal supplements  Some medicines may interact with PAXLOVID and may cause serious side effects  Keep a list of your medicines to show your healthcare provider and pharmacist when you get a new medicine  You can ask your healthcare provider or pharmacist for a list of medicines that interact with PAXLOVID  Do not start taking a new medicine without telling your healthcare provider  Your healthcare provider can tell you if it is safe to take PAXLOVID with other medicines  Tell your healthcare provider if you are taking combined hormonal contraceptive  PAXLOVID may affect how your birth control pills work  Females who are able to become pregnant should use another effective alternative form of contraception or an additional barrier method of contraception  Talk to your healthcare provider if you have any questions about contraceptive methods that might be right for you  How do I take PAXLOVID? PAXLOVID consists of 2 medicines: nirmatrelvir and ritonavir  - Take 2 pink tablets of nirmatrelvir with 1 white tablet of ritonavir by mouth 2 times each day (in the morning and in the evening) for 5 days  For each dose, take all 3 tablets at the same time  - If you have kidney disease, talk to your healthcare provider  You may need a different dose  - Swallow the tablets whole  Do not chew, break, or crush the tablets  - Take PAXLOVID with or without food  - Do not stop taking PAXLOVID without talking to your healthcare provider, even if you feel better    - If you miss a dose of PAXLOVID within 8 hours of the time it is usually taken, take it as soon as you remember  If you miss a dose by more than 8 hours, skip the missed dose and take the next dose at your regular time  Do not take 2 doses of PAXLOVID at the same time  - If you take too much PAXLOVID, call your healthcare provider or go to the nearest hospital emergency room right away  - If you are taking a ritonavir- or cobicistat-containing medicine to treat hepatitis C or Human Immunodeficiency Virus (HIV), you should continue to take your medicine as prescribed by your healthcare provider   - Talk to your healthcare provider if you do not feel better or if you feel worse after 5 days  Who should generally not take PAXLOVID? Do not take PAXLOVID if:  You are allergic to nirmatrelvir, ritonavir, or any of the ingredients in PAXLOVID  You are taking any of the following medicines:  - Alfuzosin  - Pethidine, piroxicam, propoxyphene  - Ranolazine  - Amiodarone, dronedarone, flecainide, propafenone, quinidine  - Colchicine  - Lurasidone, pimozide, clozapine  - Dihydroergotamine, ergotamine, methylergonovine  - Lovastatin, simvastatin  - Sildenafil (Revatio®) for pulmonary arterial hypertension (PAH)  - Triazolam, oral midazolam  - Apalutamide  - Carbamazepine, phenobarbital, phenytoin  - Rifampin  - St  Gio’s Wort (hypericum perforatum)    What are the important possible side effects of PAXLOVID? Possible side effects of PAXLOVID are:  - Liver Problems  Tell your healthcare provider right away if you have any of these signs and symptoms of liver problems: loss of appetite, yellowing of your skin and the whites of eyes (jaundice), dark-colored urine, pale colored stools and itchy skin, stomach area (abdominal) pain  - Resistance to HIV Medicines  If you have untreated HIV infection, PAXLOVID may lead to some HIV medicines not working as well in the future    - Other possible side effects include: altered sense of taste, diarrhea, high blood pressure, or muscle aches    These are not all the possible side effects of PAXLOVID  Not many people have taken PAXLOVID  Serious and unexpected side effects may happen  Alex Sinks is still being studied, so it is possible that all of the risks are not known at this time  What other treatment choices are there? Like Guzman Foster may allow for the emergency use of other medicines to treat people with COVID-19  Go to https://Pulse.io/ for information on the emergency use of other medicines that are authorized by FDA to treat people with COVID-19  Your healthcare provider may talk with you about clinical trials for which you may be eligible  It is your choice to be treated or not to be treated with PAXLOVID  Should you decide not to receive it or for your child not to receive it, it will not change your standard medical care  What if I am pregnant or breastfeeding? There is no experience treating pregnant women or breastfeeding mothers with PAXLOVID  For a mother and unborn baby, the benefit of taking PAXLOVID may be greater than the risk from the treatment  If you are pregnant, discuss your options and specific situation with your healthcare provider  It is recommended that you use effective barrier contraception or do not have sexual activity while taking PAXLOVID  If you are breastfeeding, discuss your options and specific situation with your healthcare provider  How do I report side effects with PAXLOVID? Contact your healthcare provider if you have any side effects that bother you or do not go away  Report side effects to FDA MedWatch at www fda gov/medwatch or call 9-574-ENQ1494 or you can report side effects to [facility]  at the contact information provided below  Website Fax number Telephone number   DOZ [phone]     How should I store PAXLOVID? Store PAXLOVID tablets at room temperature between 68°F to 77°F (20°C to 25°C)  Full fact sheet for patients, parents, and caregivers can be found at: Navid lynn    I have spent 5 minutes directly with the patient  Greater than 50% of this time was spent in counseling/coordination of care regarding: risks and benefits of treatment options and instructions for management  Encounter provider: Brannon Byrd MD     Provider located at: [address] [place] [address]  [address] [address]  [id number] [place] [id number] [phone]     Recent Visits  No visits were found meeting these conditions  Showing recent visits within past 7 days and meeting all other requirements  Today's Visits  Date Type Provider Dept   02/07/23 Smiley Sosa MD [facility]   Showing today's visits and meeting all other requirements  Future Appointments  No visits were found meeting these conditions  Showing future appointments within next 150 days and meeting all other requirements     This virtual check-in was done via [address] and patient was informed that this is a secure, HIPAA-compliant platform  She agrees to proceed  Patient agrees to participate in a virtual check in via telephone or video visit instead of presenting to the office to address urgent/immediate medical needs  Patient is aware this is a billable service  She acknowledged consent and understanding of privacy and security of the video platform  The patient has agreed to participate and understands they can discontinue the visit at any time  After connecting through [facility], the patient was identified by name and date of birth  Sam Norman was informed that this was a telemedicine visit and that the exam was being conducted confidentially over secure lines  My office door was closed  No one else was in the room   Sam Norman acknowledged consent and understanding of privacy and security of the telemedicine visit  I informed the patient that I have reviewed her record in Epic and presented the opportunity for her to ask any questions regarding the visit today  The patient agreed to participate  Verification of patient location:  Patient is located in the following state in which I hold an active license: [place]    Subjective:   Mariela Mercer is a 38 y o  female who has been screened for COVID-19  Symptom change since last report: unchanged  Patient's symptoms include fever, chills, fatigue, nasal congestion, cough, myalgias and headache      - Date of symptom onset: 2/5/2023  - Date of positive COVID-19 test: 2/6/2023  Type of test: Home antigen  Patient with typical symptoms of COVID-19 and they attest that they were positive on home rapid antigen testing  Image of positive result is not able to be uploaded into their chart  COVID-19 vaccination status: Fully vaccinated with booster    Belkis Azucena has been staying home and has isolated themselves in her home  She is taking care to not share personal items and is cleaning all surfaces that are touched often, like counters, tabletops, and doorknobs using household cleaning sprays or wipes  She is wearing a mask when she leaves her room  Lab Results   Component Value Date    SARSCOV2 Not Detected 07/20/2020       Review of Systems   Constitutional: Positive for chills, fatigue and fever  HENT: Positive for congestion  Respiratory: Positive for cough  Musculoskeletal: Positive for myalgias  Neurological: Positive for headaches       Current Outpatient Medications on File Prior to Visit   Medication Sig   • albuterol (Ventolin HFA) 90 mcg/act inhaler Inhale 2 puffs every 6 (six) hours as needed for wheezing or shortness of breath   • cetirizine (ZyrTEC) 5 MG tablet Take 10 mg by mouth daily   • escitalopram (LEXAPRO) 10 mg tablet Take 1 tablet (10 mg total) by mouth daily   • escitalopram (LEXAPRO) 5 mg tablet Take 1 tablet (5 mg total) by mouth daily   • hydrOXYzine HCL (ATARAX) 25 mg tablet Take 1 tablet (25 mg total) by mouth daily at bedtime as needed for anxiety   • montelukast (SINGULAIR) 10 mg tablet Take 1 tablet (10 mg total) by mouth daily at bedtime   • [DISCONTINUED] pregabalin (LYRICA) 50 mg capsule Take 50 mg by mouth 3 (three) times a day (Patient not taking: Reported on 2/7/2023)       Objective:    Temp 99 °F (37 2 °C) (Temporal)      Physical Exam  Constitutional:       General: She is not in acute distress  Appearance: She is not ill-appearing  HENT:      Head: Normocephalic and atraumatic  Eyes:      General:         Right eye: No discharge  Left eye: No discharge  Extraocular Movements: Extraocular movements intact  Pulmonary:      Effort: No respiratory distress  Neurological:      General: No focal deficit present  Mental Status: She is alert     Psychiatric:         Mood and Affect: Mood normal          Behavior: Behavior normal        Carly Brice MD

## 2023-02-07 NOTE — LETTER
[facility]  [address]  [facility] [address] [facility]  [phone]  Dept: [phone]    February 7, 2023    Patient: Ambrosio Edouard  YOB: 1980    Ambrosio Edouard was seen for a telemedicine visit after testing positive for covid-19  Patient may return to work on 2/13/2023, as this is 5 days from the onset of symptoms  Upon return, they must then adhere to strict masking for an additional 5 days      Sincerely,    Shaenka Doran MD

## 2023-02-17 ENCOUNTER — APPOINTMENT (OUTPATIENT)
Dept: LAB | Facility: MEDICAL CENTER | Age: 43
End: 2023-02-17

## 2023-02-17 ENCOUNTER — CLINICAL SUPPORT (OUTPATIENT)
Dept: GENETICS | Facility: CLINIC | Age: 43
End: 2023-02-17

## 2023-02-17 DIAGNOSIS — Z80.3 FAMILY HISTORY OF BREAST CANCER: Primary | ICD-10-CM

## 2023-02-17 DIAGNOSIS — Z80.3 FAMILY HISTORY OF MALIGNANT NEOPLASM OF BREAST: ICD-10-CM

## 2023-02-17 NOTE — LETTER
2023     Charlie SungVeena [address] [facility]  [facility]   [address] [address]    Patient: Korina Briscoe  YOB: 1980  Date of Visit: 2023      Dear Dr Char Neely: Thank you for referring Korina Briscoe to me for evaluation  Below are my notes for this consultation  If you have questions, please do not hesitate to call me  I look forward to following your patient along with you  Sincerely,        Alecia Hogue        CC: Gerardo Lee MD        Pre-Test Genetic Counseling Consult Note    Patient Name: Korina Briscoe   /Age: [age] y o  Referring Provider: Gerardo Lee MD    Date of Service: 2023  Genetic Counselor: Alecia Hogue MS, [facility]  Interpretation Services: None  [facility]      Santos Coto was referred to the [address] [place] [address] and Genetic Assessment Program due to her family history of breast cancer  she presents today to discuss the possibility of a hereditary cancer syndrome, options for genetic testing, and implications for her and her family  Cancer History and Treatment:   Personal History: no personal history of cancer    Screening Hx:   Breast:  Breast Imagin22 mammo  Breast Density: Scattered fibroglandular density   Breast Imagin22 breast MRI  IMPRESSION:   1  There is a 4 mm mass in the left breast which demonstrate suspicious enhancement  MRI guided core needle biopsy is indicated  2  No MRI evidence of malignancy in the right breast   3  They are incompletely characterized liver lesions  Dedicated abdominal imaging is indicated  MRI or multiphase CT is recommended as there is possible enhancement within the findings  Breast Biopsy: 22  Final Diagnosis   A   Breast, "Left breast," Biopsy:  - Breast parenchyma with apocrine papillary metaplasia and columnar cell change  - Negative for carcinoma     Colon:  Colonoscopy: none    Gynecologic:  Ovaries and Uterus intact     Skin:  No current screening, self exams     Other screening: followed for thyroid nodules    Reproductive History  Age at menarche: 13  Age at first live birth: 38  Menopause: Premenopausal  Hormone replacement: none    Medical and Surgical History  Pertinent surgical history:   Past Surgical History:   Procedure Laterality Date   • ANKLE FRACTURE SURGERY Left    • CERVICAL BIOPSY  W/ LOOP ELECTRODE EXCISION     •  SECTION      low transverse   • COSMETIC SURGERY      Breast reduction   • KNEE SURGERY     • MRI BREAST BIOPSY LEFT (ALL INCLUSIVE) Left 2022   • REDUCTION MAMMAPLASTY     • ROTATOR CUFF REPAIR  2014   • TUBAL LIGATION        Pertinent medical history:  Past Medical History:   Diagnosis Date   • Allergic    • Allergic rhinitis    • Anxiety    • Arthritis    • Asthma    • Complex ovarian cyst     last assessed: 2014   • Condyloma acuminatum    • Depression    • Dysmenorrhea     last assessed: 2013   • Eating disorder 2020    in recovery BED   • H/O human papillomavirus infection    • Headache(784 0)    • History of ankle surgery    • Migraine    • Palpitations     last assessed: 2013   • TMJ dysfunction          Other History:  Height:   Ht Readings from Last 1 Encounters:   22 5' 6" (1 676 m)     Weight:   Wt Readings from Last 1 Encounters:   22 109 kg (240 lb)       Relevant Family History   Patient reports Maternal Ashkenazi Faith ancestry  Daughter (7) has a clinical and genetic diagnosis of NF2; she has multiple schwannomas, a newly diagnosed meningioma, hearing loss and vision loss; parental testing identified that her mutation was de my    Maternal Family History:   Mother (78) history of breast cancer (dx 39)  Grandfather (d 82) history of ureter cancer  Identical twin second cousins (54) both with history of breast cancer (dx 45s)    Paternal Family History:   Father (d 67) history of systemic amyloidosis (dx 59)   Aunt (67) history of breast cancer (dx 48)   Grandmother (d 40) history of breast cancer (dx 50)    Please refer to the scanned pedigree in the Media Tab for a complete family history     *All history is reported as provided by the patient; records are not available for review, except where indicated  Assessment:  We discussed sporadic, familial and hereditary cancer  We also discussed the many factors that influence our risk for cancer such as age, environmental exposures, lifestyle choices and family history  We reviewed the indications suggestive of a hereditary predisposition to cancer  Genetic testing is indicated for Maura based on the following criteria: Meets NCCN V2 2023 Testing Criteria for High-Penetrance Breast Cancer Susceptibility Genes: family history of a first-degree relative diagnosed with breast cancer under 48  Мария Lyles also meets critteria based on her paternal family history of breast cancer (second-degree relative diagnosed with breast cancer under 48)    The risks, benefits, and limitations of genetic testing were reviewed with the patient, as well as genetic discrimination laws, and possible test results (positive, negative, variants of uncertain significance) and their clinical implications  If positive for a mutation, options for managing cancer risk including increased surveillance, chemoprevention, and in some cases prophylactic surgery were discussed  Мария Lyles was informed that if a hereditary cancer syndrome was identified in her, first degree relatives (parents, siblings, and children) have a chance of also inheriting the condition  Genetic testing would allow for predictive genetic testing in other relatives, who may also be at risk depending on their degree of relation  Billing:  Most individuals pay <$100 for hereditary cancer genetic testing   If insurance covers the cost of the testing, individuals may still pay out of pocket secondary to co-pays, co-insurance, or deductibles  If the cost of the testing exceeds $100, the lab will reach out to the patient via phone or e-mail  The patient will then have the option to proceed with the testing, cancel the testing, or elect the self-pay option of $250  Maura verbalized understanding  Plan: Patient decided to proceed with testing and provided consent  Summary:     Sample Collection:  The patient was given a blood kit and instructed to go to a [facility]    Genetic Testing Preformed: [facility] Cancer Panel (47 genes): APC, TAMIE, AXIN2, BARD1, BMPR1A, BRCA1, BRCA2, BRIP1, CDH1, CDK4, CDKN2A, CHEK2, CTNNA1, DICER1, EPCAM, GREM1, HOXB13, KIT, MEN1, MLH1, MSH2, MSH3, MSH6, MUTYH, NBN, NF1, NTHL1, PALB2, PDGFRA, PMS2, POLD1, POLE, PTEN, RAD50, RAD51C, RAD51D, SDHA, SDHB, SDHC, SDHD, SMAD4, SMARCA4, STK11, TP53, TSC1, TSC2, VHL    Result Call Information:  I confirmed the patient's mobile number on file as the best number to call with results  I confirmed with the patient that we can leave a voicemail on the provided numbers    Results take approximately 2-3 weeks to complete once test is started  We will contact Maura once results are available  Additional recommendations for surveillance/medical management will be made pending genetic test results

## 2023-02-17 NOTE — PROGRESS NOTES
Pre-Test Genetic Counseling Consult Note    Patient Name: Juan C Curtis   /Age: [birth year]//49 y o  Referring Provider: Lesli Rhodes MD    Date of Service: 2023  Genetic Counselor: Gavin Ly MS, [facility]  Interpretation Services: None  [facility]      Daija Miller was referred to the [address] [place] [address] and Genetic Assessment Program due to her family history of breast cancer  she presents today to discuss the possibility of a hereditary cancer syndrome, options for genetic testing, and implications for her and her family  Cancer History and Treatment:   Personal History: no personal history of cancer    Screening Hx:   Breast:  Breast Imagin22 mammo  Breast Density: Scattered fibroglandular density   Breast Imagin22 breast MRI  IMPRESSION:   1  There is a 4 mm mass in the left breast which demonstrate suspicious enhancement  MRI guided core needle biopsy is indicated  2  No MRI evidence of malignancy in the right breast   3  They are incompletely characterized liver lesions  Dedicated abdominal imaging is indicated  MRI or multiphase CT is recommended as there is possible enhancement within the findings  Breast Biopsy: 22  Final Diagnosis   A   Breast, "Left breast," Biopsy:  - Breast parenchyma with apocrine papillary metaplasia and columnar cell change  - Negative for carcinoma     Colon:  Colonoscopy: none    Gynecologic:  Ovaries and Uterus intact     Skin:  No current screening, self exams     Other screening: followed for thyroid nodules    Reproductive History  Age at menarche: 13  Age at first live birth: 38  Menopause: Premenopausal  Hormone replacement: none    Medical and Surgical History  Pertinent surgical history:   Past Surgical History:   Procedure Laterality Date   • ANKLE FRACTURE SURGERY Left    • CERVICAL BIOPSY  W/ LOOP ELECTRODE EXCISION     •  SECTION      low transverse   • COSMETIC SURGERY      Breast reduction   • KNEE SURGERY     • MRI BREAST BIOPSY LEFT (ALL INCLUSIVE) Left 12/23/2022   • REDUCTION MAMMAPLASTY  2008   • ROTATOR CUFF REPAIR  11/28/2014   • TUBAL LIGATION        Pertinent medical history:  Past Medical History:   Diagnosis Date   • Allergic    • Allergic rhinitis    • Anxiety    • Arthritis 2016   • Asthma    • Complex ovarian cyst     last assessed: 7/9/2014   • Condyloma acuminatum    • Depression    • Dysmenorrhea     last assessed: 9/20/2013   • Eating disorder September 2020    in recovery BED   • H/O human papillomavirus infection    • Headache(784 0)    • History of ankle surgery    • Migraine    • Palpitations     last assessed: 12/20/2013   • TMJ dysfunction          Other History:  Height:   Ht Readings from Last 1 Encounters:   12/14/22 5' 6" (1 676 m)     Weight:   Wt Readings from Last 1 Encounters:   12/23/22 109 kg (240 lb)       Relevant Family History   Patient reports Maternal Ashkenazi Buddhist ancestry  Daughter (7) has a clinical and genetic diagnosis of NF2; she has multiple schwannomas, a newly diagnosed meningioma, hearing loss and vision loss; parental testing identified that her mutation was de my    Maternal Family History: Mother (78) history of breast cancer (dx 39)  Grandfather (d 82) history of ureter cancer  Identical twin second cousins (54) both with history of breast cancer (dx 45s)    Paternal Family History:   Father (d 67) history of systemic amyloidosis (dx 59)   Aunt (79) history of breast cancer (dx 48)   Grandmother (d 40) history of breast cancer (dx 48)    Please refer to the scanned pedigree in the Media Tab for a complete family history     *All history is reported as provided by the patient; records are not available for review, except where indicated  Assessment:  We discussed sporadic, familial and hereditary cancer    We also discussed the many factors that influence our risk for cancer such as age, environmental exposures, lifestyle choices and family history  We reviewed the indications suggestive of a hereditary predisposition to cancer  Genetic testing is indicated for Maura based on the following criteria: Meets NCCN V2 2023 Testing Criteria for High-Penetrance Breast Cancer Susceptibility Genes: family history of a first-degree relative diagnosed with breast cancer under 48  Мария Lyles also meets critteria based on her paternal family history of breast cancer (second-degree relative diagnosed with breast cancer under 48)    The risks, benefits, and limitations of genetic testing were reviewed with the patient, as well as genetic discrimination laws, and possible test results (positive, negative, variants of uncertain significance) and their clinical implications  If positive for a mutation, options for managing cancer risk including increased surveillance, chemoprevention, and in some cases prophylactic surgery were discussed  Мария Lyles was informed that if a hereditary cancer syndrome was identified in her, first degree relatives (parents, siblings, and children) have a chance of also inheriting the condition  Genetic testing would allow for predictive genetic testing in other relatives, who may also be at risk depending on their degree of relation  Billing:  Most individuals pay <$100 for hereditary cancer genetic testing  If insurance covers the cost of the testing, individuals may still pay out of pocket secondary to co-pays, co-insurance, or deductibles  If the cost of the testing exceeds $100, the lab will reach out to the patient via phone or e-mail  The patient will then have the option to proceed with the testing, cancel the testing, or elect the self-pay option of $250  Maura verbalized understanding  Plan: Patient decided to proceed with testing and provided consent      Summary:     Sample Collection:  The patient was given a blood kit and instructed to go to a [facility] lab    Genetic Testing Preformed: [facility] Cancer Panel (47 genes): APC, TAMIE, AXIN2, BARD1, BMPR1A, BRCA1, BRCA2, BRIP1, CDH1, CDK4, CDKN2A, CHEK2, CTNNA1, DICER1, EPCAM, GREM1, HOXB13, KIT, MEN1, MLH1, MSH2, MSH3, MSH6, MUTYH, NBN, NF1, NTHL1, PALB2, PDGFRA, PMS2, POLD1, POLE, PTEN, RAD50, RAD51C, RAD51D, SDHA, SDHB, SDHC, SDHD, SMAD4, SMARCA4, STK11, TP53, TSC1, TSC2, VHL    Result Call Information:  I confirmed the patient's mobile number on file as the best number to call with results  I confirmed with the patient that we can leave a voicemail on the provided numbers    Results take approximately 2-3 weeks to complete once test is started  We will contact [facility] once results are available  Additional recommendations for surveillance/medical management will be made pending genetic test results

## 2023-03-07 ENCOUNTER — HOSPITAL ENCOUNTER (OUTPATIENT)
Dept: ULTRASOUND IMAGING | Facility: MEDICAL CENTER | Age: 43
Discharge: HOME | End: 2023-03-07

## 2023-03-07 DIAGNOSIS — E03.9 HYPOTHYROIDISM, UNSPECIFIED TYPE: ICD-10-CM

## 2023-03-07 DIAGNOSIS — E04.9 ENLARGED THYROID GLAND: ICD-10-CM

## 2023-03-09 ENCOUNTER — TELEPHONE (OUTPATIENT)
Dept: GENETICS | Facility: CLINIC | Age: 43
End: 2023-03-09

## 2023-03-09 NOTE — TELEPHONE ENCOUNTER
Post-Test Genetic Counseling Consult Note  Today I spoke with Elidia Braun over the phone to review the results of her genetic test for hereditary cancer  We met previously on 2/17 for pre-test counseling  A copy of this consult note and genetic test result will be shared with the patient  SUMMARY:    Test(s): [facility] Cancer Panel +RNA (47 genes): APC, TAMIE, AXIN2, BARD1, BMPR1A, BRCA1, BRCA2, BRIP1, CDH1, CDK4, CDKN2A, CHEK2, CTNNA1, DICER1, EPCAM, GREM1, HOXB13, KIT, MEN1, MLH1, MSH2, MSH3, MSH6, MUTYH, NBN, NF1, NTHL1, PALB2, PDGFRA, PMS2, POLD1, POLE, PTEN, RAD50, RAD51C, RAD51D, SDHA, SDHB, SDHC, SDHD, SMAD4, SMARCA4, STK11, TP53, TSC1, TSC2, VHL    Result: Negative - No Clinically Significant Variants Detected      Assessment:   A negative result significantly reduces the likelihood that Elidia Braun has a hereditary cancer syndrome  However, this testing is unable to completely rule out the presence of hereditary cancer  It remains possible that:  - There is a variant in an area of a gene which was not tested or there is a variant not detectable due to technical limitations of this test      - There is a variant in another gene that was not included in this test or in a gene not known to be linked to cancer or tumors  - A family member has a genetic variant that the patient did not inherit  - The cancer in the family is sporadic and is related to non-hereditary factors  Risk based on Family History:    Carmelinas risk of breast cancer may still be increased based on her personal risk factors and family history  Despite a negative test result, we are able to run risk models to better estimate Maura's lifetime risk of developing breast cancer   I ran three risk models based on the information Elidia Braun provided during our pre-test counseling session:    Fidelinaer-Ganesh model: Estimated lifetime risk, to age 80, for breast cancer is 34 1% compared to approximately 10 4% general population risk Leighton Sep model:Estimated lifetime risk, to age 80, for breast cancer is 22 2% compared to approximately 12 2% general population risk     Ken tables: Estimated lifetime risk, to age 78, for breast cancer is 18 4%    The Tyrer-Cuzick model and Leighton Sep model predict that Carmelinas lifetime risk for breast cancer is at least 20% therefore she qualifies for increased breast cancer screening, which is outlined below in the plan section  Risks and Testing for Family Members:    Despite a negative result, Maura's first-degree relatives may be at increased risk for the cancers based on the family history  We recommend they discuss screening and management recommendations with their healthcare providers  At this time we do not recommend testing for Madonna Hager 's daughter based on her negative test result  Madonna Hager 's daughter still needs to consider the history of cancer on the other side of her family when determining her risks  If Madonna Hager has any affected family members with a cancer diagnosis, especially at a young age, they may still consider genetic testing  Relatives who wish to pursue genetic testing can reach out to the [address] [place] [address] Road ([id number]) to schedule an appointment or visit www [facility] org to identify a local genetic counselor  Additional Information:  A healthy lifestyle will improve overall health and reduce risk for illness  Eating a healthy diet and exercising for 4 hours per week is recommended  Both diet and exercise have been shown to help maintain a healthy weight  Postmenopausal women who are overweight are at higher risk for breast cancer  Moderate to heavy alcohol use can increase the risk for some cancers  Smoking cigarettes can also increase risk for breast, lung, prostate, pancreatic and other cancers        Plan:   Recommendations for Madonna Hager are outlined below based on her family history, however the surveillance and medical management should continue as clinically indicated and as determined appropriate by her healthcare providers  Breast cancer screening per the NCCN Breast Cancer Risk Reduction V 2023 and Breast Cancer Screening and Diagnosis Guideline v1 2022    Screening for women who have a lifetime risk of >20% as defined by models that are largely dependent on family history:  • Breast awareness  • Clinical encounter every 6-12 months   • Consider referral to a breast specialist   • Annual screening mammogram   Geronimo Huntley is recommended if available beginning 10 years prior to the youngest family member but not less than age 27 years  • Annual breast MRI to begin 10 years prior to the youngest family member but not less than age 22 years  o Consider whole breast ultrasound or contrast-enhanced mammography for those who qualify for but cannot undergo MRI  • Consider risk reduction strategies  Options for risk reduction may include risk-reducing medications, risk-reducing surgery, participation in clinical research and healthy lifestyle choices  The options for risk reduction should be discussed with a breast specialist or other healthcare provider able to explain the benefits and risks of these options  Maura's OBGYN is currently ordering mammograms and breast MRIs for her  Negative Result: Dennie Mark was strongly encouraged to contact us regarding any changes in her personal or family history of cancer as these changes could alter our recommendation regarding genetic testing and/or cancer screening

## 2023-03-10 ENCOUNTER — TELEPHONE (OUTPATIENT)
Dept: GENETICS | Facility: CLINIC | Age: 43
End: 2023-03-10

## 2023-03-10 NOTE — TELEPHONE ENCOUNTER
----- Message from Mayi Muse sent at 3/9/2023  2:03 PM EST -----  Regarding: complete  GC Completed Chart     Please send [employer] message with lab result and result note

## 2023-04-25 ENCOUNTER — OFFICE VISIT (OUTPATIENT)
Dept: FAMILY MEDICINE CLINIC | Facility: CLINIC | Age: 43
End: 2023-04-25

## 2023-04-25 VITALS
HEART RATE: 87 BPM | BODY MASS INDEX: 38.74 KG/M2 | OXYGEN SATURATION: 99 % | HEIGHT: 66 IN | SYSTOLIC BLOOD PRESSURE: 118 MMHG | DIASTOLIC BLOOD PRESSURE: 78 MMHG | TEMPERATURE: 97 F

## 2023-04-25 DIAGNOSIS — F33.1 MODERATE EPISODE OF RECURRENT MAJOR DEPRESSIVE DISORDER (HCC): ICD-10-CM

## 2023-04-25 PROBLEM — J40 BRONCHITIS: Status: RESOLVED | Noted: 2019-12-03 | Resolved: 2023-04-25

## 2023-04-25 RX ORDER — ESCITALOPRAM OXALATE 20 MG/1
20 TABLET ORAL DAILY
Qty: 90 TABLET | Refills: 1 | Status: SHIPPED | OUTPATIENT
Start: 2023-04-25

## 2023-04-25 NOTE — PROGRESS NOTES
Assessment/Plan:    No problem-specific Assessment & Plan notes found for this encounter  Diagnoses and all orders for this visit:    Moderate episode of recurrent major depressive disorder (HCC)  -     escitalopram (LEXAPRO) 20 mg tablet; Take 1 tablet (20 mg total) by mouth daily        Discussed increasing Lexparo to 20 mg daily which she is in agreement with at this time  She will continue weekly therapy sessions  No active suicidal ideations Patient admits to present to the Emergency department immediately should she start to feel suicidal  Follow up in 4 weeks  Subjective:      Patient ID: Arash Haynes is a 38 y o  female  HPI    Drew Dallas is a pleasant 38year old female who presents today to discuss her depression  Over the past 3 weeks she has been feeling increasingly more depressed and also admits to passive suicidal thoughts  She is not actively suicidal and does not endorse a plan  She does admit to being under a lot of stress at home  Her step son also struggles with mental health issues and her [de-identified] year old also has Neurofibromatosis type 2  She is currently on Lexparo 15 mg daily which had been working well for her but she feels as though it may need to be increased  She does have a therapist whom she sees weekly  She was seen by her therapist yesterday and will be seeing her again today  The following portions of the patient's history were reviewed and updated as appropriate: allergies, current medications, past family history, past medical history, past social history, past surgical history and problem list     Review of Systems   Constitutional: Negative  HENT: Negative  Eyes: Negative  Respiratory: Negative  Cardiovascular: Negative  Gastrointestinal: Negative  Genitourinary: Negative  Musculoskeletal: Negative  Skin: Negative  Neurological: Negative  Psychiatric/Behavioral: Positive for dysphoric mood and suicidal ideas   Negative for "self-injury  Objective:      /78 (BP Location: Left arm, Patient Position: Sitting, Cuff Size: Large)   Pulse 87   Temp (!) 97 °F (36 1 °C) (Temporal)   Ht 5' 6\" (1 676 m)   SpO2 99%   BMI 38 74 kg/m²          Physical Exam  Constitutional:       General: She is not in acute distress  Appearance: She is not ill-appearing  HENT:      Head: Normocephalic and atraumatic  Eyes:      General:         Right eye: No discharge  Left eye: No discharge  Extraocular Movements: Extraocular movements intact  Pupils: Pupils are equal, round, and reactive to light  Pulmonary:      Effort: No respiratory distress  Neurological:      General: No focal deficit present  Mental Status: She is alert  Psychiatric:         Mood and Affect: Mood is depressed  Affect is tearful  Speech: Speech normal          Behavior: Behavior normal          Thought Content: Thought content normal  Thought content does not include homicidal or suicidal plan           "

## 2023-05-01 ENCOUNTER — OFFICE VISIT (OUTPATIENT)
Dept: PSYCHOLOGY | Facility: CLINIC | Age: 43
End: 2023-05-01

## 2023-05-01 ENCOUNTER — OFFICE VISIT (OUTPATIENT)
Dept: PSYCHIATRY | Facility: CLINIC | Age: 43
End: 2023-05-01

## 2023-05-01 DIAGNOSIS — F33.1 MAJOR DEPRESSIVE DISORDER, RECURRENT, MODERATE (HCC): Primary | ICD-10-CM

## 2023-05-01 NOTE — PSYCH
Subjective:     Patient ID: Rafia Baron is a 38 y o  female and uses she/her pronouns     [facility] Clinical Progress Notes      Specialized Services Documentation  Therapist must complete separate progress note for each specific clinical activity in which the individual participated during the day  Other:     Pre-Authorization: Submitted Authorization through [facility] for [employer] - Requested 10 PHP days from 05/01/2023 through 05/12/2023  Message received: Your request for [id number] has been submitted and is pending review  You will receive notification of a determination or if additional information is required  Brianne Paulino  Case Management Note    Fabienne Paulino     Current suicide risk : Low    ([id number]) Met with Rafia Baron at [facility]  Reviewed program, initial paperwork reviewed: Consent for Treatment, PHP handbook, HIPPA, General Consent, Client Bill of Rights, and Smoking/Drug and Alcohol Policy  Release of Information obtained for emergency contact - Chilango Mccain ([phone]), and PCP and Health Care Coordination Form  Rafia Baron declined hard copies of paperwork and verbally gave consent  Reviewed and given on call number  PCP notified of admission and health care coordination form sent  Completed initial psycho-social evaluation and initial treatment goals discussed  Medications changes/added/denied? No - See Dr Sadie Eisenmenger Note     Treatment session number: Assessment only    Individual Case Management Visit provided today?  yes    [facility] follow up physician's orders: Admit to PHP - See Dr Magnus Cage's note

## 2023-05-01 NOTE — PSYCH
Subjective:     Patient ID: Dunia Shepard is a 38 y o  female  [facility] Clinical Progress Notes      Specialized Services Documentation  Therapist must complete separate progress note for each specific clinical activity in which the individual participated during the day  Group Psychotherapy 0930-1030     This group consisted of roughly 40 minutes which focused on the science/benefits of yoga (with a chair yoga practice included) and 20 minutes of learning the science behind/practicing Emotional Freedom Tapping  The EFT discussion began with a conversation focusing around the understanding the science behind emotional freedom tapping and in which ways it can help improve lives (sleep, stress, diet, etc)  We then discussed the technique in detail and practiced it multiple times as a group  Following reflecting on the EFT practice, the group then began discussion on the research-based benefits of yoga  We then practiced chair yoga as a group  The yoga practice was also followed by a period of reflection/discussion  Dunia Shepard participated by engaging in both practices, sharing her past experience with acupuncture (and it's efficacy), and by taking notes  Sheila Law continues to work on treatment goals  Continue psychotherapy groups to encourage further exploration of needs, personal awareness, and skills       Tx Plan Objective: 1 1,1 2, 1 4   Therapist: Manuela Solomon MS
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
## 2023-05-01 NOTE — BH TREATMENT PLAN
"Assessment/Plan:      Diagnoses and all orders for this visit:    Major depressive disorder, recurrent, moderate ([facility] [address] )      [facility] Treatment Plan     AREAS OF NEED: Depression as evidenced by feelings of worthlessness, hopelessness, intrusive thoughts with suicidal ideations (no plan or intent), decrease in motivation, energy,  lack of concentration and exhaustion, due to familial stressors, complicated grief, and general mental health that has impacted Maura Wong's ability to cope and sit with distressing feelings     Date Initiated: 05/01/23    Strengths: \"I'm really good at my job,\" \"I'm generally a good problem solver,\" \"I'm a good mom,\" and \"organized  \"      LONG TERM GOAL:   Date Initiated: 05/01/23  1 0 While at [facility], I will participate and engage in individual and group therapy through skill building, self-reflection, mindfulness, resource building, and psychoeducation to help me handle daily stressors in healthier ways and enhance my quality of life  Target Date: 05/29/2023   Completion Date:       SHORT TERM OBJECTIVES:     Date Initiated: 05/01/23  1 1 When I am unsure what feeling I am identifying with, I will use the Emotions Feeling wheel to identify the feeling I identify with most, name the feeling out loud, allow the feeling sensations in my body, mindfully investigate the source of this feeling, and bring compassion to my experience  Revision Date:   Target Date: 05/10/2023   Completion Date:     Date Initiated: 05/01/23  1 2 When I notice I am having distressing body image thoughts, I will use positive affirmations that I have identified that are true to me to challenge negative thinking and increase self-empowerment  (Ex: I am proud of who I am, and I carry myself with confidence and zach, My body is my ally, not my enemy, Comparison is the thief of [place]  Instead I choose to be grateful for me, My body is unique and beautiful, and I celebrate it every day)         To believe in " my affirmations:          A  Visualize the affirmation          B  Use Positive self-talk         C  Practice Gratitude          D  Celebrate Progress         E  Surround yourself with positivity   Revision Date:   Target Date: 05/10/2023  Completion Date:    Date Initiated: 05/01/23  1 3 I will take medications as prescribed and share questions and concerns if arise  Revision Date:  Target Date: 05/10/2023  Completion Date:     Date Initiated: 05/01/23  1 4 I will identify 3 ways my supports can assist in my recovery and agree to use them as needed  Revision Date:  Target Date: 05/10/2023  Completion Date:          7 DAY REVISION:    Date Initiated:  Revision Date:   Target Date:   Completion Date:      PSYCHIATRY:  Date Initiated:  05/01/23  Medication Management and Education      Revision Date:       The person(s) responsible for carrying out the plan is Lizzeth Barrios MD    NURSING/SYMPTOM EDUCATION:  Date Initiated: 05/01/23       1 1, 1 2  1 3, 1 4 Provide wellness/symptoms and skill education groups three to five days weekly to educate Mare Kendrick on signs and symptoms of diagnoses, skills to manage stressors, and medication questions that will be addressed by the treatment team         Revision date: The person(s) responsible for carrying out the plan is Uvaldo Cramer MS & Richi Jones [place]     PSYCHOLOGY:   Date Initiated: 05/01/23       1 1, 1 2, 1 4 Provide psychotherapy group 5 times per week to allow opportunity for Mare Kendrick  to explore stressors and ways of coping  Revision Date:   The person(s) responsible for carrying out the plan is Fabienne Jefferson [place]     ALLIED THERAPY:   Date Initiated: 05/01/23  1 1,1 2 Engage Maura Wong in AT group 5 times daily to encourage development and use of wellness tools to decrease symptoms and promote recovery through meaningful activity    Revision Date:       The person(s) responsible for carrying out the plan is Rachell BLAKE MccainBC / MARCELO Dumont     CASE MANAGEMENT:   Date Initiated: 05/01/23      1 0 This  will meet with Jeaneth Merrill  3-4 times weekly to assess treatment progress, discharge planning, connection to community supports and UR as indicated  Revision Date:   The person(s) responsible for carrying out the plan is MARIELA Balderrama     TREATMENT REVIEW/COMMENTS:     DISCHARGE CRITERIA: Identify 3 signs of progress and complete relapse prevention plan  DISCHARGE PLAN: Connect with identified outpatient providers  Estimated Length of Stay: 10 treatment days      CLIENT COMMENTS / Please share your thoughts, feelings, need and/or experiences regarding your treatment plan with Staff  Please see follow up note with comments  Signatures can be found on [facility] Treatment plan consent form

## 2023-05-01 NOTE — PSYCH
"Subjective:     Patient ID: Adam Cornejo is a 38 y o  female  [facility] Clinical Progress Notes      Specialized Services Documentation  Therapist must complete separate progress note for each specific clinical activity in which the individual participated during the day  Allied Therapy  [id number] Group practiced \"What\" skills of mindfulness (Observe, Describe, Participate) and Wise Mind through interacting with music and art  Each client observed a picture while listening to music, deciding first if the music matched their picture  Next they described why the music did or did not match by discussing and identifying observed facts, as well as emotions and assumptions of both mediums  Therapist encouraged clients to identify when they used logical and emotional mind in their explanations  Group members then participated in the experience by changing the picture to match the music based on their discussions  Therapist emphasized that in real life, clients will need to focus on what they can change about their approach to move through situations that are out of their control  Therapist then discussed real life examples of handling emotional reactions using \"What\" Skills and Sandeep   Adam Cornejo was not present for AT due to initial intake  Continue AT for development and practice of \"What\" skills for mindfulness      Tx Objectives: 1 1, 1 2, 1 4  Therapist: LUPE Bro     "

## 2023-05-01 NOTE — PSYCH
"Subjective:    Patient ID: Nadiya Clifford is a 38 y o  female      [facility] Clinical Progress Notes      Specialized Services Documentation  Therapist must complete separate progress note for each specific clinical activity in which the individual participated during the day  Education Therapy   [id number]  Nadiya Clifford was excused due to intake during check in and goal review  [id number]  Samanta Wong engaged throughout the treatment day  Was engaged in learning related to Illness, Medication, Aftercare and Wellness Tools  Staff utilized Verbal, Written, A/V and Demonstration teaching methods  Nadiya Clifford shared area of learning and set a goal for outside of program to take her first, cleared, post-op run of one mile, and walk her dogs  Tx Plan Objective: 1 1, 1 2, 1 4, Therapist: Macho Celestin    Group Psychotherapy  [id number] Nadiya Clifford participated actively in a psychotherapy group focused on the stages of DBT by creating a personal \"DBT house  \" Participants were supplied with blank paper, drawing utensils, and an example house; then were instructed to draw a house and fill in several elements with things such as values, areas for growth, supports, ways to regulate, etc  Group followed multi-step directions, engaged in discussion, and reflected on their wellness journey  Nadiya Clifford created and walt a boat as her house  Jolly Deal shared that she has a lot of things she wants to work on and things that she hides  She shared that what she hides include all of her feelings  Continue to note progress towards goals  Continue with psychotherapy to further develop DBT skills and personal insight    Tx Plan Objective: 1 1, 1 2, 1 4 Therapist: ERICA Celestin    "

## 2023-05-02 ENCOUNTER — OFFICE VISIT (OUTPATIENT)
Dept: PSYCHOLOGY | Facility: CLINIC | Age: 43
End: 2023-05-02

## 2023-05-02 DIAGNOSIS — F33.1 MAJOR DEPRESSIVE DISORDER, RECURRENT, MODERATE (HCC): Primary | ICD-10-CM

## 2023-05-02 NOTE — PSYCH
Subjective:     Patient ID: Luis Yang is a 38 y o  female  [facility] Clinical Progress Notes      Specialized Services Documentation  Therapist must complete separate progress note for each specific clinical activity in which the individual participated during the day  Allied Therapy  [id number]   Clients listened, discussed, and added to a song that focused on regaining hope  Clients identified lyrics that represented hopelessness, a turning point, and hope  Therapist used answers and song lyrics to discuss autonomy and agency, defining them respectively as having control over oneself/decisions and directing the autonomy to a specific purpose  Therapist prompted group to add to the song by identifying when they feel hopeless, what it feels like physically and emotionally, why they came to program, and what they have gained from program so far  Therapist used answers to illustrate Dillan Gills Mind, and prompted clients to identify where they saw it within their own answers  Luis Yang participated consistently throughout the session in small and large group discussions as well as adding to the song lyrics  Maura Wong received positive feedback and appreciation from peers when she shared that growth is a never-ending process, but that definition does not equate to failure  Continue AT for development and practice of autonomy and agency      Tx Objectives: 1 1, 1 2, 1 4                   Therapist: LUPE Rodrigez

## 2023-05-02 NOTE — PSYCH
Subjective:     Patient ID: Betsy Powell 38 y o  Female    [facility] Clinical Progress Notes      Specialized Services Documentation  Therapist must complete separate progress note for each specific clinical activity in which the individual participated during the day  Case Management Note    MARIELA Dennis     Current suicide risk : Low     A case management session is not scheduled today with Betsy Powell ; additionally, they did not request a CM meeting  Betsy Powell is aware of next scheduled 1:1     Medications changes/added/denied? No     Treatment session number: 2    Individual Case Management Visit provided today?  No    [facility] follow up physician's orders: None at this time

## 2023-05-02 NOTE — PSYCH
Subjective:    Patient ID: Yared Del Real is a 38 y o  female      [facility] Clinical Progress Notes      Specialized Services Documentation  Therapist must complete separate progress note for each specific clinical activity in which the individual participated during the day  Education Therapy   1744-0906  Yared Del Real participated actively in shared in check in and goal review  Presented as receptive related to readiness to learn  Yared Del Real  did complete goal from last treatment day identifying gaining responsibility and self-support  did not present with any barriers to learning  3900-3524  [place] Marion Wong engaged throughout the treatment day  Was engaged in learning related to Illness, Medication, Aftercare and Wellness Tools  Staff utilized Verbal, Written, A/V and Demonstration teaching methods  Yared Del Real shared area of learning and set a goal for outside of program to get her clothes / donation / storage sorted out so that she can get to her closet, and work on a knitting project  Tx Plan Objective: 1 1, 1 2, 1 4, Therapist: Linda Landeros [place]    Group Psychotherapy  8561-3661 Yared Del Real participated actively in a psychotherapy group focused on values  The group  was provided with a large list of values, members identified which resonated with them, categorized those into 1-5 groups based on self-identified similarity, then selected one value within each group that encapsulated the entire group  Then using the selected values, participants added a verb to each value to see what it looks like as an actionable core value (e g  live in freedom, act with mindfulness, etc )  Art supplies were provided, including: various beads of differing types/colors, colorful strings, as well as different elastic   The group was given creative freedom to make something (key chains, bracelets, etc ) using a self-decided symbolic system that was representative to their personal life as related to their identified values  [address] [facility] shared the core values and accompanying action statements with facilitator  Marbin Korin also shared that she made a key-chain with her daughter's nickname on it, because her daughter is what pushes and motivates her to come to program and better herself  Continue to note progress towards goals  Continue with psychotherapy to encourage further value-alignment based progress     Tx Plan Objective 1 1, 1 2, 1 4 Therapist: ERICA Muñoz

## 2023-05-02 NOTE — PSYCH
Subjective:     Patient ID: Karoline Montoya 38 y o  Female    [facility] Clinical Progress Notes      Specialized Services Documentation  Therapist must complete separate progress note for each specific clinical activity in which the individual participated during the day  Other:     Pre-Authorization: Spoke with Hugo Cuevas at [facility] - phone: [phone]  This was a f/u call from the [facility] request  Per Beverley Christensen  No Calixto Oliveira is required for [facility] LOC  Call Reference # [phone], [facility]  Therapist: Shawna Cooper

## 2023-05-03 ENCOUNTER — OFFICE VISIT (OUTPATIENT)
Dept: PSYCHOLOGY | Facility: CLINIC | Age: 43
End: 2023-05-03

## 2023-05-03 DIAGNOSIS — F33.1 MAJOR DEPRESSIVE DISORDER, RECURRENT, MODERATE (HCC): Primary | ICD-10-CM

## 2023-05-03 NOTE — PSYCH
"Subjective:     Patient ID: Rafia Baron is a 38 y o  female  [facility] Clinical Progress Notes      Specialized Services Documentation  Therapist must complete separate progress note for each specific clinical activity in which the individual participated during the day  Allied Therapy  2043-0595 Group members were prompted to contribute a song that describes their current mental health experience  Discussion covered ways to understand and approach grieving friends, tools to handle anxiety and racing thoughts, self-advocacy, and when to reach out for support  Group members were challenged to think of ways they can continue advocating for their needs, as well as to identify tools to approach anxiety and grief  Group was also challenged to consider open curiosity in the midst of panic by avoiding asking \"why\", and instead asking, \"what\", \"when\", or \"how\"  Rafia Baron participated actively in discussion by providing personal interpretations and showing interest in a peer's experience by asking follow up questions  Rafia Baron was pulled for case management about [facility] through the session for the remainder of the time  Continue AT for the development and practice of recognizing and processing different areas of mental health wellness  Tx Objectives: 1 1, 1 2, 1 4  Therapist: Franceen Hammans, MTI     Education Therapy   3465-5267 Rafia Baron actively shared in morning assessment and goal review  Presented as Receptive related to readiness to learn  Rafia Baron did complete goal from last treatment day identifying gaining responsibility  did not present with any barriers to learning  4514-9202 Camelia Wong engaged throughout the treatment day  Was engaged in learning related to Illness, Medication, Aftercare and Wellness Tools  Staff utilized Verbal, Written, A/V and Demonstration teaching methods    Maura Wong shared area of learning and set a goal for outside of program to " unload , start a sewing project        Tx Objectives: 1 1, 1 2, 1 4  Therapist: LUPE Tubbs

## 2023-05-03 NOTE — PSYCH
Subjective:    Patient ID: Rafia Baron is a 38 y o  female      [facility] Clinical Progress Notes      Specialized Services Documentation  Therapist must complete separate progress note for each specific clinical activity in which the individual participated during the day  Group Psychotherapy  0930-1030 Rafia Baron participated actively in a psychotherapy group focused on understanding communication  The group began with time to independently respond to provided prompts which guided participants to reflect on their motivation for improving their communication skills; members voluntarily shared after  From sharing, discussion arose from topics such as: the brain's reward/response system, developmental considerations for communication styles, identifying and voicing personal needs, etc  Members were encouraged to engage in discussions and ask questions throughout  Rafia Baron shared her written reflection, and a personal struggle which evolved into a group discussion, involving an inability to know what her needs are due to a lifetime of putting others' needs above hers  Continue to note progress towards goals  Continue with psychotherapy to encourage further practice of healthy communication    Tx Plan Objective 1 1, 1 2, 1 4  Therapist: ERICA Ugalde

## 2023-05-03 NOTE — PSYCH
Subjective:     Patient ID: Jacquelyn Cali is a 38 y o  female  [facility] Clinical Progress Notes      Specialized Services Documentation  Therapist must complete separate progress note for each specific clinical activity in which the individual participated during the day  Psychotherapeutic Group ([id number])    The group learned about the causes of dissatisfaction and using gratitude as an antidote  The group gained a further understanding of feelings of dissatisfaction through a short video and discussions  They identified solutions to these feelings by doing writing exercises, practice gratitude journal, and discussion  The group also learned new skills to achieve a larger sense of gratitude  Jacquelyn Cali actively participated in group by watching the video, contributing in discussions, writing, reading a [employer] gratitude blessing, and reviewing a worksheet  Jacquelyn Cali participated in the group by promoting discussion, reflecting on gratitude/guilt, and completed the exercises  Lissa Dow  is working towards goals  Continue psychotherapy groups to encourage further exploration of needs, personal awareness, and skills        Tx Plan Objective: 1 1, 1 2, 1 4 Therapist: Essence Chiang MS

## 2023-05-04 ENCOUNTER — OFFICE VISIT (OUTPATIENT)
Dept: PSYCHOLOGY | Facility: CLINIC | Age: 43
End: 2023-05-04

## 2023-05-04 DIAGNOSIS — F33.1 MAJOR DEPRESSIVE DISORDER, RECURRENT, MODERATE (HCC): Primary | ICD-10-CM

## 2023-05-04 NOTE — PSYCH
Subjective:     Patient ID: Miriam Roberts is a 38 y o  female  [facility] Clinical Progress Notes      Specialized Services Documentation  Therapist must complete separate progress note for each specific clinical activity in which the individual participated during the day  Allied Therapy  7330-5745 Group planned to continue song share from the previous day which covered topics such as trauma responses, wise mind, HEARS, emotion identification, and coping skills  Group changed topics when one member suffered a panic attack  Group was led in a variety of mindful coping skills such as drawing breath patterns, visualization, affirmations, stretching, rainbow technique, and naming items of a preferred category that corresponded to letters of the alphabet  Miriam Roberts actively participated in lyrics analysis discussion, and was observed providing support and advocating for a peer who displayed panic symptoms  Miriam Roberts prompted peer to engage in mindfulness strategies while waiting for a   Miriam Roberts participated in mindfulness strategies led by , but was also observed continuing to provide support for peer throughout the session  Continue AT for development and practice of different strategies for mental health wellness      Tx Objectives: 1 1, 1 2, 1 4  Therapist: LUPE Friedman

## 2023-05-04 NOTE — PSYCH
"Subjective:    Patient ID: Dunia Shepard is a 38 y o  female      [facility] Clinical Progress Notes      Specialized Services Documentation  Therapist must complete separate progress note for each specific clinical activity in which the individual participated during the day  Education Therapy   2606-9697  Dunia Shepard participated actively in shared in check in and goal review  Presented as receptive related to readiness to learn  Dunia Shepard  did complete goal from last treatment day identifying gaining advocacy, responsibility and support  did not present with any barriers to learning  3549-7455  Sheila Wong engaged throughout the treatment day  Was engaged in learning related to Illness, Medication, Aftercare and Wellness Tools  Staff utilized Verbal, Written, A/V and Demonstration teaching methods  Dunia Shepard shared area of learning and set a goal for outside of program to do 30 minutes of barefoot yoga, and finish her sewing project  Tx Plan Objective: 1 1, 1 2, 1 4, Therapist: Macho Gardner    Group Psychotherapy  7062-7776 Dunia Shepard actively participated in a psychotherapy group focused on self-compassion  The group engaged in open discussion throughout the session which related in different ways to self-compassion  The group was provided with a variety of affirmation cards and asked to pick out one to hold on to  Participants were encouraged to write theirs on the board and share aloud with the group  The group was provided with magazines, glue sticks, and scissors and given time and instruction to find, cut out, and stick to some form of paper -- either provided paper or paper from a personal notebook etc  The materials were monitored and collected during and following activity usage  Group members were encouraged to share what they came up with   Dunia Shepard shared her affirmation of \"It's ok to not know what I need,\" and her collage choice of a multi-tool which " represents being complex in a series of ways  Continue to note progress towards goals  Continue with psychotherapy to encourage further value-alignment based progress     Tx Plan Objective 1 1, 1 2, 1 4 Therapist: ERICA Morales

## 2023-05-04 NOTE — PSYCH
Subjective:     Patient ID: Maxi Malcolm is a 38 y o  female  [facility] Clinical Progress Notes      Specialized Services Documentation  Therapist must complete separate progress note for each specific clinical activity in which the individual participated during the day  Group Psychoeducation  6372-8488 Per group members' request, this psychotherapy group reviewed various coping skill strategies that could be helpful for their mental health  The coping skills utilized were: TIPP, coping skill boxes, and HALT skill The group learned the skills via handouts and engaged in a paired muscle relaxation exercise as well as identified 3 items they would place in their coping box if they were to create one  Maxi Malcolm participated in group discussion and presented as receptive as observed by her taking notes during group  Continue psychotherapy groups to learn various coping skills to promote wellness and alleviate symptoms     Tx objectives: 1 1, 1 2, 1 4      Therapist: MARCELO Rasheed

## 2023-05-04 NOTE — PSYCH
Subjective:     Patient ID: Dunia Shepard 38 y o  Female    [facility] Clinical Progress Notes      Specialized Services Documentation  Therapist must complete separate progress note for each specific clinical activity in which the individual participated during the day  Case Management Note    MARIELA Madsen     Current suicide risk : Low     A case management session is not scheduled today with Dunia Shepard ; additionally, they did not request a CM meeting  Dunia Shepard is aware of next scheduled 1:1     Medications changes/added/denied? No     Treatment session number: 4    Individual Case Management Visit provided today?  No    [facility] follow up physician's orders: None at this time

## 2023-05-05 ENCOUNTER — OFFICE VISIT (OUTPATIENT)
Dept: PSYCHOLOGY | Facility: CLINIC | Age: 43
End: 2023-05-05

## 2023-05-05 DIAGNOSIS — F33.1 MAJOR DEPRESSIVE DISORDER, RECURRENT, MODERATE (HCC): Primary | ICD-10-CM

## 2023-05-05 NOTE — PSYCH
Visit Time    Visit Start Time: 0930  Visit Stop Time: 7657  Total Visit Duration: 1 hour    Subjective:     Patient ID: Kim Viera is a 38 y o  y o  female  [facility] Clinical Progress Notes      Specialized Services Documentation  Therapist must complete separate progress note for each specific clinical activity in which the individual participated during the day  Psychotherapy Group   Olaf Wong actively shared in psychotherapy group exploring DBT distress tolerance crisis survival strategies  Group explored crisis survival strategies ACCEPTS, TIP, and STOP) reinforcing actions one could take to learn to tolerate stressful experiences, thoughts and urges  Emperatriz Currie participated in [place] examples, square breathing, and maico fill in exercise to explore several strategies  She felt she could put effort into practicing the STOP skill  Some progress toward goal noted  Continue group to encourage learning, practice, and home practice of skills to manage distress     Tx Plan Objective: 1 1, Therapist:  Leslie ROBERTSON & Tristin Mcnulty RN

## 2023-05-05 NOTE — PSYCH
"    Subjective:     Patient ID: Nadiya Clifford is a 38 y o  female  [facility] Clinical Progress Notes      Specialized Services Documentation  Therapist must complete separate progress note for each specific clinical activity in which the individual participated during the day  Allied Therapy  4863-6388- Group was led in a mindfulness grounding exercise to begin the session  Group members were then prompted to write down a song that describes their current mental health experience  Songs discussed contained themes of:     A) Avoiding toxic positivity and using dialectic statements to validate emotions and shift unhealthy thought patterns to more effective thoughts  B) Growth not being a linear journey  C) How to identify early warning signs of mental health  D) What the difference between surviving vs thriving self looks like, feels like, and what actions they take-> identify what small steps one can take in that journey  E) What \"magnets\" draw us back in our wellness journey? What do we need to add or remove in order to be successful in our mental health journeys? F) If measuring the intensity of the trigger on a scale of 1 to 10, does my emotional reaction match?      Nadiya Clifford participated in group discussion and became tearful  The one question regarding home and she identified that home used to be a place where she wanted to be but that it has turned unsafe  Group reflected on how we can make ourselves feel safe when the world around us feels unsafe  Continue AT for the development and practice of recognizing and processing different areas of mental health wellness       Tx Objectives: 1 1, 1 2, 1 4                 Therapists: MARCELO Horan        "

## 2023-05-05 NOTE — PSYCH
Subjective:    Patient ID: Shannon Bowman is a 38 y o  female      [facility] Clinical Progress Notes      Specialized Services Documentation  Therapist must complete separate progress note for each specific clinical activity in which the individual participated during the day  Education Therapy   6443-0730  Shannon Bowman participated actively in shared in check in and goal review  Presented as receptive related to readiness to learn  Shannon Bowman  did complete goal from last treatment day identifying gaining hope, responsibility and support  did not present with any barriers to learning  5019-1966  Ashkan Wong engaged throughout the treatment day  Was engaged in learning related to Illness, Medication, Aftercare and Wellness Tools  Staff utilized Verbal, Written, A/V and Demonstration teaching methods  Shannon Bowman shared area of learning and set a goal for outside of program to start a new talat project, pick a journaling prompt, and get out of the house with her daughter  Tx Plan Objective: 1 1, 1 2, 1 4, Therapist: Fransisca Nevarez [place]    Group Psychotherapy  1480-0564 Shannon Bowman participated actively in the wellness assessment, which evaluates progress on several different areas of wellness/wellbeing: physical, emotional, cognitive, vocational, social and spiritual  Clients rated their progress and discussed areas that need work  By completing and discussing areas of progress and challenges, members are connected and reminded that, in their mental health struggle, they are not alone  Topics of discussion revolved around positive experiences within each area of wellness as well as the challenging aspects to wellness within their past week  Shannon Bowman shared that she would like to improve upon setting limits with others  Continue to note progress towards goals   Continue with psychotherapy to encourage the development and practice of reflecting on their mental health journey to alleviate symptoms and support wellness    Tx Plan Objective 1 1, 1 2, 1 4 Therapist: ERICA Yoo

## 2023-05-05 NOTE — PSYCH
Subjective:     Patient ID: Rigoberto Mercado 38 y o  Female    [facility] Clinical Progress Notes      Specialized Services Documentation  Therapist must complete separate progress note for each specific clinical activity in which the individual participated during the day  Case Management Note    MARIELA Velasco     Current suicide risk : Low     A case management session is not scheduled today with Rigoberto Mercado ; additionally, they did not request a CM meeting  Rigoberto Mercado is aware of next scheduled 1:1     Medications changes/added/denied? No     Treatment session number: 5    Individual Case Management Visit provided today?  No    [facility] follow up physician's orders: None at this time

## 2023-05-08 ENCOUNTER — OFFICE VISIT (OUTPATIENT)
Dept: PSYCHIATRY | Facility: CLINIC | Age: 43
End: 2023-05-08

## 2023-05-08 ENCOUNTER — OFFICE VISIT (OUTPATIENT)
Dept: PSYCHOLOGY | Facility: CLINIC | Age: 43
End: 2023-05-08

## 2023-05-08 DIAGNOSIS — F33.1 MAJOR DEPRESSIVE DISORDER, RECURRENT, MODERATE (HCC): Primary | ICD-10-CM

## 2023-05-08 DIAGNOSIS — F50.81 BINGE EATING DISORDER: ICD-10-CM

## 2023-05-08 DIAGNOSIS — F41.9 ANXIETY: ICD-10-CM

## 2023-05-08 NOTE — PSYCH
Subjective:     Patient ID: Jayesh Dial is a 38 y o  female  [facility] Clinical Progress Notes      Specialized Services Documentation  Therapist must complete separate progress note for each specific clinical activity in which the individual participated during the day  Allied Therapy   9900-1510-  Jayesh Dial actively shared in [place] group focused on learning what a vision board is and creating their own  Group explored the importance of vision boards, how to create a SMART goal, and determining their first action step to accomplishing something on their vision board  Group explored vision boards through group discussion, group interaction, visual arts, and goal setting  Jayesh Dial shared within her small group a life worth living means overcoming her eating disorder and redefining what a healthy self-image is for her  Some effort noted toward treatment goal   Continue AT to encourage the development and practice of creating vision boards to decrease symptoms and support wellness    Tx Plan Objective: 1 1, 1 2, 1 4   Therapist:  MARCELO Espitia

## 2023-05-08 NOTE — PSYCH
"Subjective:    Patient ID: Marlo Lawrence is a 38 y o  female      [facility] Clinical Progress Notes      Specialized Services Documentation  Therapist must complete separate progress note for each specific clinical activity in which the individual participated during the day  Group Psychotherapy  8536-6709 Marlo Lawrence participated actively in a psychotherapy group focused on connecting personal growth to the five key facets of wellness (HEARS)  The group received explanations of the five facets: hope, education, advocacy, responsibility, and (self) support  The group was supplied with a corresponding writing prompt, which focused on how attending (a voluntary) program speaks to the individual choosing to show up  A third document was provided entitled \"work in progress,\" which contained space to respond, in writing or drawing, to prompts such as \"something I'm learning to accept,\" \"something I'm learning about myself,\" etc  Art and writing supplies and blank lined paper were provided  Open discussion and sharing was encouraged as appropriate throughout the group  Marlo Lawrence shared that she is working on asking for help, and opened a group discussion on asking for support outside of program  Continue to note progress towards goals  Continue with psychotherapy to further encourage connection to wellness     Tx Plan Objective 1 1, 1 2, 1 4 Therapist: ERICA Bradley     "

## 2023-05-08 NOTE — PSYCH
"PHP MEDICATION MANAGEMENT NOTE        [address]    Name and Date of Birth:  Marlo Lawrence 38 y o  1980 MRN: [id number]    Date of Visit: May 8, 2023    Allergies   Allergen Reactions   • Pollen Extract Allergic Rhinitis   • Codeine      Other reaction(s): Unknown Reaction   • Morphine      Other reaction(s): Other (See Comments)   • Demerol [Meperidine] Rash   • Other      Annotation - [id number]: mushroom       Visit Time    Visit Start Time: 1000  Visit Stop Time: 1020  Total Visit Duration: 20 minutes    SUBJECTIVE:    Reina Frost is seen today for a follow up for Major Depressive Disorder and anxiety  She continues to improve gradually since beginning PHP  She reports improvements in depression since increase of Lexapro 2 weeks ago  She reports she has gained insight and learning new skills in partial program   She reports she continues with passive death wish, but no suicidal thoughts, plan or intent  Continues with intrusive, derogatory thoughts; but decreased in intensity and frequency  Sleep varies and uses hydroxyzine \"occasionally\"  She denies any side effects from current psychiatric medications  PLAN:  Continue psychiatric medications the same as noted below    Aware of 24 hour and weekend coverage for urgent situations accessed by calling [facility] main practice number  Continue partial hospitalization program    Diagnoses and all orders for this visit:    Major depressive disorder, recurrent, moderate (HCC)    Binge eating disorder    Anxiety        Current Outpatient Medications on File Prior to Visit   Medication Sig Dispense Refill   • albuterol (Ventolin HFA) 90 mcg/act inhaler Inhale 2 puffs every 6 (six) hours as needed for wheezing or shortness of breath 18 g 1   • cetirizine (ZyrTEC) 5 MG tablet Take 10 mg by mouth daily     • escitalopram (LEXAPRO) 20 mg tablet Take 1 tablet (20 mg total) by mouth daily 90 " tablet 1   • hydrOXYzine HCL (ATARAX) 25 mg tablet Take 1 tablet (25 mg total) by mouth daily at bedtime as needed for anxiety 90 tablet 0   • montelukast (SINGULAIR) 10 mg tablet Take 1 tablet (10 mg total) by mouth daily at bedtime 90 tablet 1     No current facility-administered medications on file prior to visit  Psychotherapy Provided:     Individual psychotherapy provided: Supportive counseling provided  Medications, treatment progress and treatment plan reviewed with Maura  Reassurance and supportive therapy provided  HPI ROS Appetite Changes and Sleep:     She reports fluctuating sleep pattern, fluctuating appetite, fluctuating energy levels   Denies homicidal ideation, denies suicidal ideation    Review Of Systems:      General decreased functioning   Personality no change in personality   Constitutional fever   ENT negative   Cardiovascular negative   Respiratory negative   Gastrointestinal negative   Genitourinary negative   Musculoskeletal negative   Integumentary negative   Neurological negative   Endocrine negative   Other Symptoms none, all other systems are negative     Mental Status Evaluation:    Appearance Adequate hygiene and grooming   Behavior calm and cooperative   Mood depressed   Speech Normal rate and volume   Affect mood-congruent and Flat   Thought Processes Goal directed and coherent   Thought Content Does not verbalize delusional material   Associations Tightly connected   Perceptual Disturbances Denies hallucinations and does not appear to be responding to internal stimuli   Risk Potential Suicidal/Homicidal Ideation - No evidence of suicidal or homicidal ideation and patient does not verbalize suicidal or homicidal ideation  Risk of Violence - No evidence of risk for violence found on assessment  Risk of Self Mutilation - No evidence of risk for self mutilation found on assessment   Orientation oriented to person, place, time/date and situation   Memory recent and remote memory grossly intact   Consciousness alert and awake   Attention/Concentration attention span and concentration are age appropriate   Insight improving   Judgement improving   Muscle Strength and Gait normal muscle strength and normal muscle tone, normal gait/station and normal balance   Motor Activity no abnormal movements   Language no difficulty naming common objects, no difficulty repeating a phrase, no difficulty writing a sentence   Fund of Knowledge adequate knowledge of current events  adequate fund of knowledge regarding past history  adequate fund of knowledge regarding vocabulary      Past Psychiatric History Update:     Inpatient Psychiatric Admission Since Last Encounter:   no  Suicide Attempt Or Self Mutilation Since Last Encounter:   no  Incidence of Violent Behavior Since Last Encounter:   no    Traumatic History Update:     New Onset of Abuse Since Last Encounter:   no  Traumatic Events Since Last Encounter:   no    Past Medical History:    Past Medical History:   Diagnosis Date   • Allergic    • Allergic rhinitis    • Anxiety    • Arthritis    • Asthma    • Complex ovarian cyst     last assessed: 2014   • Condyloma acuminatum    • Depression    • Dysmenorrhea     last assessed: 2013   • Eating disorder 2020    in recovery BED   • H/O human papillomavirus infection    • Headache(784 0)    • History of ankle surgery    • Migraine    • Palpitations     last assessed: 2013   • TMJ dysfunction         Past Surgical History:   Procedure Laterality Date   • ANKLE FRACTURE SURGERY Left    • CERVICAL BIOPSY  W/ LOOP ELECTRODE EXCISION     •  SECTION      low transverse   • COSMETIC SURGERY      Breast reduction   • KNEE SURGERY     • MRI BREAST BIOPSY LEFT (ALL INCLUSIVE) Left 2022   • REDUCTION MAMMAPLASTY     • ROTATOR CUFF REPAIR  2014   • TUBAL LIGATION       Allergies   Allergen Reactions   • Pollen Extract Allergic Rhinitis   • Codeine Other reaction(s): Unknown Reaction   • Morphine      Other reaction(s):  Other (See Comments)   • Demerol [Meperidine] Rash   • Other      Annotation - [id number]: mushroom     Substance Abuse History:    Social History     Substance and Sexual Activity   Alcohol Use No     Social History     Substance and Sexual Activity   Drug Use No     Social History:    Social History     Socioeconomic History   • Marital status: /Civil Union     Spouse name: Not on file   • Number of children: Not on file   • Years of education: Not on file   • Highest education level: Not on file   Occupational History   • Not on file   Tobacco Use   • Smoking status: Former     Packs/day: 0 50     Years: 10 00     Pack years: 5 00     Types: Cigarettes     Quit date: 2013     Years since quittin 9   • Smokeless tobacco: Never   • Tobacco comments:     former smoker (as per allscripts)   Vaping Use   • Vaping Use: Never used   Substance and Sexual Activity   • Alcohol use: No   • Drug use: No   • Sexual activity: Yes     Partners: Male     Birth control/protection: Female Sterilization   Other Topics Concern   • Not on file   Social History Narrative    Always uses seat belt    Denied: history of daily caffeinated coffee consumption    Exercises strenuously less than 3 times a week     Social Determinants of Health     Financial Resource Strain: Not on file   Food Insecurity: Not on file   Transportation Needs: Not on file   Physical Activity: Not on file   Stress: Not on file   Social Connections: Not on file   Intimate Partner Violence: Not on file   Housing Stability: Not on file     Family Psychiatric History:     Family History   Problem Relation Age of Onset   • Breast cancer Mother         diagnosed at 37   • Cancer Mother         Breast Cancer   • Cancer Father         Amyloidosis   • Asthma Father    • Breast cancer Paternal Grandmother         diagnosed, death early 45s   • Cancer Paternal Grandmother Breast cancer   • Breast cancer Paternal Aunt 46   • Cancer Maternal Grandfather         ureter/Kidney   • No Known Problems Daughter    • No Known Problems Paternal Aunt      History Review: The following portions of the patient's history were reviewed and updated as appropriate: allergies, current medications, past family history, past medical history, past social history, past surgical history and problem list     OBJECTIVE:     Vital signs in last 24 hours: There were no vitals filed for this visit  Laboratory Results: I have personally reviewed all pertinent laboratory/tests results  Medications Risks/Benefits:      Risks, Benefits And Possible Side Effects Of Medications:    Discussed risks and benefits of treatment with patient including risk of suicidality, serotonin syndrome, increased QTc interval and SIADH related to treatment with antidepressants;  Risk of induction of manic symptoms in certain patient populations     Controlled Medication Discussion:     Not applicable - controlled prescriptions are not prescribed by this practice    RODRICK Echeverria 05/08/23    This note was not shared with the patient due to reasonable likelihood of causing patient harm

## 2023-05-08 NOTE — PSYCH
Subjective:    Patient ID: Lewis Tariq is a 38 y o  female      [facility] Clinical Progress Notes      Specialized Services Documentation  Therapist must complete separate progress note for each specific clinical activity in which the individual participated during the day  Education Therapy   [id number]  Lewis Tariq participated actively in shared in check in and goal review  Presented as receptive related to readiness to learn  Lewis Tariq  did complete goal from last treatment day identifying gaining hope, education, advocacy, responsibility and support  did not present with any barriers to learning  [id number]  Ralston Grief Marvin engaged throughout the treatment day  Was engaged in learning related to Illness, Medication, Aftercare and Wellness Tools  Staff utilized Verbal, Written, A/V and Demonstration teaching methods  Lewis Tariq shared area of learning and set a goal for outside of program to Vacation Your Way, continue crocheting, and attend volunteer committee meeting        Tx Plan Objective: 1 1, 1 2, 1 4, Therapist: LUPE Lam

## 2023-05-08 NOTE — PSYCH
Subjective:     Patient ID: César Sullivan 38 y o  Female    [facility] Clinical Progress Notes      Specialized Services Documentation  Therapist must complete separate progress note for each specific clinical activity in which the individual participated during the day  Group Psychotherapy    [id number] César Sullivan actively participated in an open processing group on increasing empowerment and having an opportunity to be heard when one might feel isolated in sharing their experiences   spent time engaging group participants with open ended questions, reflective questions, and encouragement from other group members  In addition, it is important for the group to be able to receive multiple perspectives and feedback from other group members in a safe environment   provided space for members to share as they felt comfortable, active listening, encouragement, and offered support to group when warranted  This structure helped support the group in feeling cathartic, gain some interpersonal learning, group cohesiveness, altruism, and instilling hope  César Sullivan contributed to discussion by sharing about the topic discussed, relating to group members, and allowing self to be open/vulnerable    César Sullivan opened up the group conversation about ways to process grief and the guilt she holds onto with her father even though she knows she did everything right and listened to his wishes   good  progress noted towards goal  César Sullivan was open to support and feedback from fellow group members in which she actively engaged and self reflected  Continue with open processing therapy to provide space to support individuals in building trust, gain corrective emotional experiences, and cultivate healthier inter-dependency with others  Tx Plan Objective 1 1, 1 2, 1 4 Therapist: Jorge Luis Rincon  Case Management Note    MARIELA Soto     Current suicide risk : Low     A case management session is not scheduled today with [facility] ; additionally, they did not request a CM meeting  [facility] is aware of next scheduled 1:1     Medications changes/added/denied? No     Treatment session number: 7    Individual Case Management Visit provided today?  No    [facility] follow up physician's orders: None at this time

## 2023-05-08 NOTE — PSYCH
Subjective:     Patient ID: Candis Blancas 38 y o  Female    [facility] Clinical Progress Notes      Specialized Services Documentation  Therapist must complete separate progress note for each specific clinical activity in which the individual participated during the day  Case Management    [phone] with Candis Blancas for case management  Candis Blancas shared how she had a rough week and skills she has be using to support her in her mental health  CM and Candis Blancas also discussed ways in which she can continue focusing on herself and the work she can do to better herself  Candis Blancas will be discharging on Friday, 5/12/2023 as she needs to return to work on Monday, 5/15/2023  Candis Blancas is aware of next scheduled 1:1 meeting  Current suicide risk : Low      Medications changes/added/denied? No - see RODRICK Dahl 's note      Treatment session number: 6     Individual Case Management Visit provided today? Yes      [facility] follow up physician's orders: Continue to follow orders

## 2023-05-08 NOTE — PSYCH
Visit Time    Visit Start Time: 0930  Visit Stop Time: 2884  Total Visit Duration: 60 minutes    Subjective:     Patient ID: Manisha Beard is a 38 y o  y o  female  [facility] Clinical Progress Notes      Specialized Services Documentation  Therapist must complete separate progress note for each specific clinical activity in which the individual participated during the day  Psychotherapy Group  Manisha Beard actively shared in psychotherapy group focused on anger and emotional regulation skills  Clari Snider engaged in task exploring effective versus ineffective ways to manage anger; in addition to skills to refocus in the moment  She expressed patterns of consequences for having emotion in her past and identified the need to begin to understand/unfold emotions  Group explored the benefits of positive choices with intense emotion  Some beginning  work toward goal noted   Continue group to explore personal role in understanding and managing emotions    Tx Plan Objective: 1 1 Therapist:  Cheryl ROBERTSON & Jada Wilkinson RN

## 2023-05-09 ENCOUNTER — OFFICE VISIT (OUTPATIENT)
Dept: PSYCHOLOGY | Facility: CLINIC | Age: 43
End: 2023-05-09

## 2023-05-09 DIAGNOSIS — F33.1 MAJOR DEPRESSIVE DISORDER, RECURRENT, MODERATE (HCC): Primary | ICD-10-CM

## 2023-05-09 NOTE — PSYCH
"Subjective:    Patient ID: Iris Jackman is a 38 y o  female      [facility] Clinical Progress Notes      Specialized Services Documentation  Therapist must complete separate progress note for each specific clinical activity in which the individual participated during the day  Group Psychotherapy  [id number] Iris Jackman actively participated in a psychotherapy group focused on control  The group began by covering control fallacies, particularly the spectrum between hyper-control and out-of-control  Facilitator introduced the concept of control through the lens of the circles of: concern, influence, and control  Explanations and examples were provided for each [place] and written/drawn on the board  Participants were given a worksheet with a blank diagram that contained the three overlapping circles to fill-in with their own examples of things that are of concern (out of their control), things that are under their influence (under their indirect control), and things under their direct control  Reflection questions were provided on the backside of the worksheet and participants were guided to work on these at their own pace, following the circles of control portion  Discussion was encouraged and prompted throughout the group, and members were asked to share any questions as they arose  Iris Jackman shared at the end that this concept/material was \"crashing (her) world  \" Continue to note progress towards goals  Continue with psychotherapy to strengthen awareness of control     Tx Plan Objective 1 1, 1 2, 1 4 Therapist: ERICA Hernandez    "

## 2023-05-09 NOTE — PSYCH
Subjective:    Patient ID: Meghann Martin is a 38 y o  female      [facility] Clinical Progress Notes      Specialized Services Documentation  Therapist must complete separate progress note for each specific clinical activity in which the individual participated during the day  Education Therapy   [id number]  Meghann Martin participated actively in shared in check in and goal review  Presented as receptive related to readiness to learn  Meghann Martin  did complete goal from last treatment day identifying gaining hope, education, advocacy, responsibility and self-support  did not present with any barriers to learning  [id number]  Letty Wong engaged throughout the treatment day  Was engaged in learning related to Illness, Medication, Aftercare and Wellness Tools  Staff utilized Verbal, Written, A/V and Demonstration teaching methods   Meghann Martin shared area of learning and set a goal for outside of program to start work day at 2:45-3pm       Tx Plan Objective: 1 1, 1 2, 1 4, Therapist: LUPE Lindquist

## 2023-05-09 NOTE — PSYCH
Subjective:     Patient ID: Ju Jacobson is a 38 y o  female  [facility] Clinical Progress Notes      Specialized Services Documentation  Therapist must complete separate progress note for each specific clinical activity in which the individual participated during the day  Group Psychotherapy  ([id number]) Ju Jacobson attended group- Wellness Recovery Action Plan  Today, members focused on completing WRAP's toolbox then following specific sections and encouraged to fill in coping tools from their toolbox for planned responses:   · Daily Plan   · Identification of triggers and stressors  · Early warning signs  · When things are breaking down and or getting worse  · Crisis Plan  · Post crisis plan    Group members shared pieces of information from these sections and identified their importance  Writer encouraged the members of group to continue utilizing the packet to develop plans inside and outside of program  Good effort towards progress of goals which were displayed through participation and engagement in topic    Treatment Plan Objectives: 1 1, 1 2  Therapist: Evan WADSWORTH RN     Co-led by Rudy Meade RN

## 2023-05-10 ENCOUNTER — OFFICE VISIT (OUTPATIENT)
Dept: PSYCHOLOGY | Facility: CLINIC | Age: 43
End: 2023-05-10

## 2023-05-10 DIAGNOSIS — F33.1 MAJOR DEPRESSIVE DISORDER, RECURRENT, MODERATE (HCC): Primary | ICD-10-CM

## 2023-05-10 NOTE — PSYCH
"Subjective:     Patient ID: Jolene Haro is a 38 y o  female  [facility] Clinical Progress Notes      Specialized Services Documentation  Therapist must complete separate progress note for each specific clinical activity in which the individual participated during the day  Psychotherapeutic Group ([id number])    The group learned about all 15 cognitive distortions  They gained a further understanding about the way they think irrationally and discovered ways to address those instincts  We went over a lot of information on distortions; examples, definitions, consequences, and ways to identify/change cognitive distortions when they occur  The group was provided a worksheet with all 15 cognitive distortions and their definitions  Each cognitive distortion was explained/analyzed by the group and members provided examples for each of the 15 distortions  The group was also provided an article detailing 7 CBT solutions to cognitive distortions and a CBT worksheet for identifying when someone is thinking with distortion  Additionally, each member was given a list (w/ descriptions) of \"Ten ways to untwist your thinking\", which described 10 different ways to identify and reframe cognitive distortions  Jolene Haro was an active participant in this group via volunteering to read distortions, discussing personal anecdotes of each, and by providing/receiving feedback from peers  Stephen Valle is actively working on goals  Continue psychotherapy groups to encourage further exploration of needs, personal awareness, and skills      Tx Plan Objective: 1 1,1 2, 1 4   Therapist: Brandon Singh MS      "

## 2023-05-10 NOTE — PSYCH
"Subjective:     Patient ID: Iris Jackman is a 38 y o  female  [facility] Clinical Progress Notes      Specialized Services Documentation  Therapist must complete separate progress note for each specific clinical activity in which the individual participated during the day  Allied Therapy  [id number] Clients divided into groups to read short stories about common triggering situations such as someone saying \"no offense, but you're being sensitive\" or being the recipient of a sarcastic remark  Clients identified their own potential impulsive responses to the situation  Clients identified which line in the story triggered them and identified emotions caused by it  Facilitator led discussion to identify what about the situation was triggering  Clients responded with \"I felt invalidated/unheard\", or \"I felt disrespected\"  Facilitator used this example to define and illustrate the importance of Wise Mind  Group was then prompted to pick a coping skill and a more effective response  Afterward, clients picked instruments and created a sound track to illustrate the emotions experienced at different points in the story  Iris Jackman participated actively in large and small group discussions  Iris Jackman contributed thoughts to peers and facilitator during presentation of wise mind, and was observed participating with her small group in music-making  Continue AT for development and practice of responding effectively to triggers  Tx Objectives: 1 1, 1 2, 1 4  Therapist: LUPE Tarango    Note Reviewed and Cosigned by MARCELO Torre     Education Therapy   [id number]  Iris Jackman participated actively in shared in check in and goal review  Presented as receptive related to readiness to learn  Iris Jackman  did complete goal from last treatment day identifying gaining responsibility  did not present with any barriers to learning       [id number]  Iris Jackman engaged throughout the " treatment day  Was engaged in learning related to Illness, Medication, Aftercare and Wellness Tools  Staff utilized Verbal, Written, A/V and Demonstration teaching methods   [facility] shared area of learning and set a goal for outside of program to work only for 3 hours and 45 minutes after program       Tx Plan Objective: 1 1, 1 2, 1 4, Therapist: LUPE Jasmine

## 2023-05-10 NOTE — PSYCH
Subjective:     Patient ID: Arturo Horowitz 38 y o  Female    [facility] Clinical Progress Notes      Specialized Services Documentation  Therapist must complete separate progress note for each specific clinical activity in which the individual participated during the day  Case Management    ([phone] [phone]) Spoke with Arturotiesha Horowitz for case management  Arturo Horowitz wanted to check in with CM after open process group on Tuesday, 5/10/2023  Arturo Horowitz asked if she over shared and how she did  This writer reassured Arturo Jovelkathy that she did well and used the space/time appropriately  This writer and Arturo Horowitz also discussed adding a new treatment plan goal  Arturo Beckakathy wanted to work on challenging her negative thoughts and untwist thinking patterns to create a more balanced thought  Arturo Horowitz is still proceeding with discharge Friday 5/12/2023  Arturo Horowitz is aware of next scheduled 1:1 meeting  ([id number]) Met with Arturo Horowitz to check in at end of day and review updated tx plan  [place] was in agreement with goal and reviews/comments  No other needs at thsi time  Current suicide risk : Low      Medications changes/added/denied? No     Treatment session number: 8     Individual Case Management Visit provided today? Yes      [facility] follow up physician's orders: Continue to follow orders

## 2023-05-10 NOTE — BH TREATMENT PLAN
"Assessment/Plan:      Diagnoses and all orders for this visit:     Major depressive disorder, recurrent, moderate (HCC)     [facility] Plan      AREAS OF NEED: Depression as evidenced by feelings of worthlessness, hopelessness, intrusive thoughts with suicidal ideations (no plan or intent), decrease in motivation, energy,  lack of concentration and exhaustion, due to familial stressors, complicated grief, and general mental health that has impacted Maura Wong's ability to cope and sit with distressing feelings     Date Initiated: 05/01/23     Strengths: \"I'm really good at my job,\" \"I'm generally a good problem solver,\" \"I'm a good mom,\" and \"organized  \"                 LONG TERM GOAL:   Date Initiated: 05/01/23  1 0 While at [facility], I will participate and engage in individual and group therapy through skill building, self-reflection, mindfulness, resource building, and psychoeducation to help me handle daily stressors in healthier ways and enhance my quality of life  Target Date: 05/29/2023   Completion Date:         SHORT TERM OBJECTIVES:      Date Initiated: 05/01/23  1 1 When I am unsure what feeling I am identifying with, I will use the Emotions Feeling wheel to identify the feeling I identify with most, name the feeling out loud, allow the feeling sensations in my body, mindfully investigate the source of this feeling, and bring compassion to my experience  Revision Date: 05/10/2023  Target Date: 05/10/2023; 05/19/2023    Completion Date:      Date Initiated: 05/01/23  1 2 When I notice I am having distressing body image thoughts, I will use positive affirmations that I have identified that are true to me to challenge negative thinking and increase self-empowerment  (Ex: I am proud of who I am, and I carry myself with confidence and zach, My body is my ally, not my enemy, Comparison is the thief of [place]   Instead I choose to be grateful for me, My body is unique and beautiful, and I " celebrate it every day)  To believe in my affirmations:          A  Visualize the affirmation          B  Use Positive self-talk         C  Practice Gratitude          D  Celebrate Progress         E  Surround yourself with positivity   Revision Date: 05/10/2023   Target Date: 05/10/2023; 05/19/2023    Completion Date:     Date Initiated: 05/01/23  1 3 I will take medications as prescribed and share questions and concerns if arise  Revision Date: 05/10/2023   Target Date: 05/10/2023; 05/19/2023    Completion Date:      Date Initiated: 05/01/23  1 4 I will identify 3 ways my supports can assist in my recovery and agree to use them as needed  Revision Date: 05/10/2023   Target Date: 05/10/2023; 05/19/2023   Completion Date:            7 DAY REVISION:  1 5:  When I notice my anxiety is influencing my thoughts and I will use techniques I learned from cognitive distortions to: 1  Identify which cognitive distortion I am engaging in and 2  Use a thought stopping technique to challenge my negative thoughts and halt rumination to reframe my thinking in a healthy positive way do decrease anxiety and isolation  Date Initiated: 05/10/2023   Revision Date:   Target Date: 05/19/2023   Completion Date:       PSYCHIATRY:  Date Initiated: 05/01/23   Medication Management and Education       Revision Date: 05/10/23       Continue medication management  The person(s) responsible for carrying out the plan is Soheila Koch MD    NURSING:   Date Initiated: 05/01/23  1 1,1 2,1 3,1 4 This RN will provide daily wellness group five days weekly to educate Merritt Palmer on S/S of their diagnoses and medications used in treatment  Revision Date: 05/10/23  1 1,1 2,1 3,1 4,1 5 Continue to encourage Merritt Palmer to participate in wellness groups daily to learn about symptoms, coping strategies and warning signs to promote relapse prevention    The person(s) responsible for carrying out the plan is Beryle Pressman [id number] [address] & [place]amarika [place]oc[place], [place]     PSYCHOLOGY:   Date Initiated: 05/01/23       1 1, 1 2, 1 4 Provide psychotherapy group 5 times per week to allow opportunity for Merritt Palmer to explore stressors and ways of   coping  Revision Date: 05/10/23  1 1,1 2,1 4,1 5 Continue to provide psychotherapy group daily to Merritt Spencer and encourage sharing of stressors, skills and positive change  The person(s) responsible for carrying out the plan is Korey Saunders    ALLIED THERAPY:   Date Initiated: 05/10/2023  1 1,1 2 Engage Maura Wong in AT group 5 times daily to encourage development and use of wellness tools to decrease symptoms and promote recovery through meaningful activity  Revision Date: 05/10/23  1 1,1 2,1 5 Continue to engage Merritt Palmer to participate in AT group to practice wellness tools within program and transfer to home sharing successes and barriers through healthy task involvement  The person(s) responsible for carrying out the plan is MARCELO Varela / Georgette ROBERTSON     CASE MANAGEMENT:   Date Initiated: 05/01/23      1 0 This  will meet with Merritt Palmer 3 - 4 times weekly to assess treatment progress, discharge    planning, connection to community supports and UR as indicated  Revision Date: 05/10/23  1 0 Continue to meet with Merritt Electric  3-4 times weekly to assess growth, work toward goals, continued treatment needs, dc planning and use of supports  The person(s) responsible for carrying out the plan is Korey Saunders     TREATMENT REVIEW/COMMENTS: Merritt Palmer continues to improve while she is in [facility]  Merritt Electric allows self to be vulnerable, seeks support, is open to feedback, and engages in self-reflection  Merritt Palmer has shared during a group that she is working on asking for help, and opened a group discussion on asking for support outside of program which is a great stepping stone in her progress   It is important for Merritt Electric to continue focusing on herself in her journey and be mindful of other people's behaviors/actions so she does not become emotionally drained and or burned out  By doing so will help Merritt Palmer build self esteem, confidence, and meeting her needs to be the best version of herself in her continued wellness journey  DISCHARGE CRITERIA: Identify 3 signs of progress and complete relapse prevention plan  DISCHARGE PLAN: Connect with identified outpatient providers  Estimated Length of Stay: 10 treatment days       CLIENT COMMENTS / Please share your thoughts, feelings, need and/or experiences regarding your treatment plan with Staff  Please see follow up note with comments          Signatures can be found on [facility] Treatment plan consent form

## 2023-05-10 NOTE — PSYCH
Visit Time    Visit Start Time: 4385  Visit Stop Time: 1330  Total Visit Duration: 60 minutes    Subjective:     Patient ID: Garrett Santana is a 38 y o  y o  female  [facility] Clinical Progress Notes      Specialized Services Documentation  Therapist must complete separate progress note for each specific clinical activity in which the individual participated during the day  Group Psychotherapy  Garrett Santana actively shared in psychotherapy  group focused on decreasing self- stigma and awareness of community supports  Alesha Hayes attentively listened to Certified Peer Specialist Lucian Siegel share her life story  Group encouraged power of learning about self, accepting illness and personal responsibility in recovery  Community resources reviewed in addition to personal resources like the [employer]  Some progress toward goal noted  Continue psychotherapy to encourage self -awareness and healthy engagement of supports  Tx Plan Objective: 1 1,1 4, Therapist:  Cr ROBERTSON;  CEDRIC Lopez; Manish Guerrier RN

## 2023-05-11 ENCOUNTER — OFFICE VISIT (OUTPATIENT)
Dept: PSYCHOLOGY | Facility: CLINIC | Age: 43
End: 2023-05-11

## 2023-05-11 DIAGNOSIS — F33.1 MAJOR DEPRESSIVE DISORDER, RECURRENT, MODERATE (HCC): Primary | ICD-10-CM

## 2023-05-11 NOTE — PSYCH
Subjective:     Patient ID: César Sullivan is a 38 y o  female  [facility] Clinical Progress Notes      Specialized Services Documentation  Therapist must complete separate progress note for each specific clinical activity in which the individual participated during the day  Allied Therapy  [id number]--  César Sullivan participated in [facility] group focused on the use of music mindfulness and grounding strategies to regulate thoughts and emotions  César Sullivan  engaged in  music listening, practicing techniques, as well as group discussion  Group explored practice of various mindfulness strategies with active music listening to explore how to ground themselves when experiencing intense emotions  Group utilized sensory mindfulness techniques (rainbow, 5 countdown technique, draw your breath, etc ), rhythmic grounding, and listening to music to identify emotions and the body sensations that occur  César Sullivan presented as engaged as observed by her taking notes, asking questions for clarification, and became tearful at one moment when discussing creating a visual safe space in our minds whether it was a place we have visited or place that exists  Some effort noted toward treatment goal  Continue AT to encourage the development and practice of utilizing mindfulness techniques to alleviate symptoms and support wellness    Tx Plan Objective: 1 1,  1 2, & 1 4    Therapist:  MARCELO Diana

## 2023-05-11 NOTE — PSYCH
Subjective:    Patient ID: Edin Ferreira is a 38 y o  female      [facility] Clinical Progress Notes      Specialized Services Documentation  Therapist must complete separate progress note for each specific clinical activity in which the individual participated during the day  Group Psychotherapy  0930-1030 Edin Ferreira participated actively a psychotherapy group on creative expression in communication and teamwork  Participants engaged in a mental health artistic activity where one person draws does not use words and the rest of the group guesses what they are silently communicating from their artwork  Group members also engaged in an activity in which participants were tasked with communicating terms related to wellness, with language constrictions that challenged the group to express themselves in novel ways  Participants exhibited creativity, compromise and active listening skills, and strength towards peer-support  Continue to note progress towards treatment goals  Continue with psychotherapy to encourage further practice on creative communication and teamwork    Tx Plan Objective: 1 1, 1 2, 1 4  Therapist: ERICA Chung

## 2023-05-11 NOTE — PSYCH
"Subjective:     Patient ID: Carleen Ace 38 y o  Female    [facility] Clinical Progress Notes      Specialized Services Documentation  Therapist must complete separate progress note for each specific clinical activity in which the individual participated during the day  Group Psychotherapy     [id number] Carleen Ace actively participated in in a psychoeducational group about trauma and the body and brain's responses  Group members learned about what trauma is through discussion of “Big T” and “Little t” traumas, how it impacts our day-to-day lives, and how it can impact our brain development which impacts our coping and wellness  The group learned about how personal experiences shape our perceptions of trauma that is related to pre-disposing factors such as distress tolerance, environment, beliefs, morals, and values  This was introduced as the 3 E's:   • Event  • Experience and   • Effects of understanding trauma  The group then learned about the trauma tree and how understanding each part can help on the road to recovery  In addition,  shared some about the brain structure and its part in traumatic memories  This included the brain structure using [employer] model of the brain\" resource; discussing the brain stem, limbic system, and the cerebral cortex  Members were invited to share their experiences if they were willing, engage in meaningful discussions and engaged in reflective thinking  Lastly, group members were provided the worksheet: \"Growing Stronger from Trauma  \" This worksheet helps individuals identify the strengths they used to cope with their past traumas and new one they've come out with as a result  Carleen Ace engaged nonverbally as evidenced by taking notes, maintaining eye contact, open body language, and overall attentiveness However, Carleen Ace was visibly upset regarding a particular group member who triggered her and needed to step out   She was " "able to return, but still was actively triggered with audible sighing and rocking back and forth, engaging in self-soothe   some  progress noted towards goals  Continue with psychoeducation to promote further understanding of the influence of trauma in one's life and ability to process such trauma through use of skills learned while in [facility]  TX Plan Objectives: 1 1, 1 2, 1 4  Therapist: Jennifer Joseph    Case Management    ([id number]) Spoke with Kervin Rodgers for case management  During the Trauma psychoeducation group, it was evident that Kervin Rodgers became triggered by a group member  She took time for what she needed, removed herself from the group, and returned  However, this particular group member was still sharing and she became more agitated and upset  During Case Management Kervin Rodgers was able to take responsibility for her feelings and acknowledge her judgment, but remained stuck  CM asked does this person remind her of anyone to which she responded: \"my mother  \"  continued to provide support to Kervin Rodgers and engaged in discussion about areas of her control and discussion of we cannot make assumptions about anyone's mental health and where they are in their journey, yet at the same time, we can still be frustrated  Kervin Rodgers will engage in self reflection and recognizes the trauma she needs to process  Kervin Rodgers is scheduled for discharge, Friday 5/12/2023  Kervin Rodgers is aware of next scheduled 1:1 meeting  Current suicide risk : Low      Medications changes/added/denied? No     Treatment session number: 9     Individual Case Management Visit provided today? Yes      [facility] follow up physician's orders: Continue to follow orders       "

## 2023-05-11 NOTE — PSYCH
"[facility] Discharge Instructions:     Disposition: home  Address: [address] [facility] [address]   Diagnosis:    Diagnosis ICD-10-CM Associated Orders   1  Major depressive disorder, recurrent, moderate (HCC)  F33 1         Allergies (Drug/Food): Allergies   Allergen Reactions   • Pollen Extract Allergic Rhinitis   • Codeine      Other reaction(s): Unknown Reaction   • Morphine      Other reaction(s): Other (See Comments)   • Demerol [Meperidine] Rash   • Other      Annotation - [id number]: mushroom       Activity: you may not return to work until Monday, 5/15/2023   Diet:no recommendations  Smoking Cessation: The best thing you can do to improve your health is to stop using tobacco  Diagnostic/Laboratory Orders: N/A  Vaccines: If you received a vaccine, please notify your family physician on your next visit  For more information, please call [phone]  Follow-up appointments/Referrals:     PCP Follow Up - medication management  Tuesday 5/23/2023 @ 9 AM   Yenifer Cruz MD   [facility],   [address] [place] [address], [address]     Therapy:   Tuesday, 5/16/2023   Monse Saldana, PhD   www [employer]  [address], [facility], [address] [facility] ·   [phone]       ICM/CTT:N/A    [facility]: [address], [address] / [phone]  - Your  was Elaine Alfredo and the  is Colletta Bugler    Intake/Referral/Evaluation (Non-Emergency) *NON INSURED FOR FUNDING: [facility]: [phone], [facility]: [phone], [facility]: [phone] and [place]: [phone]       Crisis Intervention (Emergency) [place] Service: [facility]: [phone], [place]: [phone], [facility] [address]: [phone], Count includes the [facility]): [phone], [facility]: [phone] and C/M/P: [phone]  _________________________________    National Crisis Textline: text \"HOME\" to 218047  [facility] " "Helpline 091 920 91 42 (8-009-447-CEDRIC) or Text \"helpline\" to [address]: 988 Call or Text     I, the undersigned, have received and understand the above instructions          Patient/Rep Signature: __________________________________       Date/Time: ______________       Physician Signature: ____________________________________      Date/Time: ______________             Signature: ________________________________       Date/Time: ______________   "

## 2023-05-11 NOTE — PSYCH
Subjective:    Patient ID: Garrett Santana is a 38 y o  female      [facility] Clinical Progress Notes      Specialized Services Documentation  Therapist must complete separate progress note for each specific clinical activity in which the individual participated during the day  Education Therapy   [id number]  Garrett Santana participated actively in shared in check in and goal review  Presented as receptive related to readiness to learn  Garrett Santana  did complete goal from last treatment day identifying gaining advocacy, responsibility and self-support  did not present with any barriers to learning  [id number]  Allison Wong engaged throughout the treatment day  Was engaged in learning related to Illness, Medication, Aftercare and Wellness Tools  Staff utilized Verbal, Written, A/V and Demonstration teaching methods  Garrett Santana shared area of learning and set a goal for outside of program to reflect on group topics/handouts and prepare for discharge        Tx Plan Objective: 1 1, 1 2, 1 4, Therapist: LUPE Vitale

## 2023-05-12 ENCOUNTER — OFFICE VISIT (OUTPATIENT)
Dept: PSYCHOLOGY | Facility: CLINIC | Age: 43
End: 2023-05-12

## 2023-05-12 DIAGNOSIS — F33.1 MAJOR DEPRESSIVE DISORDER, RECURRENT, MODERATE (HCC): Primary | ICD-10-CM

## 2023-05-12 NOTE — PSYCH
Subjective:     Patient ID: Gisella Daniels is a 38 y o  female  [facility] Clinical Progress Notes      Specialized Services Documentation  Therapist must complete separate progress note for each specific clinical activity in which the individual participated during the day  Psychoeducation  [id number] The group engaged in the wellness assessment, which evaluates progress on several different areas of wellness/wellbeing: physical, emotional, cognitive, vocational, social and spiritual  Clients rated their progress and discussed areas that need work  By completing and discussing areas of progress and challenges, members are connected and reminded that, in their mental health struggle, they are not alone  Topics of discussion revolved around positive experiences within each area of wellness as well as the challenging aspects to wellness within their past week  Shirleymelinda Mckeon provided support to another peer sharing personal examples as to how she related to similar strengths and challenge areas  Shirleymelinda Godinezk identified she struggles with finding healthy work life/balance and recognizes it translates to other areas of her life where she has poor boundaries  Gisella Daniels continues to make progress towards goals through participation in group activity and personal disclosures  [address] today  Encourage Maura Wong to continue using the skills they learned in AT groups to alleviate symptoms and promote mental health wellness      TX Plan Objectives: 1 1, 1 2, 1 4    Therapist: MARCELO Gutierrez

## 2023-05-13 ENCOUNTER — HOSPITAL ENCOUNTER (OUTPATIENT)
Dept: MAMMOGRAPHY | Facility: MEDICAL CENTER | Age: 43
Discharge: HOME | End: 2023-05-13

## 2023-05-13 VITALS — HEIGHT: 66 IN | BODY MASS INDEX: 38.62 KG/M2 | WEIGHT: 240.3 LBS

## 2023-05-13 DIAGNOSIS — Z12.31 ENCOUNTER FOR SCREENING MAMMOGRAM FOR MALIGNANT NEOPLASM OF BREAST: ICD-10-CM

## 2023-05-15 ENCOUNTER — APPOINTMENT (OUTPATIENT)
Dept: PSYCHOLOGY | Facility: CLINIC | Age: 43
End: 2023-05-15
Payer: COMMERCIAL

## 2023-05-15 NOTE — PSYCH
Subjective:     Patient ID: Jolene Haro is a 38 y o  female  [facility] Clinical Progress Notes      Specialized Services Documentation  Therapist must complete separate progress note for each specific clinical activity in which the individual participated during the day  Allied Therapy  6640-8551 Group first participated in [address]  for grounding and mindfulness  Group members were then led in an activity focused on recognizing personal tendencies in group interaction  Group members were given cards, each containing one word to a song title, and were instructed to obtain cards that make up one song title  Multiple rounds occurred with different parameters regarding communication  Discussion followed each round detailing personal tendencies, interaction with others, and how these areas relate to mental health or the outside world  At the end of the session, group members listened to one of the songs from the game  Jolene Haro participated actively in music-making, interactive experience, and discussion throughout the session  Continue AT for the development and practice of communication and navigating uncomfortable emotions  Tx Objectives: 1 1, 1 2, 1 4  Therapists: LUPE Zarate and BLAKE SerranoBC     Education Therapy     [id number]  Jolene Haro engaged throughout the treatment day  Was engaged in learning related to Illness, Medication, Aftercare and Wellness Tools  Staff utilized Verbal, Written, A/V and Demonstration teaching methods   Jolene Haro shared area of learning and set a goal for outside of program to go for a run after program       Tx Plan Objective: 1 1, 1 2, 1 4, Therapist: LUPE Zarate

## 2023-05-15 NOTE — PSYCH
"Assessment/Plan:      Diagnoses and all orders for this visit:     Major depressive disorder, recurrent, moderate ([address] )     [facility] Plan      AREAS OF NEED: Depression as evidenced by feelings of worthlessness, hopelessness, intrusive thoughts with suicidal ideations (no plan or intent), decrease in motivation, energy,  lack of concentration and exhaustion, due to familial stressors, complicated grief, and general mental health that has impacted Maura Wong's ability to cope and sit with distressing feelings       Date Initiated: 05/01/23     Strengths: \"I'm really good at my job,\" \"I'm generally a good problem solver,\" \"I'm a good mom,\" and \"organized  \"                 LONG TERM GOAL:   Date Initiated: 05/01/23  1 0 While at [facility], I will participate and engage in individual and group therapy through skill building, self-reflection, mindfulness, resource building, and psychoeducation to help me handle daily stressors in healthier ways and enhance my quality of life  Target Date: 05/29/2023   Completion Date: 05/15/2023        SHORT TERM OBJECTIVES:      Date Initiated: 05/01/23  1 1 When I am unsure what feeling I am identifying with, I will use the Emotions Feeling wheel to identify the feeling I identify with most, name the feeling out loud, allow the feeling sensations in my body, mindfully investigate the source of this feeling, and bring compassion to my experience  Revision Date: 05/10/2023  Target Date: 05/10/2023; 05/19/2023    Completion Date: 05/15/2023     Date Initiated: 05/01/23  1 2 When I notice I am having distressing body image thoughts, I will use positive affirmations that I have identified that are true to me to challenge negative thinking and increase self-empowerment   (Ex: I am proud of who I am, and I carry myself with confidence and zach, My body is my ally, not my enemy, Comparison is the thief of [place]   Instead I choose to be grateful for me, My body is unique and " beautiful, and I celebrate it every day)      To believe in my affirmations:          A  Visualize the affirmation          B  Use Positive self-talk         C  Practice Gratitude          D  Celebrate Progress         E  Surround yourself with positivity   Revision Date: 05/10/2023   Target Date: 05/10/2023; 05/19/2023    Completion Date: 05/15/2023     Date Initiated: 05/01/23  1 3 I will take medications as prescribed and share questions and concerns if arise     Revision Date: 05/10/2023   Target Date: 05/10/2023; 05/19/2023    Completion Date: 05/15/2023     Date Initiated: 05/01/23  1 4 I will identify 3 ways my supports can assist in my recovery and agree to use them as needed     Revision Date: 05/10/2023   Target Date: 05/10/2023; 05/19/2023   Completion Date: 05/15/2023            7 DAY REVISION:  1 5:  When I notice my anxiety is influencing my thoughts and I will use techniques I learned from cognitive distortions to: 1  Identify which cognitive distortion I am engaging in and 2  Use a thought stopping technique to challenge my negative thoughts and halt rumination to reframe my thinking in a healthy positive way do decrease anxiety and isolation  Date Initiated: 05/10/2023   Revision Date:  N/A - discharge   Target Date: 05/19/2023   Completion Date: 05/15/2023        PSYCHIATRY:  Date Initiated: 05/01/23   Medication Management and Education       Revision Date: 05/10/23       Continue medication management  The person(s) responsible for carrying out the plan is Edy Martinez MD     NURSING:   Date Initiated: 05/01/23  1 1,1 2,1 3,1 4 This RN will provide daily wellness group five days weekly to educate Merritt Palmer on S/S of their diagnoses and medications used in treatment     Revision Date: 05/10/23  1 1,1 2,1 3,1 4,1 5 Continue to encourage Merritt Palmer to participate in wellness groups daily to learn about symptoms, coping strategies and warning signs to promote relapse prevention  The person(s) responsible for carrying out the plan is Vidal Pressley MS & Tamir Huntley [place]      PSYCHOLOGY:   Date Initiated: 05/01/23       1 1, 1 2, 1 4 Provide psychotherapy group 5 times per week to allow opportunity for Merritt Palmer to explore stressors and ways of   coping  Revision Date: 05/10/23  1 1,1 2,1 4,1 5 Continue to provide psychotherapy group daily to Merritt Palmer and encourage sharing of stressors, skills and positive change  The person(s) responsible for carrying out the plan is Lauren M Ruther [place], [place]     ALLIED THERAPY:   Date Initiated: 05/10/2023  1 1,1 2 Engage Maura Wong in AT group 5 times daily to encourage development and use of wellness tools to decrease symptoms and promote recovery through meaningful activity  Revision Date: 05/10/23  1 1,1 2,1 5 Continue to engage Merrittissac Palmer to participate in AT group to practice wellness tools within program and transfer to home sharing successes and barriers through healthy task involvement  The person(s) responsible for carrying out the plan is MARCELO Mchugh / Bryce LOZANOBC      CASE MANAGEMENT:   Date Initiated: 05/01/23      1 0 This  will meet with Merritt Palmer 3 - 4 times weekly to assess treatment progress, discharge    planning, connection to community supports and UR as indicated  Revision Date: 05/10/23  1 0 Continue to meet with [employer]itt Electric  3-4 times weekly to assess growth, work toward goals, continued treatment needs, dc planning and use of supports  The person(s) responsible for carrying out the plan is Lauren M Ruther [place], [place]      TREATMENT REVIEW/COMMENTS: Merritt Palmer continues to improve while she is in [facility] [place]  Merritt Electric allows self to be vulnerable, seeks support, is open to feedback, and engages in self-reflection   Merritt Palmer has shared during a group that she is working on asking for help, and opened a group discussion on asking for support outside of program which is a great stepping stone in her progress  It is important for Merritt Palmer to continue focusing on herself in her journey and be mindful of other people's behaviors/actions so she does not become emotionally drained and or burned out  By doing so will help Merritt Palmer build self esteem, confidence, and meeting her needs to be the best version of herself in her continued wellness journey        DISCHARGE CRITERIA: Identify 3 signs of progress and complete relapse prevention plan     DISCHARGE PLAN: Connect with identified outpatient providers     Estimated Length of Stay: 10 treatment days       CLIENT COMMENTS / Please share your thoughts, feelings, need and/or experiences regarding your treatment plan with Staff  Rigoberto Mcgee see follow up note with comments         Signatures can be found on [facility] Treatment plan consent form
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
## 2023-05-15 NOTE — PSYCH
"DISCHARGE ORDER: 5/12/2023    Routine Discharge - Dr Colonel Villagomez     Subjective:     Patient ID: Iris Jackman 38 y o  Female    [facility] Clinical Progress Notes      Specialized Services Documentation  Therapist must complete separate progress note for each specific clinical activity in which the individual participated during the day  Case Management    ([id number]) Met with Sabinochris Jackman  Reviewed relapse prevention plan, aftercare plan, and medication list (copies provided)  Iris Jackman shared improvement through \"I feel productive, and not in an obligatory way,\" \"I'm taking care of myself,\" \"I'm getting things done,\" \"my medication is better,\" \"i'm better able to parse out the nonsense  \"  Denied SI, HI, and psychosis  Aftercare providers to receive summary  Current suicide risk : Low     Medications changes/added/denied? No     Treatment session number: 10     Individual Case Management Visit provided today?  Yes     [facility] follow up physician's orders: Proceed with discharge      "

## 2023-05-15 NOTE — PSYCH
Subjective:    Patient ID: Iris Jackman is a 38 y o  female      [facility] Clinical Progress Notes      Specialized Services Documentation  Therapist must complete separate progress note for each specific clinical activity in which the individual participated during the day  Group Psychotherapy  [id number] Iris Jackman participated actively a psychotherapy group focused on finding hobbies and self-soothing  The group was supplied with: lined paper, a variety of journal prompts, art supplies, coloring sheets, card games, books on mindfulness and mental health, books of puzzles, crafting sven, origami supplies, a collaborative Lego set, poetry, information on embroidery, and blank paper to engage with as well  Participants were encouraged to engage in discussion with one another, ask questions, share materials, and try out different mediums/materials to find ones that suit them  Continue to note progress towards goals  Encouraged upon discharge to continue exploration of stress relief tools     Tx Plan Objective 1 1, 1 2, 1 4 Therapist: Stacie Gurrola, [address] Co-Facilitator Enzo Ha, RN

## 2023-05-16 ENCOUNTER — APPOINTMENT (OUTPATIENT)
Dept: PSYCHOLOGY | Facility: CLINIC | Age: 43
End: 2023-05-16
Payer: COMMERCIAL

## 2023-05-17 ENCOUNTER — APPOINTMENT (OUTPATIENT)
Dept: PSYCHOLOGY | Facility: CLINIC | Age: 43
End: 2023-05-17
Payer: COMMERCIAL

## 2023-05-18 ENCOUNTER — APPOINTMENT (OUTPATIENT)
Dept: PSYCHOLOGY | Facility: CLINIC | Age: 43
End: 2023-05-18
Payer: COMMERCIAL

## 2023-05-19 ENCOUNTER — APPOINTMENT (OUTPATIENT)
Dept: PSYCHOLOGY | Facility: CLINIC | Age: 43
End: 2023-05-19
Payer: COMMERCIAL

## 2023-05-23 ENCOUNTER — OFFICE VISIT (OUTPATIENT)
Dept: FAMILY MEDICINE CLINIC | Facility: CLINIC | Age: 43
End: 2023-05-23

## 2023-05-23 VITALS
OXYGEN SATURATION: 98 % | DIASTOLIC BLOOD PRESSURE: 68 MMHG | SYSTOLIC BLOOD PRESSURE: 122 MMHG | HEART RATE: 87 BPM | BODY MASS INDEX: 38.79 KG/M2 | HEIGHT: 66 IN | TEMPERATURE: 97.2 F

## 2023-05-23 DIAGNOSIS — Z12.11 ENCOUNTER FOR SCREENING COLONOSCOPY: ICD-10-CM

## 2023-05-23 DIAGNOSIS — F33.1 MAJOR DEPRESSIVE DISORDER, RECURRENT, MODERATE (HCC): Primary | ICD-10-CM

## 2023-05-23 NOTE — PROGRESS NOTES
"Assessment/Plan:    Major depressive disorder, recurrent, moderate (HCC)  - Continue Lexapro 20mg daily and weekly therapy sessions    Encounter for screening colonoscopy  - Discussed with patient that the USPSTF recommends screening for colorectal cancer starting at age 39 years and continuing until age 76 years  Patient has agreed to undergo testing at this time  All questions were answered  -     Ambulatory Referral to Gastroenterology; Future          Subjective:      Patient ID: Da Mendosa is a 37 y o  female  HPI     Da Mendosa is a very pleasant 37year old female who presents today for a follow up of her depression  Patient was referred to a psychiatry partial program which she completed and her Lexapro was also increased to 20mg  Since completing the program and increasing the dose of her lexapro she reports that her symptoms are much improved  She is now back to work and continues to do weekly outpatient therapy sessions  The following portions of the patient's history were reviewed and updated as appropriate: allergies, current medications, past family history, past medical history, past social history, past surgical history and problem list     Review of Systems   Constitutional: Negative  HENT: Negative  Eyes: Negative  Respiratory: Negative  Cardiovascular: Negative  Gastrointestinal: Negative  Genitourinary: Negative  Musculoskeletal: Negative  Skin: Negative  Neurological: Negative  Psychiatric/Behavioral: Negative  Objective:      /68 (BP Location: Left arm, Patient Position: Sitting, Cuff Size: Large)   Pulse 87   Temp (!) 97 2 °F (36 2 °C) (Temporal)   Ht 5' 6\" (1 676 m)   LMP 05/11/2023   SpO2 98%   BMI 38 79 kg/m²          Physical Exam  Constitutional:       General: She is not in acute distress  Appearance: She is not ill-appearing  HENT:      Head: Normocephalic and atraumatic     Eyes:      General:         Right eye: " No discharge  Left eye: No discharge  Extraocular Movements: Extraocular movements intact  Cardiovascular:      Rate and Rhythm: Normal rate  Pulmonary:      Effort: Pulmonary effort is normal  No respiratory distress  Neurological:      General: No focal deficit present  Mental Status: She is alert     Psychiatric:         Mood and Affect: Mood normal          Behavior: Behavior normal

## 2023-06-21 ENCOUNTER — TELEPHONE (OUTPATIENT)
Age: 43
End: 2023-06-21

## 2023-06-21 ENCOUNTER — PREP FOR PROCEDURE (OUTPATIENT)
Age: 43
End: 2023-06-21

## 2023-06-21 DIAGNOSIS — Z12.11 SCREENING FOR COLON CANCER: Primary | ICD-10-CM

## 2023-06-21 NOTE — TELEPHONE ENCOUNTER
Scheduled date of colonoscopy (as of today):     Physician performing colonoscopy: John     Location of colonoscopy: [facility]    Bowel prep reviewed with patient: Pt is requesting Rx prep to be called into Pharmacy on [address]     Instructions reviewed with patient by: please send via Portal

## 2023-07-28 ENCOUNTER — TELEPHONE (OUTPATIENT)
Dept: GASTROENTEROLOGY | Facility: MEDICAL CENTER | Age: 43
End: 2023-07-28

## 2023-07-28 DIAGNOSIS — Z12.11 SCREENING FOR COLON CANCER: Primary | ICD-10-CM

## 2023-07-28 RX ORDER — BISACODYL 5 MG/1
TABLET, DELAYED RELEASE ORAL
Qty: 2 TABLET | Refills: 0 | Status: SHIPPED | OUTPATIENT
Start: 2023-07-28

## 2023-07-28 NOTE — TELEPHONE ENCOUNTER
Pt being [facility] back to [facility] with [facility] at this time   Spoke with patient to confirm 8/14 procedure, informing that a  is needed, bowel prep sent to [facility] on [address], and that instructions sent via [address] [place] [address].

## 2023-08-03 ENCOUNTER — TELEPHONE (OUTPATIENT)
Age: 43
End: 2023-08-03

## 2023-08-04 ENCOUNTER — TELEPHONE (OUTPATIENT)
Dept: GASTROENTEROLOGY | Facility: MEDICAL CENTER | Age: 43
End: 2023-08-04

## 2023-08-04 NOTE — TELEPHONE ENCOUNTER
LVM attempting to confirm 8/25 procedure, informing that a  is needed, that prep sent to [facility] on [facility], and that instructions sent via [address] [place] [address].

## 2023-08-23 RX ORDER — SODIUM CHLORIDE 9 MG/ML
125 INJECTION, SOLUTION INTRAVENOUS CONTINUOUS
Status: CANCELLED | OUTPATIENT
Start: 2023-08-23

## 2023-08-25 ENCOUNTER — ANESTHESIA (OUTPATIENT)
Dept: GASTROENTEROLOGY | Facility: MEDICAL CENTER | Age: 43
End: 2023-08-25

## 2023-08-25 ENCOUNTER — HOSPITAL ENCOUNTER (OUTPATIENT)
Dept: GASTROENTEROLOGY | Facility: MEDICAL CENTER | Age: 43
Setting detail: OUTPATIENT SURGERY
End: 2023-08-25
Payer: COMMERCIAL

## 2023-08-25 ENCOUNTER — ANESTHESIA EVENT (OUTPATIENT)
Dept: GASTROENTEROLOGY | Facility: MEDICAL CENTER | Age: 43
End: 2023-08-25

## 2023-08-25 ENCOUNTER — RA CDI HCC (OUTPATIENT)
Dept: OTHER | Facility: HOSPITAL | Age: 43
End: 2023-08-25

## 2023-08-25 VITALS
TEMPERATURE: 97.8 F | BODY MASS INDEX: 38.62 KG/M2 | DIASTOLIC BLOOD PRESSURE: 63 MMHG | WEIGHT: 240.3 LBS | HEIGHT: 66 IN | RESPIRATION RATE: 18 BRPM | HEART RATE: 66 BPM | OXYGEN SATURATION: 100 % | SYSTOLIC BLOOD PRESSURE: 111 MMHG

## 2023-08-25 DIAGNOSIS — Z12.11 SCREENING FOR COLON CANCER: ICD-10-CM

## 2023-08-25 LAB
EXT PREGNANCY TEST URINE: NEGATIVE
EXT. CONTROL: NORMAL

## 2023-08-25 PROCEDURE — 81025 URINE PREGNANCY TEST: CPT | Performed by: ANESTHESIOLOGY

## 2023-08-25 PROCEDURE — 88305 TISSUE EXAM BY PATHOLOGIST: CPT | Performed by: PATHOLOGY

## 2023-08-25 RX ORDER — PROPOFOL 10 MG/ML
INJECTION, EMULSION INTRAVENOUS AS NEEDED
Status: DISCONTINUED | OUTPATIENT
Start: 2023-08-25 | End: 2023-08-25

## 2023-08-25 RX ORDER — PROPOFOL 10 MG/ML
INJECTION, EMULSION INTRAVENOUS CONTINUOUS PRN
Status: DISCONTINUED | OUTPATIENT
Start: 2023-08-25 | End: 2023-08-25

## 2023-08-25 RX ORDER — ONDANSETRON 2 MG/ML
INJECTION INTRAMUSCULAR; INTRAVENOUS AS NEEDED
Status: DISCONTINUED | OUTPATIENT
Start: 2023-08-25 | End: 2023-08-25

## 2023-08-25 RX ORDER — LIDOCAINE HYDROCHLORIDE 20 MG/ML
INJECTION, SOLUTION EPIDURAL; INFILTRATION; INTRACAUDAL; PERINEURAL AS NEEDED
Status: DISCONTINUED | OUTPATIENT
Start: 2023-08-25 | End: 2023-08-25

## 2023-08-25 RX ORDER — SODIUM CHLORIDE 9 MG/ML
125 INJECTION, SOLUTION INTRAVENOUS CONTINUOUS
Status: DISCONTINUED | OUTPATIENT
Start: 2023-08-25 | End: 2023-08-29 | Stop reason: HOSPADM

## 2023-08-25 RX ADMIN — PROPOFOL 140 MG: 10 INJECTION, EMULSION INTRAVENOUS at 09:40

## 2023-08-25 RX ADMIN — SODIUM CHLORIDE 125 ML/HR: 0.9 INJECTION, SOLUTION INTRAVENOUS at 09:26

## 2023-08-25 RX ADMIN — PROPOFOL 30 MG: 10 INJECTION, EMULSION INTRAVENOUS at 09:46

## 2023-08-25 RX ADMIN — ONDANSETRON 4 MG: 2 INJECTION INTRAMUSCULAR; INTRAVENOUS at 09:40

## 2023-08-25 RX ADMIN — LIDOCAINE HYDROCHLORIDE 100 MG: 20 INJECTION, SOLUTION EPIDURAL; INFILTRATION; INTRACAUDAL at 09:40

## 2023-08-25 RX ADMIN — PROPOFOL 170 MCG/KG/MIN: 10 INJECTION, EMULSION INTRAVENOUS at 09:40

## 2023-08-25 NOTE — ANESTHESIA POSTPROCEDURE EVALUATION
Post-Op Assessment Note    CV Status:  Stable    Pain management: adequate     Mental Status:  Alert and awake   Hydration Status:  Euvolemic   PONV Controlled:  Controlled   Airway Patency:  Patent      Post Op Vitals Reviewed: Yes      Staff: Anesthesiologist         No notable events documented.     /63 (08/25/23 1006)    Temp      Pulse 72 (08/25/23 1006)   Resp 16 (08/25/23 1006)    SpO2 99 % (08/25/23 1006)

## 2023-08-25 NOTE — ANESTHESIA PREPROCEDURE EVALUATION
Procedure:  COLONOSCOPY    Relevant Problems   CARDIO   (+) Hemangioma of liver      GI/HEPATIC   (+) Hemangioma of liver      MUSCULOSKELETAL   (+) Arthritis   (+) Thoracic myofascial strain      NEURO/PSYCH   (+) Anxiety   (+) Depression   (+) Major depressive disorder, recurrent, moderate (HCC)      PULMONARY   (+) Asthma        Physical Exam    Airway    Mallampati score: I  TM Distance: >3 FB  Neck ROM: full     Dental       Cardiovascular  Rhythm: regular, Rate: normal, Cardiovascular exam normal    Pulmonary  Pulmonary exam normal     Other Findings        Anesthesia Plan  ASA Score- 2     Anesthesia Type- IV sedation with anesthesia with ASA Monitors. Additional Monitors:   Airway Plan:           Plan Factors-Exercise tolerance (METS): >4 METS. Chart reviewed. Existing labs reviewed. Patient summary reviewed. Patient is not a current smoker. Induction- intravenous. Postoperative Plan-     Informed Consent- Anesthetic plan and risks discussed with patient.

## 2023-08-25 NOTE — PROGRESS NOTES
[facility] [address] [facility] coding opportunities       Chart reviewed, no opportunity found: CHART REVIEWED, NO OPPORTUNITY FOUND        Patients Insurance        Commercial Insurance: [employer]

## 2023-08-25 NOTE — H&P
History and Physical - [facility]  [facility] 37 y.o. female MRN: [id number]                  HPI: [facility] is a 37y.o. year old female who presents for colon cancer screening      REVIEW OF SYSTEMS: Per the HPI, and otherwise unremarkable.     Historical Information   Past Medical History:   Diagnosis Date   • Allergic    • Allergic rhinitis    • Anxiety    • Arthritis    • Asthma    • Complex ovarian cyst     last assessed: 2014   • Condyloma acuminatum    • Depression    • Dysmenorrhea     last assessed: 2013   • Eating disorder 2020    in recovery BED   • H/O human papillomavirus infection    • Headache(784.0)    • History of ankle surgery    • Migraine    • Palpitations     last assessed: 2013   • TMJ dysfunction      Past Surgical History:   Procedure Laterality Date   • ANKLE FRACTURE SURGERY Left    • BREAST BIOPSY Left     mri guided    benign   • CERVICAL BIOPSY  W/ LOOP ELECTRODE EXCISION     •  SECTION      low transverse   • COSMETIC SURGERY      Breast reduction   • KNEE SURGERY     • MRI BREAST BIOPSY LEFT (ALL INCLUSIVE) Left 2022   • REDUCTION MAMMAPLASTY     • ROTATOR CUFF REPAIR  2014   • TUBAL LIGATION       Social History   Social History     Substance and Sexual Activity   Alcohol Use No     Social History     Substance and Sexual Activity   Drug Use Yes   • Types: Marijuana     Social History     Tobacco Use   Smoking Status Former   • Packs/day: 0.50   • Years: 10.00   • Total pack years: 5.00   • Types: Cigarettes   • Quit date: 2013   • Years since quitting: 10.2   Smokeless Tobacco Never   Tobacco Comments    former smoker (as per allscripts)     Family History   Problem Relation Age of Onset   • Breast cancer Mother         diagnosed at 37   • Cancer Mother         Breast Cancer   • Cancer Father         Amyloidosis   • Asthma Father    • Breast cancer Paternal Grandmother         diagnosed, death early 45s   • Cancer Paternal Grandmother         Breast cancer   • Breast cancer Paternal Aunt 46   • Cancer Maternal Grandfather         ureter/Kidney   • No Known Problems Daughter    • No Known Problems Paternal Aunt        Meds/Allergies       Current Outpatient Medications:   •  albuterol (Ventolin HFA) 90 mcg/act inhaler  •  cetirizine (ZyrTEC) 5 MG tablet  •  escitalopram (LEXAPRO) 20 mg tablet  •  montelukast (SINGULAIR) 10 mg tablet  •  bisacodyl (DULCOLAX) 5 mg EC tablet  •  hydrOXYzine HCL (ATARAX) 25 mg tablet  •  polyethylene glycol (GOLYTELY) 4000 mL solution    Current Facility-Administered Medications:   •  sodium chloride 0.9 % infusion, 125 mL/hr, Intravenous, Continuous    Allergies   Allergen Reactions   • Pollen Extract Allergic Rhinitis   • Codeine      Other reaction(s): Unknown Reaction   • Morphine      Other reaction(s): Other (See Comments)   • Demerol [Meperidine] Rash   • Other      Annotation - [id number]: mushroom       Objective     /77   Pulse 68   Temp 97.8 °F (36.6 °C) (Temporal)   Resp 14   Ht 5' 6" (1.676 m)   Wt 109 kg (240 lb 4.8 oz)   SpO2 98%   BMI 38.79 kg/m²       PHYSICAL EXAM    Gen: NAD  Head: NCAT  CV: RRR  CHEST: Clear  ABD: soft, NT/ND  EXT: no edema      ASSESSMENT/PLAN:  This is a 37y.o. year old female here for colonoscopy, and she is stable and optimized for her procedure.

## 2023-08-29 PROCEDURE — 88305 TISSUE EXAM BY PATHOLOGIST: CPT | Performed by: PATHOLOGY

## 2023-08-30 ENCOUNTER — ANNUAL EXAM (OUTPATIENT)
Dept: OBGYN CLINIC | Facility: CLINIC | Age: 43
End: 2023-08-30

## 2023-08-30 VITALS — DIASTOLIC BLOOD PRESSURE: 82 MMHG | SYSTOLIC BLOOD PRESSURE: 108 MMHG

## 2023-08-30 DIAGNOSIS — Z01.419 ROUTINE GYNECOLOGICAL EXAMINATION: Primary | ICD-10-CM

## 2023-08-30 DIAGNOSIS — Z11.51 SCREENING FOR HPV (HUMAN PAPILLOMAVIRUS): ICD-10-CM

## 2023-08-30 DIAGNOSIS — N95.1 VASOMOTOR SYMPTOMS DUE TO MENOPAUSE: ICD-10-CM

## 2023-08-30 DIAGNOSIS — Z98.890 HISTORY OF LOOP ELECTRICAL EXCISION PROCEDURE (LEEP): ICD-10-CM

## 2023-08-30 DIAGNOSIS — Z80.3 FAMILY HISTORY OF BREAST CANCER: ICD-10-CM

## 2023-08-30 DIAGNOSIS — Z12.31 ENCOUNTER FOR SCREENING MAMMOGRAM FOR MALIGNANT NEOPLASM OF BREAST: ICD-10-CM

## 2023-08-30 PROCEDURE — G0476 HPV COMBO ASSAY CA SCREEN: HCPCS | Performed by: PHYSICIAN ASSISTANT

## 2023-08-30 PROCEDURE — G0145 SCR C/V CYTO,THINLAYER,RESCR: HCPCS | Performed by: PHYSICIAN ASSISTANT

## 2023-08-30 NOTE — PROGRESS NOTES
Assessment   37 y.o. Talia Branham presenting for annual exam.     Plan   Diagnoses and all orders for this visit:    Routine gynecological examination  -     Liquid-based pap, screening    Normal findings on routine exam.  Encouraged 150 min of exercise per week. Reviewed balanced diet. Multivitamin encouraged   Breast awareness/SBE encouraged     Encounter for screening mammogram for malignant neoplasm of breast  -     Mammo screening bilateral w 3d & cad; Future    Screening for HPV (human papillomavirus)  -     Liquid-based pap, screening    Family history of breast cancer  -     Ambulatory referral to Surgical Oncology; Future  Family hx of breast cancer: mother, PGM, paternal aunt   Neg genetic testing. Last Mammogram 5/13/2023 - BIRADS 1 neg, tissue density - almost entirely fatty, LTC 24.57. Interested in f/u with breast specialist for discussion on annual breast MRI and risk reduction    Vasomotor symptoms due to menopause  We reviewed common signs and sx perimenopause as well as expectant management. Aware of sx to report. Recommended coconut oil or silicone based lubricant for vaginal dryness. Reviewed tx options for VM sx. Declines intervention at this time. Can try OTC supplements and conservative therapies VM sx. To report back if these measures are not relieving or with concerns, as would recommend f/u with Menopause clinic. History of loop electrical excision procedure (LEEP)  2001 - normal pap smears reported since       Pap - done today  Mammo slip given    Colonoscopy due 2030      RTO one year for yearly exam or sooner as needed. __________________________________________________________________    Dot Meckel is a 37 y.o. Talia Branham presenting for annual exam. She is without complaint and does not want STD testing today.      SCREENING  Last Pap: 3/13/2019 NILM/hpv neg   Last Mammo: 05/13/2023 BIRADS 1 - Negative  Last Colonoscopy: 08/25/2023 7 year recall GYN    Periods are regular, monthly, lasting 5 days. Changes super plus / ultra tampon q 2 hr. Heavy x 2 days, then decreasing. Dysmenorrhea: mild, occurring first 1-2 days of flow. Cyclic symptoms include diarrhea  Menopausal sx: + vm sx     Sexually active: Yes - single partner -   Contraception: tubal ligation    Hx Abnormal pap: LEEP in   We reviewed ASCCP guidelines for Pap testing today. Gardasil: She has not completed the Gardasil series. Denies vaginal discharge, itching, odor, dyspareunia, pelvic pain and vulvar/vaginal symptoms    OB         Complaints: denies   Denies urgency, frequency, hematuria, leakage / change in stream, difficulty urinating. BREAST  Complaints: denies   Denies: breast lump, breast tenderness, nipple discharge, skin color change, and skin lesion(s)    Pertinent Family Hx:   Family hx of breast cancer: mother, PGM, paternal aunt   Family hx of ovarian cancer: no  Family hx of colon cancer: no    Ignacia Barthel has had negative genetic testing - 2023     GENERAL  PMH reviewed/updated and is as below. Patient does follow with a PCP.       Past Medical History:   Diagnosis Date   • Allergic    • Allergic rhinitis    • Anxiety    • Arthritis    • Asthma    • Complex ovarian cyst     last assessed: 2014   • Condyloma acuminatum    • Depression    • Dysmenorrhea     last assessed: 2013   • Eating disorder 2020    in recovery BED   • H/O human papillomavirus infection    • Headache(784.0)    • History of ankle surgery    • Migraine    • Palpitations     last assessed: 2013   • TMJ dysfunction    • Varicella May 1985       Past Surgical History:   Procedure Laterality Date   • ANKLE FRACTURE SURGERY Left    • BREAST BIOPSY Left     mri guided    benign   • CERVICAL BIOPSY  W/ LOOP ELECTRODE EXCISION     •  SECTION      low transverse   • COSMETIC SURGERY      Breast reduction   • KNEE SURGERY     • MRI BREAST BIOPSY LEFT (ALL INCLUSIVE) Left 12/23/2022   • REDUCTION MAMMAPLASTY  2008   • ROTATOR CUFF REPAIR  11/28/2014   • TUBAL LIGATION           Current Outpatient Medications:   •  albuterol (Ventolin HFA) 90 mcg/act inhaler, Inhale 2 puffs every 6 (six) hours as needed for wheezing or shortness of breath, Disp: 18 g, Rfl: 1  •  cetirizine (ZyrTEC) 5 MG tablet, Take 10 mg by mouth daily, Disp: , Rfl:   •  escitalopram (LEXAPRO) 20 mg tablet, Take 1 tablet (20 mg total) by mouth daily, Disp: 90 tablet, Rfl: 1  •  hydrOXYzine HCL (ATARAX) 25 mg tablet, Take 1 tablet (25 mg total) by mouth daily at bedtime as needed for anxiety, Disp: 90 tablet, Rfl: 0  •  montelukast (SINGULAIR) 10 mg tablet, Take 1 tablet (10 mg total) by mouth daily at bedtime, Disp: 90 tablet, Rfl: 1  •  bisacodyl (DULCOLAX) 5 mg EC tablet, Take as directed by GI Office. (Patient not taking: Reported on 8/30/2023), Disp: 2 tablet, Rfl: 0    Allergies   Allergen Reactions   • Pollen Extract Allergic Rhinitis   • Codeine      Other reaction(s): Unknown Reaction   • Morphine      Other reaction(s):  Other (See Comments)   • Demerol [Meperidine] Rash   • Other      Annotation - [id number]: mushroom       Social History     Socioeconomic History   • Marital status: /Civil Union     Spouse name: Not on file   • Number of children: Not on file   • Years of education: Not on file   • Highest education level: Not on file   Occupational History   • Not on file   Tobacco Use   • Smoking status: Former     Packs/day: 0.50     Years: 10.00     Total pack years: 5.00     Types: Cigarettes     Quit date: 5/18/2013     Years since quitting: 10.2   • Smokeless tobacco: Never   • Tobacco comments:     former smoker (as per allscripts)   Vaping Use   • Vaping Use: Never used   Substance and Sexual Activity   • Alcohol use: No   • Drug use: Yes     Types: Marijuana     Comment: medical marijuana   • Sexual activity: Yes     Partners: Male     Birth control/protection: Female Sterilization   Other Topics Concern   • Not on file   Social History Narrative    Always uses seat belt    Denied: history of daily caffeinated coffee consumption    Exercises strenuously less than 3 times a week     Social Determinants of Health     Financial Resource Strain: Not on file   Food Insecurity: Not on file   Transportation Needs: Not on file   Physical Activity: Not on file   Stress: Not on file   Social Connections: Not on file   Intimate Partner Violence: Not on file   Housing Stability: Not on file       Review of Systems     ROS:  Constitutional: Negative for fatigue and unexpected weight change. Respiratory: Negative for cough and shortness of breath. Cardiovascular: Negative for chest pain and palpitations. Gastrointestinal: Negative for abdominal pain and change in bowel habits  Breasts:  Negative, other than as noted above. Genitourinary: Negative, other than as noted above. Psychiatric: Negative for mood difficulties. Objective      /82 (BP Location: Left arm, Patient Position: Sitting, Cuff Size: Large)   LMP 08/22/2023 (Exact Date)     Physical Examination:    Patient appears well and is not in distress  Breasts are symmetrical without mass, tenderness, nipple discharge, skin changes or adenopathy. Abdomen is soft and nontender without masses. External genitals are normal without lesions or rashes. Urethral meatus and urethra are normal  Bladder is normal to palpation  Vagina is normal without discharge or bleeding. Cervix is normal without discharge or lesion. Uterus is normal, mobile, nontender without palpable mass. Adnexa are normal, nontender, without palpable mass.

## 2023-08-30 NOTE — PATIENT INSTRUCTIONS
Prophylactic NSAID therapy for Painful or Heavy menses     Ibuprofen 600 mg (3 tablets) every 6-8 hours OR Naproxen 500 mg every 12 hours beginning 2 days prior to onset of menses and continued through the first 2-3 days of menses to reduce pain and bleeding. Make sure you take consistently or it will not be as effective in treating symptoms. You need to both of these medications with food to decrease stomach upset and irritation to your stomach.     This therapy is proven to reduce you flow and cramping by 50 %    Life style changes that have a positive effect on painful and heavy periods are as follows   Daily physical exercise    Increase fiber -- fresh fruits and vegetables in your diet    Increase daily water intake    Heating pads (do not apply directly to skin, apply over clothing or towel)   Warm Baths   Relaxation techniques, meditation, massage, yoga and mindfulness

## 2023-08-31 ENCOUNTER — OFFICE VISIT (OUTPATIENT)
Dept: FAMILY MEDICINE CLINIC | Facility: CLINIC | Age: 43
End: 2023-08-31
Payer: COMMERCIAL

## 2023-08-31 ENCOUNTER — TELEPHONE (OUTPATIENT)
Dept: HEMATOLOGY ONCOLOGY | Facility: CLINIC | Age: 43
End: 2023-08-31

## 2023-08-31 VITALS
TEMPERATURE: 97.2 F | OXYGEN SATURATION: 98 % | SYSTOLIC BLOOD PRESSURE: 120 MMHG | BODY MASS INDEX: 38.79 KG/M2 | HEART RATE: 89 BPM | HEIGHT: 66 IN | DIASTOLIC BLOOD PRESSURE: 66 MMHG

## 2023-08-31 DIAGNOSIS — Z00.00 ANNUAL PHYSICAL EXAM: Primary | ICD-10-CM

## 2023-08-31 DIAGNOSIS — D64.9 ANEMIA, UNSPECIFIED TYPE: ICD-10-CM

## 2023-08-31 DIAGNOSIS — E78.5 DYSLIPIDEMIA: ICD-10-CM

## 2023-08-31 DIAGNOSIS — F41.9 ANXIETY: ICD-10-CM

## 2023-08-31 DIAGNOSIS — J45.40 MODERATE PERSISTENT ASTHMA WITHOUT COMPLICATION: ICD-10-CM

## 2023-08-31 DIAGNOSIS — Z11.59 ENCOUNTER FOR HEPATITIS C SCREENING TEST FOR LOW RISK PATIENT: ICD-10-CM

## 2023-08-31 DIAGNOSIS — F33.1 MODERATE EPISODE OF RECURRENT MAJOR DEPRESSIVE DISORDER (HCC): ICD-10-CM

## 2023-08-31 PROBLEM — I95.1 ORTHOSTATIC HYPOTENSION: Status: RESOLVED | Noted: 2019-09-30 | Resolved: 2023-08-31

## 2023-08-31 PROCEDURE — 99214 OFFICE O/P EST MOD 30 MIN: CPT | Performed by: FAMILY MEDICINE

## 2023-08-31 PROCEDURE — 99396 PREV VISIT EST AGE 40-64: CPT | Performed by: FAMILY MEDICINE

## 2023-08-31 RX ORDER — MONTELUKAST SODIUM 10 MG/1
10 TABLET ORAL
Qty: 90 TABLET | Refills: 3 | Status: SHIPPED | OUTPATIENT
Start: 2023-08-31

## 2023-08-31 RX ORDER — ESCITALOPRAM OXALATE 20 MG/1
20 TABLET ORAL DAILY
Qty: 90 TABLET | Refills: 3 | Status: SHIPPED | OUTPATIENT
Start: 2023-08-31

## 2023-08-31 RX ORDER — HYDROXYZINE HYDROCHLORIDE 25 MG/1
25 TABLET, FILM COATED ORAL
Qty: 90 TABLET | Refills: 0 | Status: SHIPPED | OUTPATIENT
Start: 2023-08-31

## 2023-08-31 NOTE — PROGRESS NOTES
ADULT ANNUAL [facility] PRIMARY CARE    NAME: Gage Davidson  AGE: 37 y.o. SEX: female  : 1980     DATE: 2023     Assessment and Plan:     Problem List Items Addressed This Visit        Respiratory    Asthma    Relevant Medications    montelukast (SINGULAIR) 10 mg tablet       Other    Depression    Relevant Medications    escitalopram (LEXAPRO) 20 mg tablet    hydrOXYzine HCL (ATARAX) 25 mg tablet    Anxiety    Relevant Medications    hydrOXYzine HCL (ATARAX) 25 mg tablet   Other Visit Diagnoses     Annual physical exam    -  Primary    Relevant Orders    CBC and differential    Iron Panel (Includes Ferritin, Iron Sat%, Iron, and TIBC)    Comprehensive metabolic panel    Dyslipidemia        Relevant Orders    Lipid Panel with Direct LDL reflex    Anemia, unspecified type        Relevant Orders    Vitamin B12    Folate    Encounter for hepatitis C screening test for low risk patient        Relevant Orders    Hepatitis C antibody        - Satisfactory physical examination  - Will screen for Hepatitis C per USPSTF recommendations to test adults aged 25 to 78 years. - Patient up to date with breast, colon and cervical cancer screening   - Continue current regimen of Lexparo 20 mg daily and hydroxyzine 25 mg PRN  - Asthma stable on Singulair 10 mg daily   - Recent MRI reviewed which showed Heterogeneous decreased T1/increased T2 marrow signal in keeping with hematopoietic reconversion. This can be seen in anemia, smoking, medication effect or chronic diseases. Per chart review patient was mildly anemic in , will obtain repeat CBC, iron panel, B12 and folate. - Return to office in 6 months      Appropriate education was printed on patient's after visit summary. Counseling:  Dental Health: discussed importance of regular tooth brushing, flossing, and dental visits.   Injury prevention: discussed safety/seat belts, safety helmets, smoke detectors, carbon dioxide detectors, and smoking near bedding or upholstery. · Exercise: the importance of regular exercise/physical activity was discussed. Recommend exercise 3-5 times per week for at least 30 minutes. Return in about 6 months (around 2024) for Next scheduled follow up. Chief Complaint:     Chief Complaint   Patient presents with   • Physical Exam      History of Present Illness:     Adult Annual Physical   Aris Gallego is a very pleasant 37year old female with a past medical history of anxiety and depression who presents today for a comprehensive physical exam. Patient is currently following with Orthopedics at [address] [place] [address] due to persistent left foot pain, numbness and tingling which initially started in  following calcaneal osteotomy and tendon transfer. She had an MRI of the left sciatic nerve and is due to have an EMG tomorrow. Apart from that she feels well. Her anxiety is under control with current medication regimen of Lexparo 20mg and hydroxyzine PRN. She continues to see her therapist every week. Diet and Physical Activity  · Diet/Nutrition: well balanced diet. 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     Depression Screening  PHQ-2/9 Depression Screening    Little interest or pleasure in doing things: 0 - not at all  Feeling down, depressed, or hopeless: 0 - not at all  Trouble falling or staying asleep, or sleeping too much: 0 - not at all  Feeling tired or having little energy: 1 - several days  Poor appetite or overeatin - not at all  Feeling bad about yourself - or that you are a failure or have let yourself or your family down: 0 - not at all  Trouble concentrating on things, such as reading the newspaper or watching television: 0 - not at all  Moving or speaking so slowly that other people could have noticed.  Or the opposite - being so fidgety or restless that you have been moving around a lot more than usual: 0 - not at all  Thoughts that you would be better off dead, or of hurting yourself in some way: 0 - not at all       [address]  · Sleep: 6 1/2 hours to 8 hours of sleep on average. · Hearing: Does not require use of hearing aids. · Vision: goes for regular eye exams and wears glasses. · Dental: regular dental visits. /GYN Health  · Patient is: premenopausal  · Last menstrual period: Patient's last menstrual period was 2023 (exact date). · Contraceptive method: None. Review of Systems:     Review of Systems   Constitutional: Negative. HENT: Negative. Eyes: Negative. Respiratory: Negative. Cardiovascular: Negative. Gastrointestinal: Negative. Genitourinary: Negative. Musculoskeletal: Positive for arthralgias and gait problem. Skin: Negative. Psychiatric/Behavioral: Negative.        Past Medical History:     Past Medical History:   Diagnosis Date   • Allergic    • Allergic rhinitis    • Anxiety    • Arthritis    • Asthma    • Complex ovarian cyst     last assessed: 2014   • Condyloma acuminatum    • Depression    • Dysmenorrhea     last assessed: 2013   • Eating disorder 2020    in recovery BED   • H/O human papillomavirus infection    • Headache(784.0)    • History of ankle surgery    • Migraine    • Palpitations     last assessed: 2013   • TMJ dysfunction    • Varicella May 1985      Past Surgical History:     Past Surgical History:   Procedure Laterality Date   • ANKLE FRACTURE SURGERY Left    • BREAST BIOPSY Left     mri guided    benign   • CERVICAL BIOPSY  W/ LOOP ELECTRODE EXCISION     •  SECTION      low transverse   • COSMETIC SURGERY      Breast reduction   • KNEE SURGERY     • MRI BREAST BIOPSY LEFT (ALL INCLUSIVE) Left 2022   • REDUCTION MAMMAPLASTY     • ROTATOR CUFF REPAIR  2014   • TUBAL LIGATION        Social History:     Social History     Socioeconomic History   • Marital status: /Civil [employer]     Spouse name: None   • Number of children: None   • Years of education: None   • Highest education level: None   Occupational History   • None   Tobacco Use   • Smoking status: Former     Packs/day: 0.50     Years: 10.00     Total pack years: 5.00     Types: Cigarettes     Quit date: 5/18/2013     Years since quitting: 10.2   • Smokeless tobacco: Never   • Tobacco comments:     former smoker (as per allscripts)   Vaping Use   • Vaping Use: Never used   Substance and Sexual Activity   • Alcohol use: No   • Drug use: Yes     Types: Marijuana     Comment: medical marijuana   • Sexual activity: Yes     Partners: Male     Birth control/protection: Female Sterilization   Other Topics Concern   • None   Social History Narrative    Always uses seat belt    Denied: history of daily caffeinated coffee consumption    Exercises strenuously less than 3 times a week     Social Determinants of Health     Financial Resource Strain: Not on file   Food Insecurity: Not on file   Transportation Needs: Not on file   Physical Activity: Not on file   Stress: Not on file   Social Connections: Not on file   Intimate Partner Violence: Not on file   Housing Stability: Not on file      Family History:     Family History   Problem Relation Age of Onset   • Breast cancer Mother         diagnosed at 37   • Cancer Mother         Breast Cancer   • Cancer Father         Amyloidosis   • Asthma Father    • Breast cancer Paternal Grandmother         diagnosed, death at 50   • Cancer Paternal Grandmother         Breast cancer   • Breast cancer Paternal Aunt 46   • Cancer Maternal Grandfather         ureter/Kidney   • No Known Problems Daughter    • No Known Problems Paternal Aunt       Current Medications:     Current Outpatient Medications   Medication Sig Dispense Refill   • albuterol (Ventolin HFA) 90 mcg/act inhaler Inhale 2 puffs every 6 (six) hours as needed for wheezing or shortness of breath 18 g 1   • cetirizine (ZyrTEC) 5 MG tablet Take 10 mg by mouth daily     • escitalopram (LEXAPRO) 20 mg tablet Take 1 tablet (20 mg total) by mouth daily 90 tablet 3   • hydrOXYzine HCL (ATARAX) 25 mg tablet Take 1 tablet (25 mg total) by mouth daily at bedtime as needed for anxiety 90 tablet 0   • montelukast (SINGULAIR) 10 mg tablet Take 1 tablet (10 mg total) by mouth daily at bedtime 90 tablet 3     No current facility-administered medications for this visit. Allergies: Allergies   Allergen Reactions   • Pollen Extract Allergic Rhinitis   • Codeine      Other reaction(s): Unknown Reaction   • Morphine      Other reaction(s): Other (See Comments)   • Demerol [Meperidine] Rash   • Other      Annotation - [id number]: mushroom      Physical Exam:     /66 (BP Location: Left arm, Patient Position: Sitting, Cuff Size: Large)   Pulse 89   Temp (!) 97.2 °F (36.2 °C) (Temporal)   Ht 5' 6" (1.676 m)   LMP 08/22/2023 (Exact Date)   SpO2 98%   BMI 38.79 kg/m²     Physical Exam  Constitutional:       General: She is not in acute distress. Appearance: She is not ill-appearing. HENT:      Head: Normocephalic and atraumatic. Mouth/Throat:      Mouth: Mucous membranes are moist.      Pharynx: No oropharyngeal exudate or posterior oropharyngeal erythema. Eyes:      General:         Right eye: No discharge. Left eye: No discharge. Extraocular Movements: Extraocular movements intact. Pupils: Pupils are equal, round, and reactive to light. Cardiovascular:      Rate and Rhythm: Normal rate. Pulmonary:      Effort: Pulmonary effort is normal. No respiratory distress. Breath sounds: No wheezing. Abdominal:      General: Bowel sounds are normal. There is no distension. Palpations: Abdomen is soft. Tenderness: There is no abdominal tenderness. Musculoskeletal:      Right lower leg: No edema. Left lower leg: No edema. Neurological:      General: No focal deficit present.       Mental Status: She is alert. Motor: No weakness.       Coordination: Coordination normal.   Psychiatric:         Mood and Affect: Mood normal.         Behavior: Behavior normal.          MD Cody Holloway Kieran

## 2023-08-31 NOTE — TELEPHONE ENCOUNTER
I called Guillermo Chin in response to a referral that was received for patient to establish care with Surgical Oncology. Outreach was made to schedule a consultation. I left a voicemail explaining the reason for my call and advised patient to call [facility] at [phone]. Another attempt will be made to contact patient.

## 2023-09-06 ENCOUNTER — TELEPHONE (OUTPATIENT)
Dept: HEMATOLOGY ONCOLOGY | Facility: CLINIC | Age: 43
End: 2023-09-06

## 2023-09-06 NOTE — TELEPHONE ENCOUNTER
I called Annetta Vogt in response to a referral that was received for patient to establish care with Surgical Oncology. Outreach was made to schedule a consultation. I left a voicemail explaining the reason for my call and advised patient to call [facility] at [phone]. Another attempt will be made to contact patient.

## 2023-09-07 ENCOUNTER — TELEPHONE (OUTPATIENT)
Dept: HEMATOLOGY ONCOLOGY | Facility: CLINIC | Age: 43
End: 2023-09-07

## 2023-09-07 LAB
LAB AP GYN PRIMARY INTERPRETATION: NORMAL
Lab: NORMAL

## 2023-09-07 NOTE — TELEPHONE ENCOUNTER
I called Freddie Gallardo in response to a referral that was received for patient to establish care with Surgical Oncology. Outreach was made to schedule a consultation. I left a voicemail explaining the reason for my call and advised patient to call [facility] at [phone]. This is the third attempt to schedule patient unsuccessfully. The referral has been closed, a [employer] message has been sent to patient (if applicable) and a letter has been sent to the address on file.

## 2023-11-30 ENCOUNTER — APPOINTMENT (OUTPATIENT)
Dept: RADIOLOGY | Facility: MEDICAL CENTER | Age: 43
End: 2023-11-30
Payer: COMMERCIAL

## 2023-11-30 ENCOUNTER — OFFICE VISIT (OUTPATIENT)
Dept: URGENT CARE | Facility: MEDICAL CENTER | Age: 43
End: 2023-11-30
Payer: COMMERCIAL

## 2023-11-30 VITALS
RESPIRATION RATE: 18 BRPM | DIASTOLIC BLOOD PRESSURE: 82 MMHG | OXYGEN SATURATION: 99 % | SYSTOLIC BLOOD PRESSURE: 136 MMHG | HEART RATE: 72 BPM | TEMPERATURE: 97.4 F

## 2023-11-30 DIAGNOSIS — R05.9 COUGH, UNSPECIFIED TYPE: Primary | ICD-10-CM

## 2023-11-30 DIAGNOSIS — J45.901 EXACERBATION OF ASTHMA, UNSPECIFIED ASTHMA SEVERITY, UNSPECIFIED WHETHER PERSISTENT: ICD-10-CM

## 2023-11-30 DIAGNOSIS — R05.9 COUGH, UNSPECIFIED TYPE: ICD-10-CM

## 2023-11-30 PROCEDURE — 99213 OFFICE O/P EST LOW 20 MIN: CPT | Performed by: FAMILY MEDICINE

## 2023-11-30 PROCEDURE — 71046 X-RAY EXAM CHEST 2 VIEWS: CPT

## 2023-11-30 RX ORDER — ALBUTEROL SULFATE 2.5 MG/3ML
2.5 SOLUTION RESPIRATORY (INHALATION) ONCE
Status: COMPLETED | OUTPATIENT
Start: 2023-11-30 | End: 2023-11-30

## 2023-11-30 RX ORDER — LORATADINE 10 MG/1
10 TABLET ORAL DAILY
COMMUNITY

## 2023-11-30 RX ORDER — BENZONATATE 200 MG/1
200 CAPSULE ORAL 3 TIMES DAILY PRN
Qty: 20 CAPSULE | Refills: 0 | Status: SHIPPED | OUTPATIENT
Start: 2023-11-30

## 2023-11-30 RX ORDER — PREDNISONE 20 MG/1
TABLET ORAL
Qty: 18 TABLET | Refills: 0 | Status: SHIPPED | OUTPATIENT
Start: 2023-11-30

## 2023-11-30 RX ORDER — SENNOSIDES A AND B 8.6 MG/1
8.6 TABLET, FILM COATED ORAL
COMMUNITY
Start: 2023-11-01 | End: 2023-12-01

## 2023-11-30 RX ORDER — OXYCODONE HYDROCHLORIDE 5 MG/1
TABLET ORAL
COMMUNITY
Start: 2023-11-01

## 2023-11-30 RX ORDER — LORATADINE 10 MG/1
CAPSULE, LIQUID FILLED ORAL
COMMUNITY
Start: 2023-11-01

## 2023-11-30 RX ADMIN — ALBUTEROL SULFATE 2.5 MG: 2.5 SOLUTION RESPIRATORY (INHALATION) at 18:26

## 2023-11-30 NOTE — PATIENT INSTRUCTIONS
Patient given albuterol nebulizer treatment in the office. After treatment, patient has no noticeable improvement. Auscultation after nebulizer treatment reveals increased air entry, more pronounced rhonchi and continued inspiratory wheeze. Chest x-ray reveals no infiltrate or consolidation. Official radiology interpretation agrees with findings. I prescribed prednisone tapering dose from 60 down to 20 mg first dose to be taken tonight. I advised patient to resume albuterol inhaler, which she has at home 2 puffs every 6 hours for 7 days then taper as needed. Recommended patient's start over-the-counter plain Mucinex for expectoration. I also prescribed Tessalon Perles 200 mg every 8 hours. If symptoms persist or worsen she is to follow-up with primary care provider or go to the emergency room. Asthma   WHAT YOU NEED TO KNOW:   Asthma is a lung disease that makes breathing difficult. Chronic inflammation and reactions to triggers narrow the airways in the lungs. Asthma can become life-threatening if it is not managed. DISCHARGE INSTRUCTIONS:   Call your local emergency number (911 in the [address]) if:   You have severe shortness of breath. The skin around your neck and ribs pulls in with each breath. Your peak flow numbers are in the red zone of your AAP. Return to the emergency department if:   You have shortness of breath, even after you take your short-term medicine as directed. Your lips or nails turn blue or gray. Call your doctor or asthma specialist if:   You run out of medicine before your next refill is due. Your symptoms get worse. You need to take more medicine than usual to control your symptoms. You have questions or concerns about your condition or care. Medicines:   Medicines  may be used to decrease inflammation, open airways, and make it easier to breathe. Medicines may be inhaled, taken as a pill, or injected. Short-term medicines relieve your symptoms quickly. Long-term medicines are used to prevent future asthma attacks. Other medicines may be needed if your regular medicines are not able to prevent attacks. You may also need medicine to help control your allergies. Ask your healthcare provider for more information about any medicine you are given and how to take it safely. Take your medicine as directed. Contact your healthcare provider if you think your medicine is not helping or if you have side effects. Tell your provider if you are allergic to any medicine. Keep a list of the medicines, vitamins, and herbs you take. Include the amounts, and when and why you take them. Bring the list or the pill bottles to follow-up visits. Carry your medicine list with you in case of an emergency. Manage your symptoms and prevent future attacks: Follow your Asthma Action Plan (AAP). This is a written plan that you and your healthcare provider create. It explains which medicine you need and when to change doses if necessary. It also explains how you can monitor symptoms and use a peak flow meter. The meter measures how well your lungs are working. Manage other health conditions , such as allergies, acid reflux, and sleep apnea. Identify and avoid triggers. These may include pets, dust mites, mold, and cockroaches. Do not smoke or be around others who smoke. Nicotine and other chemicals in cigarettes and cigars can cause lung damage. Ask your healthcare provider for information if you currently smoke and need help to quit. E-cigarettes or smokeless tobacco still contain nicotine. Talk to your healthcare provider before you use these products. Ask about the flu vaccine. The flu can make your asthma worse. You may need a yearly flu shot. Follow up with your healthcare provider as directed: You will need to return to make sure your medicine is working and your symptoms are controlled.  You may be referred to an asthma or allergy specialist. Joshua Flynn may be asked to keep a record of your peak flow values and bring it with you to your appointments. Write down your questions so you remember to ask them during your visits. © Copyright Shannan Lawrence 2023 Information is for End User's use only and may not be sold, redistributed or otherwise used for commercial purposes. The above information is an  only. It is not intended as medical advice for individual conditions or treatments. Talk to your doctor, nurse or pharmacist before following any medical regimen to see if it is safe and effective for you.

## 2023-11-30 NOTE — PROGRESS NOTES
[facility]        NAME: Portillo Newman is a 37 y.o. female  : 1980    MRN: [id number]  DATE: 2023  TIME: 7:06 PM    Assessment and Plan   Cough, unspecified type [R05.9]  1. Cough, unspecified type  albuterol inhalation solution 2.5 mg    XR chest pa & lateral    benzonatate (TESSALON) 200 MG capsule    predniSONE 20 mg tablet      2. Exacerbation of asthma, unspecified asthma severity, unspecified whether persistent  albuterol inhalation solution 2.5 mg    XR chest pa & lateral    benzonatate (TESSALON) 200 MG capsule    predniSONE 20 mg tablet            Patient Instructions       Follow up with PCP in 3-5 days. Proceed to  ER if symptoms worsen. Chief Complaint     Chief Complaint   Patient presents with    Cough     Patient c/o cough and wheezing x 3-4 days          History of Present Illness       66-year-old female here today with complaint of cough and wheezing for the last 3 to 4 days. She has a known history of asthma which is generally well-controlled. However in the last 4 days she has used her rescue inhaler twice with no noticeable improvement. Complaining of tightness wheezing predominantly nonproductive cough which is worse upon arising in the morning. Denies any fever. Refers to some nasal congestion. Minimal postnasal drip. Denies sore throat. She has also developed some hoarseness and this timeframe. She has tried using some DayQuil with also no noticeable improvement Robitussin also been not effective. She informs me that you have any sick contacts she is having her daughter who had cold symptoms recently. Denies flulike symptoms such as fever body aches. Denies nausea, vomiting, diarrhea. Cough  This is a new problem. The current episode started in the past 7 days. The problem has been gradually worsening. The problem occurs every few minutes. The cough is Productive of sputum.  Associated symptoms include headaches, nasal congestion, shortness of breath and wheezing. Pertinent negatives include no chest pain, chills, ear congestion, ear pain, fever, heartburn, hemoptysis, myalgias, postnasal drip, rash, rhinorrhea, sore throat, sweats or weight loss. Nothing aggravates the symptoms. Review of Systems   Review of Systems   Constitutional:  Negative for chills, fever and weight loss. HENT:  Negative for ear pain, postnasal drip, rhinorrhea and sore throat. Respiratory:  Positive for cough, shortness of breath and wheezing. Negative for hemoptysis. Cardiovascular:  Negative for chest pain. Gastrointestinal:  Negative for heartburn. Musculoskeletal:  Negative for myalgias. Skin:  Negative for rash. Neurological:  Positive for headaches.          Current Medications       Current Outpatient Medications:     benzonatate (TESSALON) 200 MG capsule, Take 1 capsule (200 mg total) by mouth 3 (three) times a day as needed for cough, Disp: 20 capsule, Rfl: 0    Loratadine (Claritin) 10 MG CAPS, , Disp: , Rfl:     oxyCODONE (ROXICODONE) 5 immediate release tablet, , Disp: , Rfl:     predniSONE 20 mg tablet, 3 tablets once daily day 1 through 3 then 2 tablets daily day 4 through 6 then 1 tablet daily day 7 through 9., Disp: 18 tablet, Rfl: 0    senna (SENOKOT) 8.6 MG tablet, Take 8.6 mg by mouth, Disp: , Rfl:     albuterol (Ventolin HFA) 90 mcg/act inhaler, Inhale 2 puffs every 6 (six) hours as needed for wheezing or shortness of breath, Disp: 18 g, Rfl: 1    cetirizine (ZyrTEC) 5 MG tablet, Take 10 mg by mouth daily, Disp: , Rfl:     escitalopram (LEXAPRO) 20 mg tablet, Take 1 tablet (20 mg total) by mouth daily, Disp: 90 tablet, Rfl: 3    hydrOXYzine HCL (ATARAX) 25 mg tablet, Take 1 tablet (25 mg total) by mouth daily at bedtime as needed for anxiety, Disp: 90 tablet, Rfl: 0    loratadine (CLARITIN) 10 mg tablet, Take 10 mg by mouth daily, Disp: , Rfl:     montelukast (SINGULAIR) 10 mg tablet, Take 1 tablet (10 mg total) by mouth daily at bedtime, Disp: 90 tablet, Rfl: 3  No current facility-administered medications for this visit.     Current Allergies     Allergies as of 2023 - Reviewed 2023   Allergen Reaction Noted    Pollen extract Allergic Rhinitis 2015    Codeine  2009    Morphine  10/05/2008    Demerol [meperidine] Rash 2013    Other  2015            The following portions of the patient's history were reviewed and updated as appropriate: allergies, current medications, past family history, past medical history, past social history, past surgical history and problem list.     Past Medical History:   Diagnosis Date    Allergic     Allergic rhinitis     Anxiety     Arthritis     Asthma     Complex ovarian cyst     last assessed: 2014    Condyloma acuminatum     Depression     Dysmenorrhea     last assessed: 2013    Eating disorder 2020    in recovery BED    H/O human papillomavirus infection     Headache(784.0)     History of ankle surgery     Migraine     Palpitations     last assessed: 2013    TMJ dysfunction     Varicella May 1985       Past Surgical History:   Procedure Laterality Date    ANKLE FRACTURE SURGERY Left     BREAST BIOPSY Left     mri guided    benign    CERVICAL BIOPSY  W/ LOOP ELECTRODE EXCISION       SECTION      low transverse    COSMETIC SURGERY      Breast reduction    KNEE SURGERY      MRI BREAST BIOPSY LEFT (ALL INCLUSIVE) Left 2022    REDUCTION MAMMAPLASTY      ROTATOR CUFF REPAIR  2014    TUBAL LIGATION         Family History   Problem Relation Age of Onset    Breast cancer Mother         diagnosed at 37    Cancer Mother         Breast Cancer    Cancer Father         Amyloidosis    Asthma Father     Breast cancer Paternal Grandmother         diagnosed, death at 50    Cancer Paternal Grandmother         Breast cancer    Breast cancer Paternal Aunt 46    Cancer Maternal Grandfather         ureter/Kidney    No Known Problems Daughter No Known Problems Paternal Aunt          Medications have been verified. Objective   /82   Pulse 72   Temp (!) 97.4 °F (36.3 °C)   Resp 18   SpO2 99%   No LMP recorded. Physical Exam     Physical Exam  Vitals and nursing note reviewed. Constitutional:       Appearance: Normal appearance. HENT:      Nose:      Comments: Erythematous hypertrophic turbinates bilaterally     Mouth/Throat:      Mouth: Mucous membranes are moist.      Comments: Mild cobblestoning observed in the posterior pharynx. Cardiovascular:      Rate and Rhythm: Normal rate. Pulmonary:      Effort: Pulmonary effort is normal.      Breath sounds: No stridor. Wheezing and rhonchi present. Musculoskeletal:      Cervical back: Normal range of motion and neck supple. Neurological:      Mental Status: She is alert.

## 2023-12-18 ENCOUNTER — PATIENT MESSAGE (OUTPATIENT)
Dept: OBGYN CLINIC | Facility: CLINIC | Age: 43
End: 2023-12-18

## 2024-01-28 ENCOUNTER — HOSPITAL ENCOUNTER (EMERGENCY)
Facility: HOSPITAL | Age: 44
Discharge: HOME | End: 2024-01-29
Attending: EMERGENCY MEDICINE
Payer: COMMERCIAL

## 2024-01-28 ENCOUNTER — APPOINTMENT (EMERGENCY)
Dept: CT IMAGING | Facility: HOSPITAL | Age: 44
End: 2024-01-28
Payer: COMMERCIAL

## 2024-01-28 DIAGNOSIS — R11.0 NAUSEA: Primary | ICD-10-CM

## 2024-01-28 DIAGNOSIS — K57.32 SIGMOID DIVERTICULITIS: ICD-10-CM

## 2024-01-28 DIAGNOSIS — R19.7 DIARRHEA: ICD-10-CM

## 2024-01-28 LAB
ALBUMIN SERPL BCP-MCNC: 4 G/DL (ref 3.5–5)
ALP SERPL-CCNC: 49 U/L (ref 34–104)
ALT SERPL W P-5'-P-CCNC: 27 U/L (ref 7–52)
ANION GAP SERPL CALCULATED.3IONS-SCNC: 7 MMOL/L
AST SERPL W P-5'-P-CCNC: 22 U/L (ref 13–39)
BACTERIA UR QL AUTO: NORMAL /HPF
BASOPHILS # BLD AUTO: 0.02 THOUSANDS/ÂΜL (ref 0–0.1)
BASOPHILS NFR BLD AUTO: 0 % (ref 0–1)
BILIRUB SERPL-MCNC: 0.34 MG/DL (ref 0.2–1)
BILIRUB UR QL STRIP: NEGATIVE
BUN SERPL-MCNC: 10 MG/DL (ref 5–25)
CALCIUM SERPL-MCNC: 8.9 MG/DL (ref 8.4–10.2)
CHLORIDE SERPL-SCNC: 103 MMOL/L (ref 96–108)
CLARITY UR: CLEAR
CO2 SERPL-SCNC: 27 MMOL/L (ref 21–32)
COLOR UR: YELLOW
CREAT SERPL-MCNC: 0.74 MG/DL (ref 0.6–1.3)
EOSINOPHIL # BLD AUTO: 0.2 THOUSAND/ÂΜL (ref 0–0.61)
EOSINOPHIL NFR BLD AUTO: 3 % (ref 0–6)
ERYTHROCYTE [DISTWIDTH] IN BLOOD BY AUTOMATED COUNT: 11.9 % (ref 11.6–15.1)
EXT PREGNANCY TEST URINE: NEGATIVE
EXT. CONTROL: NORMAL
GFR SERPL CREATININE-BSD FRML MDRD: 98 ML/MIN/1.73SQ M
GLUCOSE SERPL-MCNC: 123 MG/DL (ref 65–140)
GLUCOSE UR STRIP-MCNC: NEGATIVE MG/DL
HCT VFR BLD AUTO: 34.5 % (ref 34.8–46.1)
HGB BLD-MCNC: 11.8 G/DL (ref 11.5–15.4)
HGB UR QL STRIP.AUTO: ABNORMAL
IMM GRANULOCYTES # BLD AUTO: 0.02 THOUSAND/UL (ref 0–0.2)
IMM GRANULOCYTES NFR BLD AUTO: 0 % (ref 0–2)
KETONES UR STRIP-MCNC: NEGATIVE MG/DL
LEUKOCYTE ESTERASE UR QL STRIP: NEGATIVE
LIPASE SERPL-CCNC: 10 U/L (ref 11–82)
LYMPHOCYTES # BLD AUTO: 1.99 THOUSANDS/ÂΜL (ref 0.6–4.47)
LYMPHOCYTES NFR BLD AUTO: 25 % (ref 14–44)
MCH RBC QN AUTO: 31.5 PG (ref 26.8–34.3)
MCHC RBC AUTO-ENTMCNC: 34.2 G/DL (ref 31.4–37.4)
MCV RBC AUTO: 92 FL (ref 82–98)
MONOCYTES # BLD AUTO: 0.56 THOUSAND/ÂΜL (ref 0.17–1.22)
MONOCYTES NFR BLD AUTO: 7 % (ref 4–12)
NEUTROPHILS # BLD AUTO: 5.09 THOUSANDS/ÂΜL (ref 1.85–7.62)
NEUTS SEG NFR BLD AUTO: 65 % (ref 43–75)
NITRITE UR QL STRIP: NEGATIVE
NON-SQ EPI CELLS URNS QL MICRO: NORMAL /HPF
NRBC BLD AUTO-RTO: 0 /100 WBCS
PH UR STRIP.AUTO: 6.5 [PH] (ref 4.5–8)
PLATELET # BLD AUTO: 212 THOUSANDS/UL (ref 149–390)
PMV BLD AUTO: 9.7 FL (ref 8.9–12.7)
POTASSIUM SERPL-SCNC: 3.3 MMOL/L (ref 3.5–5.3)
PROT SERPL-MCNC: 6.8 G/DL (ref 6.4–8.4)
PROT UR STRIP-MCNC: NEGATIVE MG/DL
RBC # BLD AUTO: 3.75 MILLION/UL (ref 3.81–5.12)
RBC #/AREA URNS AUTO: NORMAL /HPF
SODIUM SERPL-SCNC: 137 MMOL/L (ref 135–147)
SP GR UR STRIP.AUTO: 1.01 (ref 1–1.03)
UROBILINOGEN UR QL STRIP.AUTO: 0.2 E.U./DL
WBC # BLD AUTO: 7.88 THOUSAND/UL (ref 4.31–10.16)
WBC #/AREA URNS AUTO: NORMAL /HPF

## 2024-01-28 PROCEDURE — 85025 COMPLETE CBC W/AUTO DIFF WBC: CPT | Performed by: PHYSICIAN ASSISTANT

## 2024-01-28 PROCEDURE — 36415 COLL VENOUS BLD VENIPUNCTURE: CPT | Performed by: PHYSICIAN ASSISTANT

## 2024-01-28 PROCEDURE — 96375 TX/PRO/DX INJ NEW DRUG ADDON: CPT

## 2024-01-28 PROCEDURE — 80053 COMPREHEN METABOLIC PANEL: CPT | Performed by: PHYSICIAN ASSISTANT

## 2024-01-28 PROCEDURE — G1004 CDSM NDSC: HCPCS

## 2024-01-28 PROCEDURE — 99285 EMERGENCY DEPT VISIT HI MDM: CPT | Performed by: PHYSICIAN ASSISTANT

## 2024-01-28 PROCEDURE — 74177 CT ABD & PELVIS W/CONTRAST: CPT

## 2024-01-28 PROCEDURE — 96374 THER/PROPH/DIAG INJ IV PUSH: CPT

## 2024-01-28 PROCEDURE — 99284 EMERGENCY DEPT VISIT MOD MDM: CPT

## 2024-01-28 PROCEDURE — 81001 URINALYSIS AUTO W/SCOPE: CPT

## 2024-01-28 PROCEDURE — 83690 ASSAY OF LIPASE: CPT | Performed by: PHYSICIAN ASSISTANT

## 2024-01-28 PROCEDURE — 81025 URINE PREGNANCY TEST: CPT | Performed by: PHYSICIAN ASSISTANT

## 2024-01-28 RX ORDER — ONDANSETRON 2 MG/ML
4 INJECTION INTRAMUSCULAR; INTRAVENOUS ONCE
Status: COMPLETED | OUTPATIENT
Start: 2024-01-28 | End: 2024-01-28

## 2024-01-28 RX ADMIN — MORPHINE SULFATE 2 MG: 2 INJECTION, SOLUTION INTRAMUSCULAR; INTRAVENOUS at 22:14

## 2024-01-28 RX ADMIN — ONDANSETRON 4 MG: 2 INJECTION INTRAMUSCULAR; INTRAVENOUS at 21:58

## 2024-01-28 RX ADMIN — IOHEXOL 100 ML: 350 INJECTION, SOLUTION INTRAVENOUS at 23:51

## 2024-01-28 NOTE — Clinical Note
Maura Wong was seen and treated in our emergency department on 1/28/2024.                Diagnosis: seen in ED    Maura  .    She may return on this date: 01/31/2024         If you have any questions or concerns, please don't hesitate to call.      Rainer Adams PA-C    ______________________________           _______________          _______________  Hospital Representative                              Date                                Time

## 2024-01-29 VITALS
BODY MASS INDEX: 39.14 KG/M2 | HEART RATE: 80 BPM | TEMPERATURE: 98.9 F | OXYGEN SATURATION: 95 % | RESPIRATION RATE: 16 BRPM | WEIGHT: 242.51 LBS | SYSTOLIC BLOOD PRESSURE: 123 MMHG | DIASTOLIC BLOOD PRESSURE: 68 MMHG

## 2024-01-29 RX ORDER — POTASSIUM CHLORIDE 20 MEQ/1
40 TABLET, EXTENDED RELEASE ORAL ONCE
Status: COMPLETED | OUTPATIENT
Start: 2024-01-29 | End: 2024-01-29

## 2024-01-29 RX ORDER — ONDANSETRON 4 MG/1
4 TABLET, ORALLY DISINTEGRATING ORAL EVERY 6 HOURS PRN
Qty: 10 TABLET | Refills: 0 | Status: SHIPPED | OUTPATIENT
Start: 2024-01-29

## 2024-01-29 RX ORDER — AMOXICILLIN AND CLAVULANATE POTASSIUM 875; 125 MG/1; MG/1
1 TABLET, FILM COATED ORAL EVERY 12 HOURS
Qty: 20 TABLET | Refills: 0 | Status: SHIPPED | OUTPATIENT
Start: 2024-01-29 | End: 2024-02-08

## 2024-01-29 RX ORDER — AMOXICILLIN AND CLAVULANATE POTASSIUM 875; 125 MG/1; MG/1
1 TABLET, FILM COATED ORAL ONCE
Status: COMPLETED | OUTPATIENT
Start: 2024-01-29 | End: 2024-01-29

## 2024-01-29 RX ORDER — ONDANSETRON 4 MG/1
4 TABLET, ORALLY DISINTEGRATING ORAL ONCE
Status: COMPLETED | OUTPATIENT
Start: 2024-01-29 | End: 2024-01-29

## 2024-01-29 RX ORDER — OXYCODONE HYDROCHLORIDE AND ACETAMINOPHEN 5; 325 MG/1; MG/1
1 TABLET ORAL ONCE
Status: COMPLETED | OUTPATIENT
Start: 2024-01-29 | End: 2024-01-29

## 2024-01-29 RX ADMIN — POTASSIUM CHLORIDE 40 MEQ: 1500 TABLET, EXTENDED RELEASE ORAL at 01:33

## 2024-01-29 RX ADMIN — AMOXICILLIN AND CLAVULANATE POTASSIUM 1 TABLET: 875; 125 TABLET, FILM COATED ORAL at 01:36

## 2024-01-29 RX ADMIN — OXYCODONE AND ACETAMINOPHEN 1 TABLET: 5; 325 TABLET ORAL at 01:34

## 2024-01-29 RX ADMIN — ONDANSETRON 4 MG: 4 TABLET, ORALLY DISINTEGRATING ORAL at 01:23

## 2024-01-29 NOTE — ED PROVIDER NOTES
History  Chief Complaint   Patient presents with    Nausea     Pt reports nausea and loss of appetite for a few days.  +lower abd pain.  +passing mucus with bowel movements and watery bowel movements tonight     This is a 43-year-old female patient for the last 2 to 3 days has had some nausea without vomiting lower abdominal pain right side being the greatest diarrhea with mucus no blood.  No fever chills headache blurred vision no vision no cough congestion or sore throat no chest pain or shortness of breath.  Nothing makes his better or worse she does state that her car ride over here was uncomfortable due to all the bumps.  Taking nothing over-the-counter nothing makes it better or worse.  Differential diagnose includes not limited to appendicitis, epiploic appendagitis, mesenteric adenitis, diverticulitis, colitis, gastroenteritis        Prior to Admission Medications   Prescriptions Last Dose Informant Patient Reported? Taking?   Loratadine (Claritin) 10 MG CAPS   Yes No   albuterol (Ventolin HFA) 90 mcg/act inhaler  Self No No   Sig: Inhale 2 puffs every 6 (six) hours as needed for wheezing or shortness of breath   benzonatate (TESSALON) 200 MG capsule   No No   Sig: Take 1 capsule (200 mg total) by mouth 3 (three) times a day as needed for cough   cetirizine (ZyrTEC) 5 MG tablet  Self Yes No   Sig: Take 10 mg by mouth daily   escitalopram (LEXAPRO) 20 mg tablet   No No   Sig: Take 1 tablet (20 mg total) by mouth daily   hydrOXYzine HCL (ATARAX) 25 mg tablet   No No   Sig: Take 1 tablet (25 mg total) by mouth daily at bedtime as needed for anxiety   loratadine (CLARITIN) 10 mg tablet   Yes No   Sig: Take 10 mg by mouth daily   montelukast (SINGULAIR) 10 mg tablet   No No   Sig: Take 1 tablet (10 mg total) by mouth daily at bedtime   oxyCODONE (ROXICODONE) 5 immediate release tablet   Yes No   predniSONE 20 mg tablet   No No   Sig: 3 tablets once daily day 1 through 3 then 2 tablets daily day 4 through 6 then 1  tablet daily day 7 through 9.   senna (SENOKOT) 8.6 MG tablet   Yes No   Sig: Take 8.6 mg by mouth      Facility-Administered Medications: None       Past Medical History:   Diagnosis Date    Allergic     Allergic rhinitis     Anxiety     Arthritis     Asthma     Complex ovarian cyst     last assessed: 2014    Condyloma acuminatum     Depression     Dysmenorrhea     last assessed: 2013    Eating disorder 2020    in recovery BED    H/O human papillomavirus infection     Headache(784.0)     History of ankle surgery     Migraine     Palpitations     last assessed: 2013    TMJ dysfunction     Varicella May 1985       Past Surgical History:   Procedure Laterality Date    ANKLE FRACTURE SURGERY Left     BREAST BIOPSY Left     mri guided    benign    CERVICAL BIOPSY  W/ LOOP ELECTRODE EXCISION       SECTION      low transverse    COSMETIC SURGERY      Breast reduction    KNEE SURGERY      MRI BREAST BIOPSY LEFT (ALL INCLUSIVE) Left 2022    REDUCTION MAMMAPLASTY      ROTATOR CUFF REPAIR  2014    TUBAL LIGATION         Family History   Problem Relation Age of Onset    Breast cancer Mother 43        diagnosed at 43    Cancer Mother         Breast Cancer    Cancer Father         Amyloidosis    Asthma Father     Breast cancer Paternal Grandmother 42        diagnosed, death at 48    Cancer Paternal Grandmother         Breast cancer    Breast cancer Paternal Aunt 52    Cancer Maternal Grandfather         ureter/Kidney    No Known Problems Daughter     No Known Problems Paternal Aunt      I have reviewed and agree with the history as documented.    E-Cigarette/Vaping    E-Cigarette Use Current Some Day User      E-Cigarette/Vaping Substances    Nicotine No     THC No     CBD No     Flavoring No     Other No     Unknown No      Social History     Tobacco Use    Smoking status: Former     Current packs/day: 0.00     Average packs/day: 0.5 packs/day for 10.0 years (5.0 ttl  pk-yrs)     Types: Cigarettes     Start date: 5/18/2003     Quit date: 5/18/2013     Years since quitting: 10.7    Smokeless tobacco: Never    Tobacco comments:     former smoker (as per allscripts)   Vaping Use    Vaping status: Some Days   Substance Use Topics    Alcohol use: No    Drug use: Yes     Types: Marijuana     Comment: medical marijuana       Review of Systems   Constitutional:  Negative for diaphoresis, fatigue and fever.   HENT:  Negative for congestion, ear pain, nosebleeds and sore throat.    Eyes:  Negative for photophobia, pain, discharge and visual disturbance.   Respiratory:  Negative for cough, choking, chest tightness, shortness of breath and wheezing.    Cardiovascular:  Negative for chest pain and palpitations.   Gastrointestinal:  Positive for abdominal pain, nausea and vomiting. Negative for abdominal distention, anal bleeding, blood in stool, constipation, diarrhea and rectal pain.   Genitourinary:  Negative for dysuria, flank pain and frequency.   Musculoskeletal:  Negative for back pain, gait problem and joint swelling.   Skin:  Negative for color change and rash.   Neurological:  Negative for dizziness, syncope and headaches.   Psychiatric/Behavioral:  Negative for behavioral problems and confusion. The patient is not nervous/anxious.    All other systems reviewed and are negative.      Physical Exam  Physical Exam  Vitals and nursing note reviewed.   Constitutional:       General: She is not in acute distress.     Appearance: Normal appearance. She is not ill-appearing, toxic-appearing or diaphoretic.   HENT:      Head: Normocephalic and atraumatic.      Right Ear: Tympanic membrane, ear canal and external ear normal.      Left Ear: Tympanic membrane, ear canal and external ear normal.      Nose: Nose normal. No congestion or rhinorrhea.      Mouth/Throat:      Mouth: Mucous membranes are dry.      Pharynx: Oropharynx is clear. No oropharyngeal exudate or posterior oropharyngeal  erythema.   Eyes:      Extraocular Movements: Extraocular movements intact.      Conjunctiva/sclera: Conjunctivae normal.      Pupils: Pupils are equal, round, and reactive to light.   Cardiovascular:      Rate and Rhythm: Normal rate and regular rhythm.   Pulmonary:      Effort: Pulmonary effort is normal. No respiratory distress.      Breath sounds: Normal breath sounds.   Abdominal:      General: Bowel sounds are normal.      Palpations: Abdomen is soft.      Tenderness: There is abdominal tenderness in the right lower quadrant. There is guarding. There is no rebound. Negative signs include Momin's sign.   Musculoskeletal:         General: Normal range of motion.      Cervical back: Normal range of motion and neck supple. No rigidity or tenderness.      Right lower leg: No edema.      Left lower leg: No edema.   Lymphadenopathy:      Cervical: No cervical adenopathy.   Skin:     General: Skin is warm and dry.      Capillary Refill: Capillary refill takes less than 2 seconds.      Findings: No rash.   Neurological:      General: No focal deficit present.      Mental Status: She is alert and oriented to person, place, and time. Mental status is at baseline.   Psychiatric:         Mood and Affect: Mood normal.         Behavior: Behavior normal.         Vital Signs  ED Triage Vitals [01/28/24 2124]   Temperature Pulse Respirations Blood Pressure SpO2   98.9 °F (37.2 °C) 88 16 136/63 96 %      Temp Source Heart Rate Source Patient Position - Orthostatic VS BP Location FiO2 (%)   Oral Monitor Sitting Right arm --      Pain Score       8           Vitals:    01/28/24 2124 01/28/24 2230 01/29/24 0000   BP: 136/63 139/66 123/68   Pulse: 88 71 80   Patient Position - Orthostatic VS: Sitting           Visual Acuity      ED Medications  Medications   ondansetron (ZOFRAN) injection 4 mg (4 mg Intravenous Given 1/28/24 2158)   morphine injection 2 mg (2 mg Intravenous Given 1/28/24 2214)   iohexol (OMNIPAQUE) 350 MG/ML  injection (MULTI-DOSE) 100 mL (100 mL Intravenous Given 1/28/24 2351)   potassium chloride (Klor-Con M20) CR tablet 40 mEq (40 mEq Oral Given 1/29/24 0133)   ondansetron (ZOFRAN-ODT) dispersible tablet 4 mg (4 mg Oral Given 1/29/24 0123)   amoxicillin-clavulanate (AUGMENTIN) 875-125 mg per tablet 1 tablet (1 tablet Oral Given 1/29/24 0136)   oxyCODONE-acetaminophen (PERCOCET) 5-325 mg per tablet 1 tablet (1 tablet Oral Given 1/29/24 0134)       Diagnostic Studies  Results Reviewed       Procedure Component Value Units Date/Time    Urine Microscopic [[id number]]  (Normal) Collected: 01/28/24 2208    Lab Status: Final result Specimen: Urine, Clean Catch Updated: 01/28/24 2320     RBC, UA None Seen /hpf      WBC, UA None Seen /hpf      Epithelial Cells Occasional /hpf      Bacteria, UA None Seen /hpf     Comprehensive metabolic panel [[id number]]  (Abnormal) Collected: 01/28/24 2200    Lab Status: Final result Specimen: Blood from Arm, Left Updated: 01/28/24 2227     Sodium 137 mmol/L      Potassium 3.3 mmol/L      Chloride 103 mmol/L      CO2 27 mmol/L      ANION GAP 7 mmol/L      BUN 10 mg/dL      Creatinine 0.74 mg/dL      Glucose 123 mg/dL      Calcium 8.9 mg/dL      AST 22 U/L      ALT 27 U/L      Alkaline Phosphatase 49 U/L      Total Protein 6.8 g/dL      Albumin 4.0 g/dL      Total Bilirubin 0.34 mg/dL      eGFR 98 ml/min/1.73sq m     Narrative:      National Kidney Disease Foundation guidelines for Chronic Kidney Disease (CKD):     Stage 1 with normal or high GFR (GFR > 90 mL/min/1.73 square meters)    Stage 2 Mild CKD (GFR = 60-89 mL/min/1.73 square meters)    Stage 3A Moderate CKD (GFR = 45-59 mL/min/1.73 square meters)    Stage 3B Moderate CKD (GFR = 30-44 mL/min/1.73 square meters)    Stage 4 Severe CKD (GFR = 15-29 mL/min/1.73 square meters)    Stage 5 End Stage CKD (GFR <15 mL/min/1.73 square meters)  Note: GFR calculation is accurate only with a steady state creatinine    Lipase [[id number]]  (Abnormal)  Collected: 01/28/24 2200    Lab Status: Final result Specimen: Blood from Arm, Left Updated: 01/28/24 2227     Lipase 10 u/L     POCT pregnancy, urine [[id number]]  (Normal) Resulted: 01/28/24 2211    Lab Status: Final result Updated: 01/28/24 2211     EXT Preg Test, Ur Negative     Control Valid    Urine Macroscopic, POC [[id number]]  (Abnormal) Collected: 01/28/24 2208    Lab Status: Final result Specimen: Urine Updated: 01/28/24 2209     Color, UA Yellow     Clarity, UA Clear     pH, UA 6.5     Leukocytes, UA Negative     Nitrite, UA Negative     Protein, UA Negative mg/dl      Glucose, UA Negative mg/dl      Ketones, UA Negative mg/dl      Urobilinogen, UA 0.2 E.U./dl      Bilirubin, UA Negative     Occult Blood, UA Small     Specific [place], UA 1.010    Narrative:      CLINITEK RESULT    CBC and differential [[id number]]  (Abnormal) Collected: 01/28/24 2200    Lab Status: Final result Specimen: Blood from Arm, Left Updated: 01/28/24 2207     WBC 7.88 Thousand/uL      RBC 3.75 Million/uL      Hemoglobin 11.8 g/dL      Hematocrit 34.5 %      MCV 92 fL      MCH 31.5 pg      MCHC 34.2 g/dL      RDW 11.9 %      MPV 9.7 fL      Platelets 212 Thousands/uL      nRBC 0 /100 WBCs      Neutrophils Relative 65 %      Immat GRANS % 0 %      Lymphocytes Relative 25 %      Monocytes Relative 7 %      Eosinophils Relative 3 %      Basophils Relative 0 %      Neutrophils Absolute 5.09 Thousands/µL      Immature Grans Absolute 0.02 Thousand/uL      Lymphocytes Absolute 1.99 Thousands/µL      Monocytes Absolute 0.56 Thousand/µL      Eosinophils Absolute 0.20 Thousand/µL      Basophils Absolute 0.02 Thousands/µL                    CT abdomen pelvis with contrast   Final Result by Berlin Salazar MD (01/29 0101)      Findings above compatible with acute uncomplicated sigmoid diverticulitis.      Redemonstrated marked hepatomegaly and hepatic steatosis with numerous scattered low-density lesions likely cysts overall not significantly  changed compared to the previous examination dated 12/11/2022. Nonspecific subcentimeter enhancing focus in the    anterior right hepatic lobe in the region of previously suspected hemangioma although this lesion is not well seen on the current study.         Workstation performed: [id number]                    Procedures  Procedures         ED Course                               SBIRT 22yo+      Flowsheet Row Most Recent Value   Initial Alcohol Screen: US AUDIT-C     1. How often do you have a drink containing alcohol? 0 Filed at: 01/28/2024 2128   2. How many drinks containing alcohol do you have on a typical day you are drinking?  0 Filed at: 01/28/2024 2128   3a. Male UNDER 65: How often do you have five or more drinks on one occasion? 0 Filed at: 01/28/2024 2128   3b. FEMALE Any Age, or MALE 65+: How often do you have 4 or more drinks on one occassion? 0 Filed at: 01/28/2024 2128   Audit-C Score 0 Filed at: 01/28/2024 2128   MARLI: How many times in the past year have you...    Used an illegal drug or used a prescription medication for non-medical reasons? Never Filed at: 01/28/2024 2128                      Medical Decision Making  This is a 43-year-old female patient presents with a few days of lower abdominal pain diarrhea nausea with watery bowel movements and mucus.  No blood also loss of appetite.  States never had this before.  No fever chills headache blurred vision no vision [place] sore throat no chest pain or shortness of breath.  No vomiting no foul-smelling urine.  Differential diagnose includes not limited to appendicitis, diverticulitis, colitis    Problems Addressed:  Diarrhea: acute illness or injury     Details: Did not have any bowel movements in the emergency department.  Nausea: acute illness or injury     Details: Improved after Zofran  Sigmoid diverticulitis: acute illness or injury     Details: Discussed treatment options with patient either treat with oral antibiotics and discharged home  or admit patient.  Patient elected to go home states she felt significantly better with the Zofran and fluids and morphine.    Amount and/or Complexity of Data Reviewed  External Data Reviewed: notes.     Details: Reviewed previous notes again past medical history  Labs: ordered. Decision-making details documented in ED Course.     Details: All labs were interpreted by me no acute findings that require immediate intervention  Radiology: ordered.     Details: I personally reviewed the radiologist read of acute uncomplicated sigmoid diverticulitis  Discussion of management or test interpretation with external provider(s): Using joint decision-making with patient I did offer hospitalization which she declined.  We will try Augmentin twice daily and Zofran.  If any should worsen she has any questions comments or concerns to return immediately follow-up with her family doctor and amatory referral to gastroenterology was placed.  She verbalized understanding and agreement.    Risk  Prescription drug management.             Disposition  Final diagnoses:   Nausea   Diarrhea   Sigmoid diverticulitis     Time reflects when diagnosis was documented in both MDM as applicable and the Disposition within this note       Time User Action Codes Description Comment    1/29/2024  1:18 AM DiNapolRainer gasca Add [R11.0] Nausea     1/29/2024  1:18 AM DiNapolShamar gascak Add [R19.7] Diarrhea     1/29/2024  1:18 AM Rainer Browne Add [K57.32] Sigmoid diverticulitis           ED Disposition       ED Disposition   Discharge    Condition   Stable    Date/Time   Mon Jan 29, 2024 0118    Comment   Maura Wong discharge to home/self care.                   Follow-up Information       Follow up With Specialties Details Why Contact Info Additional Information    Julianne Lr MD Family Medicine Schedule an appointment as soon as possible for a visit   [address] [place] [address] [phone]       [facility] [place] [facility] Schedule an appointment as soon as possible for a visit   [address]  [facility] [address] [phone] [facility] [place], [facility] [address], [place], [place], [address] [phone]            Discharge Medication List as of 1/29/2024  1:21 AM        START taking these medications    Details   amoxicillin-clavulanate (AUGMENTIN) 875-125 mg per tablet Take 1 tablet by mouth every 12 (twelve) hours for 10 days, Starting Mon 1/29/2024, Until Thu 2/8/2024, Normal      ondansetron (ZOFRAN-ODT) 4 mg disintegrating tablet Take 1 tablet (4 mg total) by mouth every 6 (six) hours as needed for nausea, Starting Mon 1/29/2024, Normal           CONTINUE these medications which have NOT CHANGED    Details   albuterol (Ventolin HFA) 90 mcg/act inhaler Inhale 2 puffs every 6 (six) hours as needed for wheezing or shortness of breath, Starting Tue 11/30/2021, Normal      benzonatate (TESSALON) 200 MG capsule Take 1 capsule (200 mg total) by mouth 3 (three) times a day as needed for cough, Starting Thu 11/30/2023, Normal      cetirizine (ZyrTEC) 5 MG tablet Take 10 mg by mouth daily, Historical Med      escitalopram (LEXAPRO) 20 mg tablet Take 1 tablet (20 mg total) by mouth daily, Starting Thu 8/31/2023, Normal      hydrOXYzine HCL (ATARAX) 25 mg tablet Take 1 tablet (25 mg total) by mouth daily at bedtime as needed for anxiety, Starting Thu 8/31/2023, Normal      Loratadine (Claritin) 10 MG CAPS Historical Med      loratadine (CLARITIN) 10 mg tablet Take 10 mg by mouth daily, Historical Med      montelukast (SINGULAIR) 10 mg tablet Take 1 tablet (10 mg total) by mouth daily at bedtime, Starting Thu 8/31/2023, Normal      oxyCODONE (ROXICODONE) 5 immediate release tablet Historical Med      predniSONE 20 mg tablet 3 tablets once daily day 1 through 3 then 2 tablets daily day 4 through 6 then 1 tablet  daily day 7 through 9., Normal      senna (SENOKOT) 8.6 MG tablet Take 8.6 mg by mouth, Starting Wed 11/1/2023, Until Fri 12/1/2023 at 2359, Historical Med                 PDMP Review         Value Time User    PDMP Reviewed  Yes 5/8/2023  9:14 AM RODRICK Stone            ED Provider  Electronically Signed by             Rainer Adams PA-C  01/29/24 0214

## 2024-01-29 NOTE — ED NOTES
"Pt awake and alert, lying in right side lying position, accompanied by  in Room 14.  Pt states lower abdomen \"feels tender\" with exacerbation of pain with movement.  Pt states if she lies still, pain is relieved.      Alberta Boggs RN  01/29/24 0101    " none

## 2024-01-29 NOTE — ED NOTES
Pt to CT Scan via wheelchair     Alberta Boggs RN  01/28/24 [id number]       Alberta Boggs RN  01/28/24 8600

## 2024-01-29 NOTE — DISCHARGE INSTRUCTIONS
Follow-up with the gastroenterologist listed    Return with any worsening symptoms questions comments or concerns    Follow-up with your family doctor for ongoing care on re-evaluation

## 2024-02-02 ENCOUNTER — CONSULT (OUTPATIENT)
Dept: GASTROENTEROLOGY | Facility: MEDICAL CENTER | Age: 44
End: 2024-02-02
Payer: COMMERCIAL

## 2024-02-02 VITALS
HEIGHT: 66 IN | HEART RATE: 96 BPM | BODY MASS INDEX: 38.89 KG/M2 | TEMPERATURE: 97.6 F | DIASTOLIC BLOOD PRESSURE: 85 MMHG | SYSTOLIC BLOOD PRESSURE: 118 MMHG | OXYGEN SATURATION: 98 % | WEIGHT: 242 LBS

## 2024-02-02 DIAGNOSIS — K57.32 SIGMOID DIVERTICULITIS: ICD-10-CM

## 2024-02-02 DIAGNOSIS — K76.0 FATTY LIVER: Primary | ICD-10-CM

## 2024-02-02 DIAGNOSIS — R16.0 HEPATOMEGALY: ICD-10-CM

## 2024-02-02 PROCEDURE — 99214 OFFICE O/P EST MOD 30 MIN: CPT | Performed by: NURSE PRACTITIONER

## 2024-02-02 NOTE — PROGRESS NOTES
[facility] - Outpatient Follow-up Note  Maura Wong 43 y.o. female MRN: [id number]  Encounter: [id number]          ASSESSMENT AND PLAN:      1. Sigmoid diverticulitis    New diagnosis of acute sigmoid diverticulitis when she presented to the ER 1/28/2024.  She presented with left lower quadrant pain, loose stools and nausea.  CT abdomen pelvis confirmed acute uncomplicated diverticulitis.  Patient treated with Augmentin.  She is almost done with her antibiotic.  She is feeling better overall.  We did discuss diverticulitis/diverticulosis disease process, treatment and potential complications.  She did have a colonoscopy 8/23 which noted 2 polyps-1 tubular adenoma and 1 hyperplastic polyp.  She also did have sigmoid diverticulosis.  Denies any fevers or chills.  Appetite is good.    -Complete antibiotic treatment  -High-fiber diet once antibiotics are done    2.  Hepatomegaly  3.  Fatty liver  4.  Liver hemangioma    History of a liver hemangioma but not seen on recent CT.  Recent CT also noted marked hepatomegaly and hepatic steatosis with numerous scattered low-density lesions likely cysts overall not significantly changed compared to previous exam dated 12/22.  LFTs are normal.  We did have a long discussion about fatty liver and hepatomegaly.  She has no risk factors for viral hepatitis.  Hepatitis profile was negative.  Will obtain a full liver serology workup to rule out any other causes of hepatomegaly other than fatty liver.  Will also obtain an ultrasound elastography.    -LESLIE, AMA, anti-smooth muscle antibody, ceruloplasmin, alpha-1 antitrypsin, iron studies, celiac panel  -Ultrasound elastography  -Follow-up in office after test  -Low-fat/low-cholesterol diet, weight reduction, lifestyle modifications for fatty liver      ______________________________________________________________________    SUBJECTIVE: 43-year-old female here for follow-up.  He has a past medical history  of a liver hemangioma, asthma, OA, depression, binge eating disorder.    Presented to the ER to 1/28/2024 with 2 to 3 days of nausea without vomiting and lower abdominal pain on the right side.  This is also associated with loose stools with mucus but no blood.  No fever, chills or headache.  No aggravating or alleviating factors.  It does not worsen when she would drive in a car.  CT abdomen pelvis in the ER no Findings above compatible with acute uncomplicated sigmoid diverticulitis.  Redemonstrated marked hepatomegaly and hepatic steatosis with numerous scattered low-density lesions likely cysts overall not significantly changed compared to the previous examination dated 12/11/2022. Nonspecific subcentimeter enhancing focus in the  anterior right hepatic lobe in the region of previously suspected hemangioma although this lesion is not well seen on the current study.     Recent LFTs are normal.    Recent LFTs normal.    Prior EGD/colonoscopy     Colonoscopy 8/25/2023-2 subcentimeter polyps and diverticulosis.  Repeat colonoscopy recommended in 7 years.  Biopsies revealed 1 tubular adenoma and 1 hyperplastic polyp.    REVIEW OF SYSTEMS IS OTHERWISE NEGATIVE.  10 point review of systems negative other than per HPI      Historical Information   Past Medical History:   Diagnosis Date   • Allergic    • Allergic rhinitis    • Anxiety    • Arthritis 2016   • Asthma    • Complex ovarian cyst     last assessed: 7/9/2014   • Condyloma acuminatum    • Depression    • Dysmenorrhea     last assessed: 9/20/2013   • Eating disorder September 2020    in recovery BED   • H/O human papillomavirus infection    • Headache(784.0)    • History of ankle surgery    • Migraine    • Palpitations     last assessed: 12/20/2013   • TMJ dysfunction    • Varicella May 1985     Past Surgical History:   Procedure Laterality Date   • ANKLE FRACTURE SURGERY Left    • BREAST BIOPSY Left     mri guided  2022  benign   • CERVICAL BIOPSY  W/ LOOP  ELECTRODE EXCISION     •  SECTION      low transverse   • COSMETIC SURGERY      Breast reduction   • KNEE SURGERY     • MRI BREAST BIOPSY LEFT (ALL INCLUSIVE) Left 2022   • REDUCTION MAMMAPLASTY     • ROTATOR CUFF REPAIR  2014   • TUBAL LIGATION       Social History   Social History     Substance and Sexual Activity   Alcohol Use No     Social History     Substance and Sexual Activity   Drug Use Yes   • Types: Marijuana    Comment: medical marijuana     Social History     Tobacco Use   Smoking Status Former   • Current packs/day: 0.00   • Average packs/day: 0.5 packs/day for 10.0 years (5.0 ttl pk-yrs)   • Types: Cigarettes   • Start date: 2003   • Quit date: 2013   • Years since quitting: 10.7   Smokeless Tobacco Never   Tobacco Comments    former smoker (as per allscripts)     Family History   Problem Relation Age of Onset   • Breast cancer Mother 43        diagnosed at 43   • Cancer Mother         Breast Cancer   • Cancer Father         Amyloidosis   • Asthma Father    • Breast cancer Paternal Grandmother 42        diagnosed, death at 48   • Cancer Paternal Grandmother         Breast cancer   • Breast cancer Paternal Aunt 52   • Cancer Maternal Grandfather         ureter/Kidney   • No Known Problems Daughter    • No Known Problems Paternal Aunt        Meds/Allergies       Current Outpatient Medications:   •  albuterol (Ventolin HFA) 90 mcg/act inhaler  •  amoxicillin-clavulanate (AUGMENTIN) 875-125 mg per tablet  •  escitalopram (LEXAPRO) 20 mg tablet  •  hydrOXYzine HCL (ATARAX) 25 mg tablet  •  loratadine (CLARITIN) 10 mg tablet  •  montelukast (SINGULAIR) 10 mg tablet  •  ondansetron (ZOFRAN-ODT) 4 mg disintegrating tablet  •  benzonatate (TESSALON) 200 MG capsule  •  cetirizine (ZyrTEC) 5 MG tablet  •  Loratadine (Claritin) 10 MG CAPS  •  oxyCODONE (ROXICODONE) 5 immediate release tablet  •  predniSONE 20 mg tablet  •  senna (SENOKOT) 8.6 MG tablet    Allergies   Allergen  "Reactions   • Pollen Extract Allergic Rhinitis   • Codeine      Other reaction(s): Unknown Reaction   • Morphine      Other reaction(s): Other (See Comments)   • Demerol [Meperidine] Rash   • Other      Annotation - [id number]: mushroom           Objective     Blood pressure 118/85, pulse 96, temperature 97.6 °F (36.4 °C), height 5' 6\" (1.676 m), weight 110 kg (242 lb), last menstrual period 01/23/2024, SpO2 98%, not currently breastfeeding. Body mass index is 39.06 kg/m².      PHYSICAL EXAM:      General Appearance:   Alert, cooperative, no distress   HEENT:   Normocephalic, atraumatic, anicteric.     Neck:  Supple, symmetrical, trachea midline   Lungs:   Clear to auscultation bilaterally; no rales, rhonchi or wheezing; respirations unlabored    Heart::   Regular rate and rhythm; no murmur, rub, or gallop.   Abdomen:   Soft, non-tender, non-distended; normal bowel sounds; no masses, no organomegaly    Genitalia:   Deferred    Rectal:   Deferred    Extremities:  No cyanosis, clubbing or edema    Pulses:  2+ and symmetric    Skin:  No jaundice, rashes, or lesions    Lymph nodes:  No palpable cervical lymphadenopathy        Lab Results:   No visits with results within 1 Day(s) from this visit.   Latest known visit with results is:   Admission on 01/28/2024, Discharged on 01/29/2024   Component Date Value   • WBC 01/28/2024 7.88    • RBC 01/28/2024 3.75 (L)    • Hemoglobin 01/28/2024 11.8    • Hematocrit 01/28/2024 34.5 (L)    • MCV 01/28/2024 92    • MCH 01/28/2024 31.5    • MCHC 01/28/2024 34.2    • RDW 01/28/2024 11.9    • MPV 01/28/2024 9.7    • Platelets 01/28/2024 212    • nRBC 01/28/2024 0    • Neutrophils Relative 01/28/2024 65    • Immat GRANS % 01/28/2024 0    • Lymphocytes Relative 01/28/2024 25    • Monocytes Relative 01/28/2024 7    • Eosinophils Relative 01/28/2024 3    • Basophils Relative 01/28/2024 0    • Neutrophils Absolute 01/28/2024 5.09    • Immature Grans Absolute 01/28/2024 0.02    • Lymphocytes " Absolute 01/28/2024 1.99    • Monocytes Absolute 01/28/2024 0.56    • Eosinophils Absolute 01/28/2024 0.20    • Basophils Absolute 01/28/2024 0.02    • Sodium 01/28/2024 137    • Potassium 01/28/2024 3.3 (L)    • Chloride 01/28/2024 103    • CO2 01/28/2024 27    • ANION GAP 01/28/2024 7    • BUN 01/28/2024 10    • Creatinine 01/28/2024 0.74    • Glucose 01/28/2024 123    • Calcium 01/28/2024 8.9    • AST 01/28/2024 22    • ALT 01/28/2024 27    • Alkaline Phosphatase 01/28/2024 49    • Total Protein 01/28/2024 6.8    • Albumin 01/28/2024 4.0    • Total Bilirubin 01/28/2024 0.34    • eGFR 01/28/2024 98    • Lipase 01/28/2024 10 (L)    • EXT Preg Test, Ur 01/28/2024 Negative    • Control 01/28/2024 Valid    • Color, UA 01/28/2024 Yellow    • Clarity, UA 01/28/2024 Clear    • pH, UA 01/28/2024 6.5    • Leukocytes, UA 01/28/2024 Negative    • Nitrite, UA 01/28/2024 Negative    • Protein, UA 01/28/2024 Negative    • Glucose, UA 01/28/2024 Negative    • Ketones, UA 01/28/2024 Negative    • Urobilinogen, UA 01/28/2024 0.2    • Bilirubin, UA 01/28/2024 Negative    • Occult Blood, UA 01/28/2024 Small (A)    • Specific [place], UA 01/28/2024 1.010    • RBC, UA 01/28/2024 None Seen    • WBC, UA 01/28/2024 None Seen    • Epithelial Cells 01/28/2024 Occasional    • Bacteria, UA 01/28/2024 None Seen          Radiology Results:   CT abdomen pelvis with contrast    Result Date: 1/29/2024  Narrative: CT ABDOMEN AND PELVIS WITH IV CONTRAST INDICATION: RLQ abdominal pain (Age >= 14y) RLQ pain r/o appy. COMPARISON: 12/1/2022. TECHNIQUE: CT examination of the abdomen and pelvis was performed. Multiplanar 2D reformatted images were created from the source data. This examination, like all CT scans performed in the [facility], was performed utilizing techniques to minimize radiation dose exposure, including the use of iterative reconstruction and automated exposure control. Radiation dose length product (DLP) for this  visit: 1206.75 mGy-cm IV Contrast: 100 mL of iohexol (OMNIPAQUE) Enteric Contrast: Not administered. FINDINGS: ABDOMEN LOWER CHEST: No clinically significant abnormality in the visualized lower chest. LIVER/BILIARY TREE: Redemonstrated hepatomegaly and fatty infiltrative changes with scattered low-density lesions likely reflecting cysts overall similar in appearance to previous study. Nonspecific subcentimeter enhancing focus in the anterior right hepatic lobe (2:26/27) in the region of previously seen probable hemangioma although this lesion is not well visualized on current examination. Normal hepatic contours. No biliary ductal dilatation. GALLBLADDER: No calcified gallstones. No pericholecystic inflammatory change. SPLEEN: Unremarkable. PANCREAS: Unremarkable. ADRENAL GLANDS: Unremarkable. KIDNEYS/URETERS: Unremarkable. No hydronephrosis. STOMACH AND BOWEL: No bowel obstruction. Colonic diverticulosis with focal wall thickening involving the mid distal segment and with few acutely inflamed diverticula in this region. No extraluminal air or pericolonic collection. APPENDIX: Normal appendix. ABDOMINOPELVIC CAVITY: No ascites. No pneumoperitoneum. No lymphadenopathy. VESSELS: Unremarkable for patient's age. PELVIS REPRODUCTIVE ORGANS: Unremarkable for patient's age. URINARY BLADDER: Unremarkable. ABDOMINAL WALL/INGUINAL REGIONS: Unremarkable. BONES: No acute fracture or suspicious osseous lesion.     Impression: Findings above compatible with acute uncomplicated sigmoid diverticulitis. Redemonstrated marked hepatomegaly and hepatic steatosis with numerous scattered low-density lesions likely cysts overall not significantly changed compared to the previous examination dated 12/11/2022. Nonspecific subcentimeter enhancing focus in the anterior right hepatic lobe in the region of previously suspected hemangioma although this lesion is not well seen on the current study. Workstation performed: [id number]

## 2024-02-03 ENCOUNTER — LAB (OUTPATIENT)
Dept: LAB | Facility: CLINIC | Age: 44
End: 2024-02-03
Payer: COMMERCIAL

## 2024-02-03 DIAGNOSIS — R73.01 ELEVATED FASTING GLUCOSE: ICD-10-CM

## 2024-02-03 DIAGNOSIS — Z11.59 ENCOUNTER FOR HEPATITIS C SCREENING TEST FOR LOW RISK PATIENT: ICD-10-CM

## 2024-02-03 DIAGNOSIS — R76.8 POSITIVE ANA (ANTINUCLEAR ANTIBODY): Primary | ICD-10-CM

## 2024-02-03 DIAGNOSIS — D64.9 ANEMIA, UNSPECIFIED TYPE: ICD-10-CM

## 2024-02-03 DIAGNOSIS — Z00.00 ANNUAL PHYSICAL EXAM: ICD-10-CM

## 2024-02-03 DIAGNOSIS — R16.0 HEPATOMEGALY: ICD-10-CM

## 2024-02-03 DIAGNOSIS — E78.5 DYSLIPIDEMIA: ICD-10-CM

## 2024-02-03 DIAGNOSIS — K76.0 FATTY LIVER: ICD-10-CM

## 2024-02-03 LAB
ALBUMIN SERPL BCP-MCNC: 4.5 G/DL (ref 3.5–5)
ALP SERPL-CCNC: 54 U/L (ref 34–104)
ALT SERPL W P-5'-P-CCNC: 57 U/L (ref 7–52)
ANA SER QL IA: POSITIVE
ANION GAP SERPL CALCULATED.3IONS-SCNC: 6 MMOL/L
AST SERPL W P-5'-P-CCNC: 62 U/L (ref 13–39)
BASOPHILS # BLD AUTO: 0.03 THOUSANDS/ÂΜL (ref 0–0.1)
BASOPHILS NFR BLD AUTO: 0 % (ref 0–1)
BILIRUB SERPL-MCNC: 0.43 MG/DL (ref 0.2–1)
BUN SERPL-MCNC: 10 MG/DL (ref 5–25)
CALCIUM SERPL-MCNC: 9.9 MG/DL (ref 8.4–10.2)
CHLORIDE SERPL-SCNC: 104 MMOL/L (ref 96–108)
CHOLEST SERPL-MCNC: 174 MG/DL
CO2 SERPL-SCNC: 29 MMOL/L (ref 21–32)
CREAT SERPL-MCNC: 0.81 MG/DL (ref 0.6–1.3)
EOSINOPHIL # BLD AUTO: 0.19 THOUSAND/ÂΜL (ref 0–0.61)
EOSINOPHIL NFR BLD AUTO: 3 % (ref 0–6)
ERYTHROCYTE [DISTWIDTH] IN BLOOD BY AUTOMATED COUNT: 11.9 % (ref 11.6–15.1)
FERRITIN SERPL-MCNC: 90 NG/ML (ref 11–307)
FOLATE SERPL-MCNC: 15 NG/ML
GFR SERPL CREATININE-BSD FRML MDRD: 87 ML/MIN/1.73SQ M
GLUCOSE P FAST SERPL-MCNC: 144 MG/DL (ref 65–99)
HCT VFR BLD AUTO: 40.9 % (ref 34.8–46.1)
HCV AB SER QL: NORMAL
HDLC SERPL-MCNC: 35 MG/DL
HGB BLD-MCNC: 13.4 G/DL (ref 11.5–15.4)
IGA SERPL-MCNC: 211 MG/DL (ref 66–433)
IMM GRANULOCYTES # BLD AUTO: 0.03 THOUSAND/UL (ref 0–0.2)
IMM GRANULOCYTES NFR BLD AUTO: 0 % (ref 0–2)
IRON SATN MFR SERPL: 19 % (ref 15–50)
IRON SERPL-MCNC: 69 UG/DL (ref 50–212)
LDLC SERPL CALC-MCNC: 103 MG/DL (ref 0–100)
LYMPHOCYTES # BLD AUTO: 1.76 THOUSANDS/ÂΜL (ref 0.6–4.47)
LYMPHOCYTES NFR BLD AUTO: 25 % (ref 14–44)
MCH RBC QN AUTO: 30.9 PG (ref 26.8–34.3)
MCHC RBC AUTO-ENTMCNC: 32.8 G/DL (ref 31.4–37.4)
MCV RBC AUTO: 95 FL (ref 82–98)
MONOCYTES # BLD AUTO: 0.52 THOUSAND/ÂΜL (ref 0.17–1.22)
MONOCYTES NFR BLD AUTO: 7 % (ref 4–12)
NEUTROPHILS # BLD AUTO: 4.46 THOUSANDS/ÂΜL (ref 1.85–7.62)
NEUTS SEG NFR BLD AUTO: 65 % (ref 43–75)
NRBC BLD AUTO-RTO: 0 /100 WBCS
PLATELET # BLD AUTO: 254 THOUSANDS/UL (ref 149–390)
PMV BLD AUTO: 9.9 FL (ref 8.9–12.7)
POTASSIUM SERPL-SCNC: 3.6 MMOL/L (ref 3.5–5.3)
PROT SERPL-MCNC: 7.3 G/DL (ref 6.4–8.4)
RBC # BLD AUTO: 4.33 MILLION/UL (ref 3.81–5.12)
SODIUM SERPL-SCNC: 139 MMOL/L (ref 135–147)
TIBC SERPL-MCNC: 367 UG/DL (ref 250–450)
TRIGL SERPL-MCNC: 179 MG/DL
UIBC SERPL-MCNC: 298 UG/DL (ref 155–355)
VIT B12 SERPL-MCNC: 275 PG/ML (ref 180–914)
WBC # BLD AUTO: 6.99 THOUSAND/UL (ref 4.31–10.16)

## 2024-02-03 PROCEDURE — 80061 LIPID PANEL: CPT

## 2024-02-03 PROCEDURE — 83036 HEMOGLOBIN GLYCOSYLATED A1C: CPT

## 2024-02-03 PROCEDURE — 86225 DNA ANTIBODY NATIVE: CPT

## 2024-02-03 PROCEDURE — 80053 COMPREHEN METABOLIC PANEL: CPT

## 2024-02-03 PROCEDURE — 83540 ASSAY OF IRON: CPT

## 2024-02-03 PROCEDURE — 82103 ALPHA-1-ANTITRYPSIN TOTAL: CPT

## 2024-02-03 PROCEDURE — 86381 MITOCHONDRIAL ANTIBODY EACH: CPT

## 2024-02-03 PROCEDURE — 86364 TISS TRNSGLTMNASE EA IG CLAS: CPT

## 2024-02-03 PROCEDURE — 86235 NUCLEAR ANTIGEN ANTIBODY: CPT

## 2024-02-03 PROCEDURE — 82728 ASSAY OF FERRITIN: CPT

## 2024-02-03 PROCEDURE — 82746 ASSAY OF FOLIC ACID SERUM: CPT

## 2024-02-03 PROCEDURE — 82390 ASSAY OF CERULOPLASMIN: CPT

## 2024-02-03 PROCEDURE — 86039 ANTINUCLEAR ANTIBODIES (ANA): CPT

## 2024-02-03 PROCEDURE — 86015 ACTIN ANTIBODY EACH: CPT

## 2024-02-03 PROCEDURE — 82607 VITAMIN B-12: CPT

## 2024-02-03 PROCEDURE — 83550 IRON BINDING TEST: CPT

## 2024-02-03 PROCEDURE — 36415 COLL VENOUS BLD VENIPUNCTURE: CPT

## 2024-02-03 PROCEDURE — 86038 ANTINUCLEAR ANTIBODIES: CPT

## 2024-02-03 PROCEDURE — 86803 HEPATITIS C AB TEST: CPT

## 2024-02-03 PROCEDURE — 82784 ASSAY IGA/IGD/IGG/IGM EACH: CPT

## 2024-02-03 PROCEDURE — 85025 COMPLETE CBC W/AUTO DIFF WBC: CPT

## 2024-02-04 LAB
A1AT SERPL-MCNC: 138 MG/DL (ref 101–187)
CERULOPLASMIN SERPL-MCNC: 30.3 MG/DL (ref 19–39)

## 2024-02-05 ENCOUNTER — OFFICE VISIT (OUTPATIENT)
Dept: FAMILY MEDICINE CLINIC | Facility: CLINIC | Age: 44
End: 2024-02-05
Payer: COMMERCIAL

## 2024-02-05 VITALS
HEIGHT: 66 IN | SYSTOLIC BLOOD PRESSURE: 126 MMHG | BODY MASS INDEX: 39.06 KG/M2 | HEART RATE: 88 BPM | TEMPERATURE: 97.6 F | DIASTOLIC BLOOD PRESSURE: 78 MMHG | OXYGEN SATURATION: 97 %

## 2024-02-05 DIAGNOSIS — F33.1 MAJOR DEPRESSIVE DISORDER, RECURRENT, MODERATE (HCC): ICD-10-CM

## 2024-02-05 DIAGNOSIS — R73.01 ELEVATED FASTING GLUCOSE: ICD-10-CM

## 2024-02-05 DIAGNOSIS — K57.92 DIVERTICULITIS: Primary | ICD-10-CM

## 2024-02-05 DIAGNOSIS — F41.9 ANXIETY: ICD-10-CM

## 2024-02-05 DIAGNOSIS — K76.0 FATTY LIVER: ICD-10-CM

## 2024-02-05 PROBLEM — F32.A DEPRESSION: Status: RESOLVED | Noted: 2021-11-30 | Resolved: 2024-02-05

## 2024-02-05 LAB
ACTIN IGG SERPL-ACNC: 6 UNITS (ref 0–19)
ANA HOMOGEN SER QL IF: NORMAL
ANA HOMOGEN TITR SER: NORMAL {TITER}
CHROMATIN AB SERPL-ACNC: NEGATIVE
DSDNA AB SER-ACNC: <4 IU/ML
ENA SS-A AB SER IA-ACNC: NEGATIVE
ENA SS-B AB SER IA-ACNC: NEGATIVE
EST. AVERAGE GLUCOSE BLD GHB EST-MCNC: 148 MG/DL
HBA1C MFR BLD: 6.8 %
MITOCHONDRIA M2 IGG SER-ACNC: <20 UNITS (ref 0–20)
TTG IGA SER-ACNC: <2 U/ML (ref 0–3)

## 2024-02-05 PROCEDURE — 99214 OFFICE O/P EST MOD 30 MIN: CPT | Performed by: FAMILY MEDICINE

## 2024-02-05 NOTE — PROGRESS NOTES
Assessment/Plan:    No problem-specific Assessment & Plan notes found for this encounter.       Diagnoses and all orders for this visit:    Diverticulitis    Fatty liver    Elevated fasting glucose  -     Hemoglobin A1C; Future    Major depressive disorder, recurrent, moderate (HCC)    Anxiety        - ED notes and CT reviewed; continue 10 day course of Augmentin for diverticulitis.   - Notes from GI reviewed regarding fatty liver, lab testing still in process although LESLIE screening was abnormal, awaiting reflex pattern and titer.  US with elastography scheduled 2/11.  - CMP did show elevated fasting glucose of 145, will check A1C and ask clinical staff to see if it can be added on to current lab work  - Continue Lexapro 20mg daily and hydroxyzine PRN    Subjective:      Patient ID: Maura Wong is a 43 y.o. female.    HPI  Maura Wong is a very pleasant 43 year old female who presents today for a follow up after being seen in the Emergency department 1/28 where she was diagnosed with diverticulitis as well as fatty liver. She was started on a 10 day course of Augmentin which is she tolerating well without any difficulty. She notes that she is improving gradually and is slowly getting her appetite back. She did see Gastroenterology who ordered lab work to rule out etiology of fatty liver and is scheduled to have an ultrasound with elastography this week. She did undergo decompression of peroneal nerve and decompression and neurolysis of sciatic nerve in November and is slowly improving and regaining feeling in her left leg although exercise is still challenging. She does report that her anxiety is stable at this time on current regimen of lexapro 20mg and hydroxyzine as needed. She notes that her previous therapist left after seeing her for 3 years and she is currently under the care of another therapist but is looking for another one.       The following portions of the patient's history were reviewed and  "updated as appropriate: allergies, current medications, past family history, past medical history, past social history, past surgical history, and problem list.    Review of Systems   Constitutional: Negative.    HENT: Negative.     Eyes: Negative.    Respiratory: Negative.     Cardiovascular: Negative.    Gastrointestinal:  Positive for abdominal pain.   Genitourinary: Negative.    Musculoskeletal: Negative.    Skin: Negative.    Neurological: Negative.    Psychiatric/Behavioral: Negative.           Objective:      /78 (BP Location: Left arm, Patient Position: Sitting, Cuff Size: Large)   Pulse 88   Temp 97.6 °F (36.4 °C) (Temporal)   Ht 5' 6\" (1.676 m)   LMP 01/23/2024 (Exact Date)   SpO2 97%   BMI 39.06 kg/m²          Physical Exam  Constitutional:       General: She is not in acute distress.     Appearance: She is not ill-appearing.   HENT:      Head: Normocephalic and atraumatic.   Eyes:      General:         Right eye: No discharge.         Left eye: No discharge.      Extraocular Movements: Extraocular movements intact.   Cardiovascular:      Rate and Rhythm: Normal rate.   Pulmonary:      Effort: Pulmonary effort is normal. No respiratory distress.      Breath sounds: No wheezing.   Abdominal:      General: Bowel sounds are normal. There is no distension.      Palpations: Abdomen is soft.      Tenderness: There is no abdominal tenderness. There is no guarding.   Neurological:      General: No focal deficit present.      Mental Status: She is alert.   Psychiatric:         Mood and Affect: Mood normal.         Behavior: Behavior normal.           "

## 2024-02-07 ENCOUNTER — APPOINTMENT (OUTPATIENT)
Dept: LAB | Facility: MEDICAL CENTER | Age: 44
End: 2024-02-07
Payer: COMMERCIAL

## 2024-02-07 DIAGNOSIS — E11.9 TYPE 2 DIABETES MELLITUS WITHOUT COMPLICATION, WITHOUT LONG-TERM CURRENT USE OF INSULIN (HCC): Primary | ICD-10-CM

## 2024-02-07 DIAGNOSIS — R76.8 POSITIVE ANA (ANTINUCLEAR ANTIBODY): ICD-10-CM

## 2024-02-07 LAB
IGG SERPL-MCNC: 894 MG/DL (ref 635–1741)
IGM SERPL-MCNC: 125 MG/DL (ref 45–281)

## 2024-02-07 PROCEDURE — 86376 MICROSOMAL ANTIBODY EACH: CPT

## 2024-02-07 PROCEDURE — 82784 ASSAY IGA/IGD/IGG/IGM EACH: CPT

## 2024-02-07 PROCEDURE — 36415 COLL VENOUS BLD VENIPUNCTURE: CPT

## 2024-02-08 LAB — LKM-1 AB SER-ACNC: <20.1 UNITS (ref 0–20)

## 2024-02-11 ENCOUNTER — HOSPITAL ENCOUNTER (OUTPATIENT)
Dept: ULTRASOUND IMAGING | Facility: HOSPITAL | Age: 44
Discharge: HOME | End: 2024-02-11
Payer: COMMERCIAL

## 2024-02-11 DIAGNOSIS — K76.0 FATTY LIVER: ICD-10-CM

## 2024-02-11 PROCEDURE — 76981 USE PARENCHYMA: CPT

## 2024-02-12 ENCOUNTER — PATIENT MESSAGE (OUTPATIENT)
Dept: OBGYN CLINIC | Facility: CLINIC | Age: 44
End: 2024-02-12

## 2024-02-12 DIAGNOSIS — Z91.89 AT HIGH RISK FOR BREAST CANCER: Primary | ICD-10-CM

## 2024-02-12 DIAGNOSIS — F33.1 MODERATE EPISODE OF RECURRENT MAJOR DEPRESSIVE DISORDER (HCC): ICD-10-CM

## 2024-02-12 DIAGNOSIS — E11.9 TYPE 2 DIABETES MELLITUS WITHOUT COMPLICATION, WITHOUT LONG-TERM CURRENT USE OF INSULIN (HCC): Primary | ICD-10-CM

## 2024-02-13 NOTE — PATIENT COMMUNICATION
Will you please call Maura. Last mammogram showed elevated lifetime risk for breast cancer but does not have very dense tissue. At annual exam we discussed f/u with and was given referral to breast specialist to determine need for additional screening. Would recommend f/u with their office. Please provide number for this if needed.

## 2024-02-16 ENCOUNTER — TELEPHONE (OUTPATIENT)
Dept: HEMATOLOGY ONCOLOGY | Facility: CLINIC | Age: 44
End: 2024-02-16

## 2024-02-16 NOTE — TELEPHONE ENCOUNTER
I called Maura in response to a referral that was received for patient to establish care with Surgical Oncology.     Outreach was made to schedule a consultation.    A consultation was scheduled for patient during this call. Patient is scheduled on 4/8/24 at 9:30am with [facility] at the [facility]

## 2024-02-20 ENCOUNTER — TELEPHONE (OUTPATIENT)
Dept: OBGYN CLINIC | Facility: CLINIC | Age: 44
End: 2024-02-20

## 2024-02-20 ENCOUNTER — NURSE TRIAGE (OUTPATIENT)
Age: 44
End: 2024-02-20

## 2024-02-20 NOTE — TELEPHONE ENCOUNTER
----- Message from Maura Wong sent at 2/19/2024  6:22 PM EST -----  Regarding: Liver scan result question   Contact: [phone]  Elizabeth,  I read your note about the scan results.     Can you advise on a diet that’s recommended?     I’m really struggling between the guidelines for this, the diverticulitis, and the recently diagnosed diabetes.     I feel afraid to eat or drink anything at all.     Am I allowed to have an occasional glass of wine? I might have three or four glasses a year at special events. Will this be a problem?

## 2024-02-20 NOTE — TELEPHONE ENCOUNTER
----- Message from Maura Wong sent at 2/19/2024  6:22 PM EST -----  Regarding: Liver scan result question   Contact: [phone]  Elizabeth,  I read your note about the scan results.     Can you advise on a diet that’s recommended?     I’m really struggling between the guidelines for this, the diverticulitis, and the recently diagnosed diabetes.     I feel afraid to eat or drink anything at all.     Am I allowed to have an occasional glass of wine? I might have three or four glasses a year at special events. Will this be a problem?

## 2024-02-20 NOTE — TELEPHONE ENCOUNTER
"Patient called in after test COVID + yesterday, wants to know what OTC medications she can have with DMII and Liver disease per patient. Patients worse symptoms are cough,fevers,and nasal congestion. Patient unable to take tylenol and sudafed. Offered office visit, patient would like to do home care for now. Please advise.      Reason for Disposition   [1] COVID-19 diagnosed by positive lab test (e.g., PCR, rapid self-test kit) AND [2] mild symptoms (e.g., cough, fever, others) AND [3] no complications or SOB    Answer Assessment - Initial Assessment Questions  1. COVID-19 DIAGNOSIS: \"Who made your COVID-19 diagnosis?\" \"Was it confirmed by a positive lab test or self-test?\" If not diagnosed by a doctor (or NP/PA), ask \"Are there lots of cases (community spread) where you live?\" Note: See public health department website, if unsure.      Home test yesterday COVID +  2. COVID-19 EXPOSURE: \"Was there any known exposure to COVID before the symptoms began?\" CDC Definition of close contact: within 6 feet (2 meters) for a total of 15 minutes or more over a 24-hour period.        is negative, unsure  3. ONSET: \"When did the COVID-19 symptoms start?\"       yesterday  4. WORST SYMPTOM: \"What is your worst symptom?\" (e.g., cough, fever, shortness of breath, muscle aches)      Nasal congestion, fever, cough  5. COUGH: \"Do you have a cough?\" If Yes, ask: \"How bad is the cough?\"        Yes, NP  6. FEVER: \"Do you have a fever?\" If Yes, ask: \"What is your temperature, how was it measured, and when did it start?\"      yes  7. RESPIRATORY STATUS: \"Describe your breathing?\" (e.g., shortness of breath, wheezing, unable to speak)       Denies issue  8. BETTER-SAME-WORSE: \"Are you getting better, staying the same or getting worse compared to yesterday?\"  If getting worse, ask, \"In what way?\"      same  9. HIGH RISK DISEASE: \"Do you have any chronic medical problems?\" (e.g., asthma, heart or lung disease, weak immune system, " obesity, etc.)      Yes, diabetes    Protocols used: Coronavirus (COVID-19) Diagnosed or Suspected-ADULT-[place]

## 2024-02-21 NOTE — TELEPHONE ENCOUNTER
I don't think she actually meant to send question to GYN. We did not order the testing and she is already seeing GI. Would defer question to them.

## 2024-03-05 ENCOUNTER — RA CDI HCC (OUTPATIENT)
Dept: OTHER | Facility: HOSPITAL | Age: 44
End: 2024-03-05

## 2024-03-12 ENCOUNTER — OFFICE VISIT (OUTPATIENT)
Dept: FAMILY MEDICINE CLINIC | Facility: CLINIC | Age: 44
End: 2024-03-12
Payer: COMMERCIAL

## 2024-03-12 VITALS
DIASTOLIC BLOOD PRESSURE: 72 MMHG | HEART RATE: 81 BPM | SYSTOLIC BLOOD PRESSURE: 118 MMHG | HEIGHT: 66 IN | TEMPERATURE: 97.7 F | OXYGEN SATURATION: 98 % | BODY MASS INDEX: 39.06 KG/M2

## 2024-03-12 DIAGNOSIS — E11.9 TYPE 2 DIABETES MELLITUS WITHOUT COMPLICATION, WITHOUT LONG-TERM CURRENT USE OF INSULIN (HCC): Primary | ICD-10-CM

## 2024-03-12 PROCEDURE — 99213 OFFICE O/P EST LOW 20 MIN: CPT | Performed by: FAMILY MEDICINE

## 2024-03-12 NOTE — PROGRESS NOTES
"Assessment/Plan:    No problem-specific Assessment & Plan notes found for this encounter.       Diagnoses and all orders for this visit:    Type 2 diabetes mellitus without complication, without long-term current use of insulin ([facility])  Comments:  Continue metformin 500mg daily with breakfast in conjunction with diet and lifestyle modifications; follow up in 2 months          Subjective:      Patient ID: Maura Wong is a 43 y.o. female.    HPI  Maura Wong is a very pleasant 43 year old female who presents today for a follow up after being diagnosed with type 2 diabetes mellitus. Patient was started on metformin and has been tolerating the medication relatively well. She does have a history of an eating disorder and notes that that this new diagnosis of diabetes and fibrosis has been triggering for her as she struggles to know the things she can and cannot eat. She is interested in seeing a nutritionist and she has one in mind that she will contact.     The following portions of the patient's history were reviewed and updated as appropriate: allergies, current medications, past family history, past medical history, past social history, past surgical history, and problem list.    Review of Systems   Constitutional: Negative.    HENT: Negative.     Eyes: Negative.    Respiratory: Negative.     Cardiovascular: Negative.    Gastrointestinal: Negative.  Negative for abdominal pain, constipation, diarrhea, nausea and vomiting.   Genitourinary: Negative.    Musculoskeletal: Negative.    Skin: Negative.    Neurological: Negative.    Psychiatric/Behavioral: Negative.           Objective:      /72 (BP Location: Left arm, Patient Position: Sitting, Cuff Size: Standard)   Pulse 81   Temp 97.7 °F (36.5 °C) (Temporal)   Ht 5' 6\" (1.676 m)   SpO2 98%   BMI 39.06 kg/m²          Physical Exam  Constitutional:       General: She is not in acute distress.     Appearance: She is not ill-appearing.   HENT:      Head: " Normocephalic and atraumatic.   Eyes:      General:         Right eye: No discharge.         Left eye: No discharge.      Extraocular Movements: Extraocular movements intact.   Cardiovascular:      Rate and Rhythm: Normal rate.   Pulmonary:      Effort: No respiratory distress.   Neurological:      General: No focal deficit present.      Mental Status: She is alert.   Psychiatric:         Mood and Affect: Mood normal.         Behavior: Behavior normal.

## 2024-04-02 ENCOUNTER — HOSPITAL ENCOUNTER (OUTPATIENT)
Dept: ULTRASOUND IMAGING | Facility: MEDICAL CENTER | Age: 44
Discharge: HOME | End: 2024-04-02
Payer: COMMERCIAL

## 2024-04-02 DIAGNOSIS — E04.9 ENLARGED THYROID GLAND: ICD-10-CM

## 2024-04-02 DIAGNOSIS — E03.9 HYPOTHYROIDISM, UNSPECIFIED TYPE: ICD-10-CM

## 2024-04-02 PROCEDURE — 76536 US EXAM OF HEAD AND NECK: CPT

## 2024-04-03 DIAGNOSIS — Z00.6 ENCOUNTER FOR EXAMINATION FOR NORMAL COMPARISON OR CONTROL IN CLINICAL RESEARCH PROGRAM: ICD-10-CM

## 2024-04-08 ENCOUNTER — TELEPHONE (OUTPATIENT)
Dept: SURGICAL ONCOLOGY | Facility: CLINIC | Age: 44
End: 2024-04-08

## 2024-04-08 ENCOUNTER — CONSULT (OUTPATIENT)
Dept: SURGICAL ONCOLOGY | Facility: CLINIC | Age: 44
End: 2024-04-08
Payer: COMMERCIAL

## 2024-04-08 VITALS
TEMPERATURE: 97.6 F | HEART RATE: 56 BPM | BODY MASS INDEX: 39.06 KG/M2 | SYSTOLIC BLOOD PRESSURE: 114 MMHG | OXYGEN SATURATION: 97 % | HEIGHT: 66 IN | DIASTOLIC BLOOD PRESSURE: 78 MMHG | RESPIRATION RATE: 16 BRPM

## 2024-04-08 DIAGNOSIS — Z80.3 FAMILY HISTORY OF BREAST CANCER: ICD-10-CM

## 2024-04-08 DIAGNOSIS — Z91.89 INCREASED RISK OF BREAST CANCER: Primary | ICD-10-CM

## 2024-04-08 DIAGNOSIS — Z91.89 AT HIGH RISK FOR BREAST CANCER: ICD-10-CM

## 2024-04-08 PROCEDURE — 99204 OFFICE O/P NEW MOD 45 MIN: CPT | Performed by: NURSE PRACTITIONER

## 2024-04-08 NOTE — PROGRESS NOTES
Surgical Oncology Follow Up       [address]  [facility]  [address]  [place] [address]    Maura Wong  1980  [id number]      Chief Complaint   Patient presents with    Consult       Assessment/Plan:  1. At high risk for breast cancer    2. Increased risk of breast cancer  - Consume healthy diet, exercise regularly, maintain healthy weight, limit alcohol, do not smoke  - Self breast awareness reviewed  - Discussed option of chemoprevention    - BUN; Future  - Creatinine, serum; Future  - MRI breast bilateral w and wo contrast w cad; Future    3. Family history of breast cancer  - BUN; Future  - Creatinine, serum; Future  - MRI breast bilateral w and wo contrast w cad; Future         Discussion/Summary: Patient is a 43-year-old female that has a family history of breast cancer and an increased risk of breast cancer.  I have calculated her lifetime TC risk to be 21%.  I reviewed risk reducing measures with her.  I recommended annual 3D mammography, annual breast MRI and clinical breast exams every 6 months.  There are no worrisome findings on her clinical exam today.  She had bilateral mammogram in May 2023 which was BI-RADS 1, category 1 density.  She is scheduled for another mammogram in August of this year.  I have therefore recommended that she have a breast MRI performed now.  I have also recommended clinical breast exams every 6 months.  She reports that she sees her gynecologist for her annual visit in June.  I will therefore see her back in December and then annually thereafter.  I encouraged her to be self breast aware and to contact me with any changes on self-exam.  I encouraged her to follow-up with a menopause/hormone specialist to discuss the pros and cons of hormone replacement therapy as well as the associated breast cancer risks.  She is in agreement with this recommendation.  All of her questions were answered today.    History of  Present Illness:     Test(s): [facility] [employer] Cancer Panel +RNA (47 genes): APC, TAMIE, AXIN2, BARD1, BMPR1A, BRCA1, BRCA2, BRIP1, CDH1, CDK4, CDKN2A, CHEK2, CTNNA1, DICER1, EPCAM, GREM1, HOXB13, KIT, MEN1, MLH1, MSH2, MSH3, MSH6, MUTYH, NBN, NF1, NTHL1, PALB2, PDGFRA, PMS2, POLD1, POLE, PTEN, RAD50, RAD51C, RAD51D, SDHA, SDHB, SDHC, SDHD, SMAD4, SMARCA4, STK11, TP53, TSC1, TSC2, VHL     Result: Negative - No Clinically Significant Variants Detected        -Interval History: Patient is a 43-year-old female that presents for a consultation regarding a family history of breast cancer, increased risk of breast cancer.  Her mother was diagnosed with a hormone positive breast cancer at the age of 43.  Her paternal grandmother was diagnosed at the age of 42 and a paternal aunt at the age of 53.  Patient underwent genetic testing which was negative.  She has not aware of anyone else in her family undergoing genetic testing.  She underwent a bilateral breast reduction in 2008.  She had an MRI which resulted in a benign biopsy in December 2022.  She has not appreciated any changes on self breast exam.  She is perimenopausal and is interested in hormone replacement therapy.  Menarche age 10, 1 pregnancy, 1 live birth.  She was 35 at the time her daughter was born.    Review of Systems:  Review of Systems   Constitutional:  Positive for fatigue. Negative for activity change, appetite change, chills, fever and unexpected weight change.   HENT:  Positive for postnasal drip.    Respiratory:  Negative for cough and shortness of breath.    Cardiovascular:  Negative for chest pain.   Gastrointestinal:  Negative for abdominal pain, constipation, diarrhea, nausea and vomiting.   Musculoskeletal:  Negative for arthralgias, back pain, gait problem and myalgias.   Skin:  Negative for color change and rash.   Allergic/Immunologic: Positive for environmental allergies and food allergies.   Neurological:  Negative for dizziness and  headaches.   Hematological:  Negative for adenopathy.   Psychiatric/Behavioral:  Negative for agitation and confusion.    All other systems reviewed and are negative.      Patient Active Problem List   Diagnosis    Allergic rhinitis    Asthma    Herpes simplex infection    Thoracic myofascial strain    Hemangioma of liver    Major depressive disorder, recurrent, moderate (HCC)    Arthritis    BMI 30.0-30.9,adult    Enlarged thyroid gland    Posterior tibial tendon dysfunction (PTTD) of left lower extremity    Binge eating disorder    Anxiety    Increased risk of breast cancer    Type 2 diabetes mellitus without complication, without long-term current use of insulin (HCC)    Family history of breast cancer     Past Medical History:   Diagnosis Date    Allergic     Allergic rhinitis     Anxiety     Arthritis     Asthma     Complex ovarian cyst     last assessed: 2014    Condyloma acuminatum     Depression     Dysmenorrhea     last assessed: 2013    Eating disorder 2020    in recovery BED    H/O human papillomavirus infection     Headache(784.0)     History of ankle surgery     Migraine     Palpitations     last assessed: 2013    TMJ dysfunction     Varicella May 1985     Past Surgical History:   Procedure Laterality Date    ANKLE FRACTURE SURGERY Left     BREAST BIOPSY Left     mri guided    benign    CERVICAL BIOPSY  W/ LOOP ELECTRODE EXCISION       SECTION      low transverse    COSMETIC SURGERY      Breast reduction    KNEE SURGERY      MRI BREAST BIOPSY LEFT (ALL INCLUSIVE) Left 2022    REDUCTION MAMMAPLASTY      ROTATOR CUFF REPAIR  2014    TUBAL LIGATION       Family History   Problem Relation Age of Onset    Breast cancer Mother 43        diagnosed at 43    Cancer Mother         Breast Cancer    Cancer Father         Amyloidosis    Asthma Father     Breast cancer Paternal Grandmother 42        diagnosed, death at 48    Cancer Paternal Grandmother          Breast cancer    Breast cancer Paternal Aunt 52    Cancer Maternal Grandfather         ureter/Kidney    No Known Problems Daughter     No Known Problems Paternal Aunt      Social History     Socioeconomic History    Marital status: /Civil Union     Spouse name: Not on file    Number of children: Not on file    Years of education: Not on file    Highest education level: Not on file   Occupational History    Not on file   Tobacco Use    Smoking status: Former     Current packs/day: 0.00     Average packs/day: 0.5 packs/day for 10.0 years (5.0 ttl pk-yrs)     Types: Cigarettes     Start date: 5/18/2003     Quit date: 5/18/2013     Years since quitting: 10.8    Smokeless tobacco: Never    Tobacco comments:     former smoker (as per allscripts)   Vaping Use    Vaping status: Some Days   Substance and Sexual Activity    Alcohol use: No    Drug use: Yes     Types: Marijuana     Comment: medical marijuana    Sexual activity: Yes     Partners: Male     Birth control/protection: Female Sterilization   Other Topics Concern    Not on file   Social History Narrative    Always uses seat belt    Denied: history of daily caffeinated coffee consumption    Exercises strenuously less than 3 times a week     Social Determinants of Health     Financial Resource Strain: Not on file   Food Insecurity: Not on file   Transportation Needs: Not on file   Physical Activity: Not on file   Stress: Not on file   Social Connections: Not on file   Intimate Partner Violence: Not on file   Housing Stability: Not on file       Current Outpatient Medications:     albuterol (Ventolin HFA) 90 mcg/act inhaler, Inhale 2 puffs every 6 (six) hours as needed for wheezing or shortness of breath, Disp: 18 g, Rfl: 1    Continuous Blood Gluc  (FreeStyle Adriana 2 [place]) ALLA, Check blood sugars multiple times per day, Disp: 1 each, Rfl: 0    Continuous Blood Gluc Sensor (FreeStyle Adriana 2 Sensor) MISC, Check blood sugars multiple times per day,  Disp: 6 each, Rfl: 3    escitalopram (LEXAPRO) 20 mg tablet, Take 1 tablet (20 mg total) by mouth daily, Disp: 90 tablet, Rfl: 3    hydrOXYzine HCL (ATARAX) 25 mg tablet, Take 1 tablet (25 mg total) by mouth daily at bedtime as needed for anxiety, Disp: 90 tablet, Rfl: 0    loratadine (CLARITIN) 10 mg tablet, Take 10 mg by mouth daily, Disp: , Rfl:     metFORMIN (GLUCOPHAGE) 500 mg tablet, Take 1 tablet (500 mg total) by mouth daily with breakfast, Disp: 90 tablet, Rfl: 1    montelukast (SINGULAIR) 10 mg tablet, Take 1 tablet (10 mg total) by mouth daily at bedtime, Disp: 90 tablet, Rfl: 3  Allergies   Allergen Reactions    Pollen Extract Allergic Rhinitis    Codeine      Other reaction(s): Unknown Reaction    Demerol [Meperidine] Rash    Other      Annotation - [id number]: mushroom     Vitals:    04/08/24 0935   BP: 114/78   Pulse: 56   Resp: 16   Temp: 97.6 °F (36.4 °C)   SpO2: 97%       Physical Exam  Vitals reviewed.   Constitutional:       Appearance: Normal appearance.   HENT:      Head: Normocephalic and atraumatic.   Pulmonary:      Effort: Pulmonary effort is normal.   Chest:   Breasts:     Right: Skin change (reduction scars) present. No swelling, bleeding, inverted nipple, mass, nipple discharge or tenderness.      Left: Skin change (reduction scars) present. No swelling, bleeding, inverted nipple, mass, nipple discharge or tenderness.   Lymphadenopathy:      Upper Body:      Right upper body: No supraclavicular or axillary adenopathy.      Left upper body: No supraclavicular or axillary adenopathy.   Skin:     General: Skin is warm and dry.   Neurological:      General: No focal deficit present.      Mental Status: She is alert and oriented to person, place, and time.   Psychiatric:         Mood and Affect: Mood normal.           Results:        Imaging    DIAGNOSIS: Encounter for screening mammogram for malignant neoplasm of breast      TECHNIQUE:  Digital screening mammography was performed. Computer  Aided Detection (CAD) analyzed all applicable images.   COMPARISONS: Prior breast imaging dated: 05/09/2022, 05/07/2021, 02/27/2020, 02/19/2020, 12/01/2018, 11/30/2017, and 11/30/2017     RELEVANT HISTORY:   Family Breast Cancer History: History of breast cancer in Mother, Paternal Grandmother, Paternal Aunt.  Family Medical History: Family medical history includes breast cancer in 3 relatives (mother, paternal aunt, paternal grandmother).   Personal History: No known relevant hormone history. Surgical history includes breast biopsy and breast reduction. No known relevant medical history.     The patient is scheduled in a reminder system for screening mammography.     8-10% of cancers will be missed on mammography. Management of a palpable abnormality must be based on clinical grounds.  Patients will be notified of their results via letter from our facility. Accredited by [employer] and FDA.     RISK ASSESSMENT:   5 Year Tyrer-Cuzick: 2.69 %  10 Year Tyrer-Cuzick: 5.68 %  Lifetime Tyrer-Cuzick: 24.57 %     TISSUE DENSITY:   The breasts are almost entirely fatty.      INDICATION: Maura Wong is a 42 y.o. female presenting for screening mammography.     FINDINGS:   There are no suspicious masses, grouped microcalcifications or areas of architectural distortion. The skin and nipple areolar complex are unremarkable.  There is a biopsy marker clip in the retroareolar left breast.     IMPRESSION:  No mammographic evidence of malignancy.     This patient has been identified as being at elevated risk for development of breast cancer based on the Tyrer-Cuzick model. She may benefit from supplemental screening.     ASSESSMENT/BI-RADS CATEGORY:  Left: 1 - Negative  Right: 1 - Negative  Overall: 1 - Negative     RECOMMENDATION:       - Routine screening mammogram in 1 year for both breasts.     Workstation ID: [id number]       Advance Care Planning/Advance Directives:  Discussed disease status and  treatment goals with the patient.

## 2024-04-17 ENCOUNTER — APPOINTMENT (OUTPATIENT)
Dept: LAB | Age: 44
End: 2024-04-17

## 2024-04-17 DIAGNOSIS — Z00.6 ENCOUNTER FOR EXAMINATION FOR NORMAL COMPARISON OR CONTROL IN CLINICAL RESEARCH PROGRAM: ICD-10-CM

## 2024-04-17 PROCEDURE — 36415 COLL VENOUS BLD VENIPUNCTURE: CPT

## 2024-04-30 ENCOUNTER — TELEPHONE (OUTPATIENT)
Dept: PSYCHIATRY | Facility: CLINIC | Age: 44
End: 2024-04-30

## 2024-04-30 NOTE — TELEPHONE ENCOUNTER
Called and left vm requesting a call back to see if patient would like to schedule f/u or if she want to be discharged.

## 2024-05-08 LAB
APOB+LDLR+PCSK9 GENE MUT ANL BLD/T: NOT DETECTED
BRCA1+BRCA2 DEL+DUP + FULL MUT ANL BLD/T: NOT DETECTED
MLH1+MSH2+MSH6+PMS2 GN DEL+DUP+FUL M: NOT DETECTED

## 2024-05-14 ENCOUNTER — OFFICE VISIT (OUTPATIENT)
Dept: FAMILY MEDICINE CLINIC | Facility: CLINIC | Age: 44
End: 2024-05-14
Payer: COMMERCIAL

## 2024-05-14 VITALS
BODY MASS INDEX: 39.06 KG/M2 | HEIGHT: 66 IN | OXYGEN SATURATION: 98 % | DIASTOLIC BLOOD PRESSURE: 68 MMHG | TEMPERATURE: 96.5 F | SYSTOLIC BLOOD PRESSURE: 100 MMHG | HEART RATE: 69 BPM

## 2024-05-14 DIAGNOSIS — F33.1 MODERATE EPISODE OF RECURRENT MAJOR DEPRESSIVE DISORDER (HCC): ICD-10-CM

## 2024-05-14 DIAGNOSIS — J45.40 MODERATE PERSISTENT ASTHMA WITHOUT COMPLICATION: ICD-10-CM

## 2024-05-14 DIAGNOSIS — E11.9 TYPE 2 DIABETES MELLITUS WITHOUT COMPLICATION, WITHOUT LONG-TERM CURRENT USE OF INSULIN (HCC): Primary | ICD-10-CM

## 2024-05-14 LAB
CREAT UR-MCNC: 237.5 MG/DL
MICROALBUMIN UR-MCNC: 13.7 MG/L
MICROALBUMIN/CREAT 24H UR: 6 MG/G CREATININE (ref 0–30)
SL AMB POCT HEMOGLOBIN AIC: 5.1 (ref ?–6.5)

## 2024-05-14 PROCEDURE — 82043 UR ALBUMIN QUANTITATIVE: CPT | Performed by: FAMILY MEDICINE

## 2024-05-14 PROCEDURE — 99214 OFFICE O/P EST MOD 30 MIN: CPT | Performed by: FAMILY MEDICINE

## 2024-05-14 PROCEDURE — 83036 HEMOGLOBIN GLYCOSYLATED A1C: CPT | Performed by: FAMILY MEDICINE

## 2024-05-14 PROCEDURE — 82570 ASSAY OF URINE CREATININE: CPT | Performed by: FAMILY MEDICINE

## 2024-05-14 RX ORDER — ESCITALOPRAM OXALATE 20 MG/1
20 TABLET ORAL DAILY
Qty: 90 TABLET | Refills: 3 | Status: SHIPPED | OUTPATIENT
Start: 2024-05-14

## 2024-05-14 RX ORDER — MONTELUKAST SODIUM 10 MG/1
10 TABLET ORAL
Qty: 90 TABLET | Refills: 3 | Status: SHIPPED | OUTPATIENT
Start: 2024-05-14

## 2024-05-14 RX ORDER — ROSUVASTATIN CALCIUM 5 MG/1
5 TABLET, COATED ORAL DAILY
Qty: 90 TABLET | Refills: 3 | Status: SHIPPED | OUTPATIENT
Start: 2024-05-14

## 2024-05-14 NOTE — PROGRESS NOTES
Assessment/Plan:    No problem-specific Assessment & Plan notes found for this encounter.       Diagnoses and all orders for this visit:    Type 2 diabetes mellitus without complication, without long-term current use of insulin (HCC)  -     Albumin / creatinine urine ratio  -     POCT hemoglobin A1c  -     rosuvastatin (CRESTOR) 5 mg tablet; Take 1 tablet (5 mg total) by mouth daily    Moderate episode of recurrent major depressive disorder (HCC)  -     escitalopram (LEXAPRO) 20 mg tablet; Take 1 tablet (20 mg total) by mouth daily    Moderate persistent asthma without complication  -     montelukast (SINGULAIR) 10 mg tablet; Take 1 tablet (10 mg total) by mouth daily at bedtime      - A1C has greatly improved to 5.1. Continue metformin 500mg daily in conjunction with diet and lifestyle modifications. Diabetic foot  exam performed today. Urine microalbumin/creatinine ratio ordered. Discussed starting statin medication as well and patient is agreeable; start crestor 5mg daily   - Mood stable on lexapro 20mg daily   - Asthma stable on singular 10mg daily and albuterol PRN    Subjective:      Patient ID: Maura Wong is a 43 y.o. female.    HPI  Maura Wong is a very pleasant 43 year old female who presents today for a follow up. Since previous office visit she started working with a nutritionist whom she has a good relationship with. Her nutritionist did suggest she see an endocrinologist and she has an upcoming appointment with them later this week. In addition to adhering a healthy well balanced diet she has also been exercising at least 3 times a week but walking at least every day. She continues to follow with Gastroenterology. He    The following portions of the patient's history were reviewed and updated as appropriate: allergies, current medications, past family history, past medical history, past social history, past surgical history, and problem list.    Review of Systems      Objective:      /68  "(BP Location: Right arm, Patient Position: Sitting, Cuff Size: Extra-Large)   Pulse 69   Temp (!) 96.5 °F (35.8 °C) (Temporal)   Ht 5' 6\" (1.676 m)   LMP 05/10/2024   SpO2 98%   BMI 39.06 kg/m²          Physical Exam  Cardiovascular:      Pulses: no weak pulses.   Feet:      Right foot:      Skin integrity: No ulcer, skin breakdown, erythema, warmth, callus or dry skin.      Left foot:      Skin integrity: No ulcer, skin breakdown, erythema, warmth, callus or dry skin.                       Patient's shoes and socks removed.    Right Foot/Ankle   Right Foot Inspection  Skin Exam: skin normal and skin intact. No dry skin, no warmth, no callus, no erythema, no maceration, no abnormal color, no pre-ulcer, no ulcer and no callus.     Toe Exam: ROM and strength within normal limits.     Left Foot/Ankle  Left Foot Inspection  Skin Exam: skin normal and skin intact. No dry skin, no warmth, no erythema, no maceration, normal color, no pre-ulcer, no ulcer and no callus.     Toe Exam: ROM and strength within normal limits.     Assign Risk Category  No deformity present  No loss of protective sensation  No weak pulses  Risk: 0      "

## 2024-05-16 ENCOUNTER — OFFICE VISIT (OUTPATIENT)
Dept: ENDOCRINOLOGY | Facility: CLINIC | Age: 44
End: 2024-05-16
Payer: COMMERCIAL

## 2024-05-16 VITALS
HEIGHT: 66 IN | BODY MASS INDEX: 39.06 KG/M2 | HEART RATE: 63 BPM | SYSTOLIC BLOOD PRESSURE: 100 MMHG | OXYGEN SATURATION: 97 % | DIASTOLIC BLOOD PRESSURE: 70 MMHG

## 2024-05-16 DIAGNOSIS — E55.9 VITAMIN D DEFICIENCY: ICD-10-CM

## 2024-05-16 DIAGNOSIS — E04.2 MULTIPLE THYROID NODULES: ICD-10-CM

## 2024-05-16 DIAGNOSIS — E11.9 TYPE 2 DIABETES MELLITUS WITHOUT COMPLICATION, WITHOUT LONG-TERM CURRENT USE OF INSULIN (HCC): Primary | ICD-10-CM

## 2024-05-16 PROCEDURE — 95251 CONT GLUC MNTR ANALYSIS I&R: CPT | Performed by: INTERNAL MEDICINE

## 2024-05-16 PROCEDURE — 99204 OFFICE O/P NEW MOD 45 MIN: CPT | Performed by: INTERNAL MEDICINE

## 2024-05-16 RX ORDER — METFORMIN HYDROCHLORIDE 500 MG/1
TABLET, EXTENDED RELEASE ORAL
Qty: 180 TABLET | Refills: 3 | Status: SHIPPED | OUTPATIENT
Start: 2024-05-16

## 2024-05-16 NOTE — PROGRESS NOTES
Maura Wong 44 y.o. female MRN: [id number]    Encounter: [id number]      Assessment & Plan     Problem List Items Addressed This Visit          Endocrine    Multiple thyroid nodules     Will get thyroid ultrasound to monitor for any change in the size or characteristic of thyroid nodules         Relevant Orders    T4, free    TSH, 3rd generation    Type 2 diabetes mellitus without complication, without long-term current use of insulin (HCC) - Primary       Lab Results   Component Value Date    HGBA1C 5.1 2024   Encouraged to continue dietary and lifestyle modification and weight loss-given diarrhea will switch to metformin extended release 2 tablets with dinner  History of eating disorder-would be hesitant to use GLP-1 agonist         Relevant Medications    metFORMIN (GLUCOPHAGE-XR) 500 mg 24 hr tablet    Other Relevant Orders    Hemoglobin A1C    Comprehensive metabolic panel       Other    Vitamin D deficiency     Continue supplementations         Relevant Orders    Vitamin D 25 hydroxy        CC: Diabetes    History of Present Illness     HPI:  44-year-old female here for evaluation of type 2 diabetes.   Type 2 Diabetes diagnosed in 2024 currently on metformin    Had binge eating disorder and she has seen psychiatry in the past for that    No polyuria , polydipsia , no blurry vision , + numbness and tingling in feet     Current regimen - metformin 500 mg daily     Occasional diarrhea     Has been seeing nutritionist virtually - has cut out juices /sugar sodas / more fresh fruits and protein   Doesn't weigh herself  - but has lost weight   Seeing GI for fatty liver     No history of GDM ,  , baby 8.5 lbs     Has been using a personal CGM  Maura Wong   Device used V[employer] zoya  Home use       Indication     Type 2 Diabetes    More than 72 hours of data was reviewed. Report to be scanned to chart.     Date Range: May 3 till 2024    Analysis of data:   Average Glucose: 110 Mg per  DL  Coefficient of Variation: 13.5%  Time in Target Range: 100%  Time Above Range: 0%  Time Below Range: 0%    Interpretation of data: Sugars been very well-controlled with an average glucose of 110 Mg per DL    Review of Systems    Historical Information   Past Medical History:   Diagnosis Date    Allergic     Allergic rhinitis     Anxiety     Arthritis     Asthma     Complex ovarian cyst     last assessed: 2014    Condyloma acuminatum     Depression     Dysmenorrhea     last assessed: 2013    Eating disorder 2020    in recovery BED    H/O human papillomavirus infection     Headache(784.0)     History of ankle surgery     Migraine     Palpitations     last assessed: 2013    TMJ dysfunction     Varicella May 1985     Past Surgical History:   Procedure Laterality Date    ANKLE FRACTURE SURGERY Left     BREAST BIOPSY Left     mri guided    benign    CERVICAL BIOPSY  W/ LOOP ELECTRODE EXCISION       SECTION      low transverse    COSMETIC SURGERY      Breast reduction    KNEE SURGERY      MRI BREAST BIOPSY LEFT (ALL INCLUSIVE) Left 2022    REDUCTION MAMMAPLASTY      ROTATOR CUFF REPAIR  2014    TUBAL LIGATION       Social History   Social History     Substance and Sexual Activity   Alcohol Use No     Social History     Substance and Sexual Activity   Drug Use Yes    Types: Marijuana    Comment: medical marijuana     Social History     Tobacco Use   Smoking Status Former    Current packs/day: 0.00    Average packs/day: 0.5 packs/day for 10.0 years (5.0 ttl pk-yrs)    Types: Cigarettes    Start date: 2003    Quit date: 2013    Years since quittin.0   Smokeless Tobacco Never   Tobacco Comments    former smoker (as per allscripts)     Family History:   Family History   Problem Relation Age of Onset    Thyroid disease unspecified Mother     Breast cancer Mother 43        diagnosed at 43    Cancer Mother         Breast Cancer    Cancer Father          "Amyloidosis    Asthma Father     Breast cancer Paternal Aunt 52    No Known Problems Paternal Aunt     Cancer Maternal Grandfather         ureter/Kidney    Breast cancer Paternal Grandmother 42        diagnosed, death at 48    Cancer Paternal Grandmother         Breast cancer    No Known Problems Daughter        Meds/Allergies   Current Outpatient Medications   Medication Sig Dispense Refill    albuterol (Ventolin HFA) 90 mcg/act inhaler Inhale 2 puffs every 6 (six) hours as needed for wheezing or shortness of breath 18 g 1    Continuous Blood Gluc  (FreeStyle Adriana 2 [place]) ALLA Check blood sugars multiple times per day 1 each 0    Continuous Blood Gluc Sensor (FreeStyle Adriana 2 Sensor) MISC Check blood sugars multiple times per day 6 each 3    escitalopram (LEXAPRO) 20 mg tablet Take 1 tablet (20 mg total) by mouth daily 90 tablet 3    hydrOXYzine HCL (ATARAX) 25 mg tablet Take 1 tablet (25 mg total) by mouth daily at bedtime as needed for anxiety 90 tablet 0    loratadine (CLARITIN) 10 mg tablet Take 10 mg by mouth daily      metFORMIN (GLUCOPHAGE-XR) 500 mg 24 hr tablet 2 tabs with with dinner 180 tablet 3    montelukast (SINGULAIR) 10 mg tablet Take 1 tablet (10 mg total) by mouth daily at bedtime 90 tablet 3    rosuvastatin (CRESTOR) 5 mg tablet Take 1 tablet (5 mg total) by mouth daily 90 tablet 3     No current facility-administered medications for this visit.     Allergies   Allergen Reactions    Pollen Extract Allergic Rhinitis    Codeine      Other reaction(s): Unknown Reaction    Demerol [Meperidine] Rash    Other      Annotation - [id number]: mushroom       Objective   Vitals: Blood pressure 100/70, pulse 63, height 5' 6\" (1.676 m), last menstrual period 05/10/2024, SpO2 97%, not currently breastfeeding.    Physical Exam  Vitals reviewed.   Constitutional:       General: She is not in acute distress.     Appearance: Normal appearance. She is obese. She is not ill-appearing, toxic-appearing or " diaphoretic.   HENT:      Head: Normocephalic and atraumatic.   Eyes:      General: No scleral icterus.     Extraocular Movements: Extraocular movements intact.   Cardiovascular:      Rate and Rhythm: Normal rate and regular rhythm.      Heart sounds: Normal heart sounds. No murmur heard.  Pulmonary:      Effort: Pulmonary effort is normal. No respiratory distress.      Breath sounds: Normal breath sounds. No wheezing or rales.   Musculoskeletal:      Cervical back: Neck supple.      Right lower leg: No edema.      Left lower leg: No edema.   Lymphadenopathy:      Cervical: No cervical adenopathy.   Skin:     General: Skin is warm and dry.   Neurological:      General: No focal deficit present.      Mental Status: She is alert and oriented to person, place, and time.      Gait: Gait normal.   Psychiatric:         Mood and Affect: Mood normal.         Behavior: Behavior normal.         Thought Content: Thought content normal.         Judgment: Judgment normal.         The history was obtained from the review of the chart, patient.    Lab Results:   Lab Results   Component Value Date/Time    Hemoglobin A1C 5.1 05/14/2024 09:22 AM    Hemoglobin A1C 6.8 (H) 02/03/2024 08:44 AM    WBC 6.99 02/03/2024 08:45 AM    WBC 7.88 01/28/2024 10:00 PM    Hemoglobin 13.4 02/03/2024 08:45 AM    Hemoglobin 11.8 01/28/2024 10:00 PM    Hematocrit 40.9 02/03/2024 08:45 AM    Hematocrit 34.5 (L) 01/28/2024 10:00 PM    MCV 95 02/03/2024 08:45 AM    MCV 92 01/28/2024 10:00 PM    Platelets 254 02/03/2024 08:45 AM    Platelets 212 01/28/2024 10:00 PM    BUN 10 02/03/2024 08:45 AM    BUN 10 01/28/2024 10:00 PM    Potassium 3.6 02/03/2024 08:45 AM    Potassium 3.3 (L) 01/28/2024 10:00 PM    Chloride 104 02/03/2024 08:45 AM    Chloride 103 01/28/2024 10:00 PM    CO2 29 02/03/2024 08:45 AM    CO2 27 01/28/2024 10:00 PM    Creatinine 0.81 02/03/2024 08:45 AM    Creatinine 0.74 01/28/2024 10:00 PM    AST 62 (H) 02/03/2024 08:45 AM    AST 22  01/28/2024 10:00 PM    ALT 57 (H) 02/03/2024 08:45 AM    ALT 27 01/28/2024 10:00 PM    Total Protein 7.3 02/03/2024 08:45 AM    Total Protein 6.8 01/28/2024 10:00 PM    Albumin 4.5 02/03/2024 08:45 AM    Albumin 4.0 01/28/2024 10:00 PM    HDL, Direct 35 (L) 02/03/2024 08:45 AM    Triglycerides 179 (H) 02/03/2024 08:45 AM           Imaging Studies: I have personally reviewed pertinent reports.          Narrative & Impression   THYROID ULTRASOUND     INDICATION: E04.9: Nontoxic goiter, unspecified  E03.9: Hypothyroidism, unspecified.     COMPARISON: Thyroid ultrasound 3/7/2023     TECHNIQUE: Ultrasound of the thyroid was performed with a high frequency linear transducer in transverse and sagittal planes including volumetric imaging sweeps as well as traditional still imaging technique.     FINDINGS:  Thyroid texture: Normal homogeneous smooth echotexture.     Right lobe: 4.9 x 1.3 x 1.8 cm. Volume 5.5 mL  Left lobe: 4.0 x 1.0 x 1.1 cm. Volume 2.0 mL  Isthmus: 0.2 cm.     Nodule #1. Image 14 and 15.  RIGHT midgland nodule measuring 1.3 x 0.9 x 1.0 cm. Unchanged from prior.  COMPOSITION: 2 points, solid or almost completely solid.  ECHOGENICITY: 1 point, hyperechoic or isoechoic.  SHAPE: 0 points, wider-than-tall.  MARGIN: 0 points, smooth.  ECHOGENIC FOCI: 0 points, none or large comet-tail artifacts.  TI-RADS Classification: TR 3 (3 points), FNA if > 2.5 cm. Follow if > 1.5 cm..     Nodule #2. Image 17.  RIGHT lower pole nodule measuring 0.7 x 0.5 x 0.7 cm.  This nodule has decreased in size from prior.  COMPOSITION: 2 points, solid or almost completely solid.  ECHOGENICITY: 1 point, hyperechoic or isoechoic.  SHAPE: 0 points, wider-than-tall.  MARGIN: 0 points, smooth.  ECHOGENIC FOCI: 0 points, none or large comet-tail artifacts.  TI-RADS Classification: TR 3 (3 points), FNA if >2.5 cm.  Follow if >1.5 cm.              IMPRESSION:     No nodule meets current ACR criteria for requiring biopsy or follow-up  "ultrasounds.        Portions of the record may have been created with voice recognition software. Occasional wrong word or \"sound a like\" substitutions may have occurred due to the inherent limitations of voice recognition software. Read the chart carefully and recognize, using context, where substitutions have occurred.    "

## 2024-05-21 NOTE — ASSESSMENT & PLAN NOTE
Lab Results   Component Value Date    HGBA1C 5.1 05/14/2024   Encouraged to continue dietary and lifestyle modification and weight loss-given diarrhea will switch to metformin extended release 2 tablets with dinner  History of eating disorder-would be hesitant to use GLP-1 agonist

## 2024-05-21 NOTE — ASSESSMENT & PLAN NOTE
Will get thyroid ultrasound to monitor for any change in the size or characteristic of thyroid nodules

## 2024-06-04 ENCOUNTER — OFFICE VISIT (OUTPATIENT)
Dept: GASTROENTEROLOGY | Facility: MEDICAL CENTER | Age: 44
End: 2024-06-04
Payer: COMMERCIAL

## 2024-06-04 VITALS
BODY MASS INDEX: 39.06 KG/M2 | HEART RATE: 84 BPM | SYSTOLIC BLOOD PRESSURE: 116 MMHG | OXYGEN SATURATION: 96 % | TEMPERATURE: 97.3 F | HEIGHT: 66 IN | DIASTOLIC BLOOD PRESSURE: 68 MMHG

## 2024-06-04 DIAGNOSIS — K75.81 NASH (NONALCOHOLIC STEATOHEPATITIS): Primary | ICD-10-CM

## 2024-06-04 DIAGNOSIS — K57.90 DIVERTICULOSIS: ICD-10-CM

## 2024-06-04 PROCEDURE — 99214 OFFICE O/P EST MOD 30 MIN: CPT | Performed by: NURSE PRACTITIONER

## 2024-06-04 RX ORDER — RESMETIROM 100 MG/1
100 TABLET, COATED ORAL DAILY
Qty: 90 TABLET | Refills: 1 | Status: CANCELLED | OUTPATIENT
Start: 2024-06-04

## 2024-06-04 NOTE — PROGRESS NOTES
[facility] - Outpatient Follow-up Note  Maura Wong 44 y.o. female MRN: [id number]  Encounter: [id number]          ASSESSMENT AND PLAN:      1.  Hepatomegaly  2.  VERMA  3.  Liver hemangioma  4.  Elevated LESLIE    Recent ultrasound elastography noted S3, F2.  Recent AST 62, ALT 57, alk phos 54, total bili 0.43.  Liver serology workup negative other than LESLIE 640.  Anti-LK M and immunoglobulins were negative/normal.  We did have a long discussion about VERMA disease process potential complications and treatment.  Patient was recently diagnosed with diabetes.  She currently has been losing weight.  She has seen an endocrinologist.  We did discuss Rezdiffra further treatment of VERMA reduce fibrosis and improve steatosis.  She will consider treatment.  Will recommend repeat LFTs in 3 months and lifestyle modifications.    -Lifestyle modifications including low-fat/low-cholesterol diet, glycemic control, weight reduction  -Follow-up in office in 3 to 4 months  ______________________________________________________________________    SUBJECTIVE: 44-year-old female here for follow-up.  She was last seen by myself 2/2/2024 for history of sigmoid diverticulitis, hepatomegaly, fatty liver and liver hemangioma.    History of hepatomegaly, fatty liver and liver hemangioma.  Liver serology workup negative other than LESLIE positive-titer 640.  Anti-LK M-, AMA negative, Sjogren's antibody negative, IgG normal, IgM normal.  AST 62, ALT 57, alk phos 54, total bili 0.43.  Alpha-1 antitrypsin normal, ceruloplasmin normal, iron studies normal, celiac panel negative.  Hepatitis profile negative.    History of liver hemangioma but not seen on recent CT.  CT recently noted marked hepatomegaly and hepatic steatosis with numerous scattered low-density lesions likely cysts overall not significantly changed compared to previous exam dated 12/22.      BMs are brown and formed daily.  Denies any melena  hematochezia.    Ultrasound elastography -S3, F2.    Prior EGD/colonoscopy     Colonoscopy 2023-2 subcentimeter polyps and diverticulosis.  Repeat colonoscopy recommended in 7 years.  Biopsies revealed 1 tubular adenoma and 1 hyperplastic polyp.     REVIEW OF SYSTEMS IS OTHERWISE NEGATIVE.  10 point review of systems negative other than per HPI.      Historical Information   Past Medical History:   Diagnosis Date   • Allergic    • Allergic rhinitis    • Anxiety    • Arthritis    • Asthma    • Complex ovarian cyst     last assessed: 2014   • Condyloma acuminatum    • Depression    • Diabetes mellitus (HCC)    • Dysmenorrhea     last assessed: 2013   • Eating disorder 2020    in recovery BED   • H/O human papillomavirus infection    • Headache(784.0)    • History of ankle surgery    • Migraine    • Palpitations     last assessed: 2013   • TMJ dysfunction    • Varicella May 1985     Past Surgical History:   Procedure Laterality Date   • ANKLE FRACTURE SURGERY Left    • BREAST BIOPSY Left     mri guided    benign   • CERVICAL BIOPSY  W/ LOOP ELECTRODE EXCISION     •  SECTION      low transverse   • COSMETIC SURGERY      Breast reduction   • KNEE SURGERY     • MRI BREAST BIOPSY LEFT (ALL INCLUSIVE) Left 2022   • REDUCTION MAMMAPLASTY     • ROTATOR CUFF REPAIR  2014   • TUBAL LIGATION       Social History   Social History     Substance and Sexual Activity   Alcohol Use Yes    Comment: rarely     Social History     Substance and Sexual Activity   Drug Use Yes   • Types: Marijuana    Comment: medical marijuana     Social History     Tobacco Use   Smoking Status Former   • Current packs/day: 0.00   • Average packs/day: 0.5 packs/day for 10.0 years (5.0 ttl pk-yrs)   • Types: Cigarettes   • Start date: 2003   • Quit date: 2013   • Years since quittin.0   Smokeless Tobacco Never   Tobacco Comments    former smoker (as per allscripts)  "    Family History   Problem Relation Age of Onset   • Thyroid disease unspecified Mother    • Breast cancer Mother 43        diagnosed at 43   • Cancer Mother         Breast Cancer   • Cancer Father         Amyloidosis   • Asthma Father    • Breast cancer Paternal Aunt 52   • No Known Problems Paternal Aunt    • Cancer Maternal Grandfather         ureter/Kidney   • Breast cancer Paternal Grandmother 42        diagnosed, death at 48   • Cancer Paternal Grandmother         Breast cancer   • No Known Problems Daughter        Meds/Allergies       Current Outpatient Medications:   •  albuterol (Ventolin HFA) 90 mcg/act inhaler  •  Continuous Blood Gluc  (FreeStyle Adriana 2 [place]) ALLA  •  Continuous Blood Gluc Sensor (FreeStyle Adriana 2 Sensor) MISC  •  escitalopram (LEXAPRO) 20 mg tablet  •  hydrOXYzine HCL (ATARAX) 25 mg tablet  •  loratadine (CLARITIN) 10 mg tablet  •  metFORMIN (GLUCOPHAGE-XR) 500 mg 24 hr tablet  •  montelukast (SINGULAIR) 10 mg tablet  •  rosuvastatin (CRESTOR) 5 mg tablet    Allergies   Allergen Reactions   • Pollen Extract Allergic Rhinitis   • Codeine      Other reaction(s): Unknown Reaction   • Demerol [Meperidine] Rash   • Other      Annotation - [id number]: mushroom           Objective     Blood pressure 116/68, pulse 84, temperature (!) 97.3 °F (36.3 °C), temperature source Tympanic, height 5' 6\" (1.676 m), last menstrual period 05/10/2024, SpO2 96%, not currently breastfeeding. Body mass index is 39.06 kg/m².      PHYSICAL EXAM:      General Appearance:   Alert, cooperative, no distress   HEENT:   Normocephalic, atraumatic, anicteric.     Neck:  Supple, symmetrical, trachea midline   Lungs:   Clear to auscultation bilaterally; no rales, rhonchi or wheezing; respirations unlabored    Heart::   Regular rate and rhythm; no murmur, rub, or gallop.   Abdomen:   Soft, non-tender, non-distended; normal bowel sounds; no masses, no organomegaly    Genitalia:   Deferred    Rectal:   Deferred  "   Extremities:  No cyanosis, clubbing or edema    Pulses:  2+ and symmetric    Skin:  No jaundice, rashes, or lesions    Lymph nodes:  No palpable cervical lymphadenopathy        Lab Results:   No visits with results within 1 Day(s) from this visit.   Latest known visit with results is:   Office Visit on 05/14/2024   Component Date Value   • Creatinine, Ur 05/14/2024 237.5    • Albumin,U,Random 05/14/2024 13.7    • Albumin Creat Ratio 05/14/2024 6    • Hemoglobin A1C 05/14/2024 5.1          Radiology Results:   No results found.

## 2024-07-18 ENCOUNTER — APPOINTMENT (OUTPATIENT)
Dept: LAB | Facility: MEDICAL CENTER | Age: 44
End: 2024-07-18
Payer: COMMERCIAL

## 2024-07-18 DIAGNOSIS — Z80.3 FAMILY HISTORY OF BREAST CANCER: ICD-10-CM

## 2024-07-18 DIAGNOSIS — Z91.89 INCREASED RISK OF BREAST CANCER: ICD-10-CM

## 2024-07-18 LAB
BUN SERPL-MCNC: 15 MG/DL (ref 5–25)
CREAT SERPL-MCNC: 0.78 MG/DL (ref 0.6–1.3)
GFR SERPL CREATININE-BSD FRML MDRD: 91 ML/MIN/1.73SQ M

## 2024-07-18 PROCEDURE — 36415 COLL VENOUS BLD VENIPUNCTURE: CPT

## 2024-07-18 PROCEDURE — 84520 ASSAY OF UREA NITROGEN: CPT

## 2024-07-18 PROCEDURE — 82565 ASSAY OF CREATININE: CPT

## 2024-07-20 ENCOUNTER — HOSPITAL ENCOUNTER (OUTPATIENT)
Dept: RADIOLOGY | Facility: HOSPITAL | Age: 44
Discharge: HOME | End: 2024-07-20
Payer: COMMERCIAL

## 2024-07-20 DIAGNOSIS — Z80.3 FAMILY HISTORY OF BREAST CANCER: ICD-10-CM

## 2024-07-20 DIAGNOSIS — Z91.89 INCREASED RISK OF BREAST CANCER: ICD-10-CM

## 2024-07-20 PROCEDURE — C8937 CAD BREAST MRI: HCPCS

## 2024-07-20 PROCEDURE — A9585 GADOBUTROL INJECTION: HCPCS | Performed by: NURSE PRACTITIONER

## 2024-07-20 PROCEDURE — C8908 MRI W/O FOL W/CONT, BREAST,: HCPCS

## 2024-07-20 RX ORDER — GADOBUTROL 604.72 MG/ML
11 INJECTION INTRAVENOUS
Status: COMPLETED | OUTPATIENT
Start: 2024-07-20 | End: 2024-07-20

## 2024-07-20 RX ADMIN — GADOBUTROL 11 ML: 604.72 INJECTION INTRAVENOUS at 14:36

## 2024-08-16 ENCOUNTER — LAB (OUTPATIENT)
Dept: LAB | Facility: MEDICAL CENTER | Age: 44
End: 2024-08-16
Payer: COMMERCIAL

## 2024-08-16 DIAGNOSIS — E04.2 MULTIPLE THYROID NODULES: ICD-10-CM

## 2024-08-16 DIAGNOSIS — K75.81 NASH (NONALCOHOLIC STEATOHEPATITIS): ICD-10-CM

## 2024-08-16 DIAGNOSIS — E11.9 TYPE 2 DIABETES MELLITUS WITHOUT COMPLICATION, WITHOUT LONG-TERM CURRENT USE OF INSULIN (HCC): ICD-10-CM

## 2024-08-16 DIAGNOSIS — E55.9 VITAMIN D DEFICIENCY: ICD-10-CM

## 2024-08-16 LAB
25(OH)D3 SERPL-MCNC: 28.4 NG/ML (ref 30–100)
ALBUMIN SERPL BCG-MCNC: 4.2 G/DL (ref 3.5–5)
ALP SERPL-CCNC: 53 U/L (ref 34–104)
ALT SERPL W P-5'-P-CCNC: 14 U/L (ref 7–52)
ANION GAP SERPL CALCULATED.3IONS-SCNC: 12 MMOL/L (ref 4–13)
AST SERPL W P-5'-P-CCNC: 13 U/L (ref 13–39)
BILIRUB DIRECT SERPL-MCNC: 0.08 MG/DL (ref 0–0.2)
BILIRUB SERPL-MCNC: 0.38 MG/DL (ref 0.2–1)
BUN SERPL-MCNC: 12 MG/DL (ref 5–25)
CALCIUM SERPL-MCNC: 9.3 MG/DL (ref 8.4–10.2)
CHLORIDE SERPL-SCNC: 102 MMOL/L (ref 96–108)
CO2 SERPL-SCNC: 24 MMOL/L (ref 21–32)
CREAT SERPL-MCNC: 0.81 MG/DL (ref 0.6–1.3)
EST. AVERAGE GLUCOSE BLD GHB EST-MCNC: 100 MG/DL
GFR SERPL CREATININE-BSD FRML MDRD: 87 ML/MIN/1.73SQ M
GLUCOSE P FAST SERPL-MCNC: 100 MG/DL (ref 65–99)
HBA1C MFR BLD: 5.1 %
POTASSIUM SERPL-SCNC: 4 MMOL/L (ref 3.5–5.3)
PROT SERPL-MCNC: 7.4 G/DL (ref 6.4–8.4)
SODIUM SERPL-SCNC: 138 MMOL/L (ref 135–147)
T4 FREE SERPL-MCNC: 0.79 NG/DL (ref 0.61–1.12)
TSH SERPL DL<=0.05 MIU/L-ACNC: 1.42 UIU/ML (ref 0.45–4.5)

## 2024-08-16 PROCEDURE — 36415 COLL VENOUS BLD VENIPUNCTURE: CPT

## 2024-08-16 PROCEDURE — 84443 ASSAY THYROID STIM HORMONE: CPT

## 2024-08-16 PROCEDURE — 82306 VITAMIN D 25 HYDROXY: CPT

## 2024-08-16 PROCEDURE — 82248 BILIRUBIN DIRECT: CPT

## 2024-08-16 PROCEDURE — 83036 HEMOGLOBIN GLYCOSYLATED A1C: CPT

## 2024-08-16 PROCEDURE — 84439 ASSAY OF FREE THYROXINE: CPT

## 2024-08-16 PROCEDURE — 80053 COMPREHEN METABOLIC PANEL: CPT

## 2024-08-19 ENCOUNTER — NURSE TRIAGE (OUTPATIENT)
Age: 44
End: 2024-08-19

## 2024-08-19 DIAGNOSIS — K57.90 DIVERTICULOSIS: Primary | ICD-10-CM

## 2024-08-19 NOTE — TELEPHONE ENCOUNTER
SPOKE WITH PT, HX DIVERTICULITIS, VERMA. LAST OV 6/4/24. PT STATES LEFT SIDED PAIN STARTED LAST NIGHT, WITH BLOATING, NAUSEA, LOW GRADE FEVER, CHILLS. PAIN IS CONSTANT RANGES IN SEVERITY, WORSE WITH MOVEMENT, TENDER TO TOUCH. PT IS PASSING GAS. PT DENIES VOMITING, BLACK OR BLOODY STOOLS. PT HYDRATING, POOR APPETITE. PT LAST DIVERTICULITIS EPISODE WAS IN JANUARY 2024, WAS TREATED WITH 10 DAY COURSE OF AUGMENTIN. PT USES [facility].         Reason for Disposition   Affirmative: The patient has nausea    Answer Questions - Initial Assessment  When did the pain start: LAST NIGHT    Is the pain constant or intermittent: CONSTANT    Does the pain radiate: DENIES    Is your LLQ pain tender to touch or worsens after pressing on the abdomen: YES    Do you have a history of diverticulitis: YES    Are you passing gas: YES    Have you noticed a change in your bowels: DENIES    Does anything make the pain worse: MOVEMENT    Any black or bloody stools: DENIES    Do you have a fever:     Are you able to eat and drink without difficulty: HYDRATING, POOR APPETITE    What was your prior treatment for diverticulitis: AUGMENTIN 10 DAYS    Protocols used: LLQ Pain

## 2024-08-20 ENCOUNTER — TELEPHONE (OUTPATIENT)
Dept: ENDOCRINOLOGY | Facility: CLINIC | Age: 44
End: 2024-08-20

## 2024-08-20 NOTE — TELEPHONE ENCOUNTER
CALLED [facility] PER CONSENT, INFORMING PT PRESCRIPTION FOR AUGMENTIN SENT TO HER PHARMACY. PLEASE CALL BACK IF ANY QUESTIONS.

## 2024-08-28 ENCOUNTER — RA CDI HCC (OUTPATIENT)
Dept: OTHER | Facility: HOSPITAL | Age: 44
End: 2024-08-28

## 2024-09-04 ENCOUNTER — ANNUAL EXAM (OUTPATIENT)
Dept: OBGYN CLINIC | Facility: CLINIC | Age: 44
End: 2024-09-04
Payer: COMMERCIAL

## 2024-09-04 VITALS — BODY MASS INDEX: 37.9 KG/M2 | DIASTOLIC BLOOD PRESSURE: 74 MMHG | HEIGHT: 67 IN | SYSTOLIC BLOOD PRESSURE: 122 MMHG

## 2024-09-04 DIAGNOSIS — N64.4 BREAST PAIN, LEFT: ICD-10-CM

## 2024-09-04 DIAGNOSIS — N39.3 SUI (STRESS URINARY INCONTINENCE, FEMALE): ICD-10-CM

## 2024-09-04 DIAGNOSIS — N84.1 CERVICAL POLYP: ICD-10-CM

## 2024-09-04 DIAGNOSIS — Z11.51 SCREENING FOR HUMAN PAPILLOMAVIRUS (HPV): ICD-10-CM

## 2024-09-04 DIAGNOSIS — Z01.419 ROUTINE GYNECOLOGICAL EXAMINATION: Primary | ICD-10-CM

## 2024-09-04 PROCEDURE — G0476 HPV COMBO ASSAY CA SCREEN: HCPCS | Performed by: PHYSICIAN ASSISTANT

## 2024-09-04 PROCEDURE — S0612 ANNUAL GYNECOLOGICAL EXAMINA: HCPCS | Performed by: PHYSICIAN ASSISTANT

## 2024-09-04 PROCEDURE — G0145 SCR C/V CYTO,THINLAYER,RESCR: HCPCS | Performed by: PHYSICIAN ASSISTANT

## 2024-09-04 NOTE — PROGRESS NOTES
Assessment   44 y.o.  presenting for annual exam.     Plan   Diagnoses and all orders for this visit:    Routine gynecological examination  Normal findings on routine exam.  Encouraged 150 min of exercise per week.  Reviewed balanced diet.   Multivitamin encouraged   Breast awareness/SBE encouraged     Cervical polyp    Pea sized polyp of 7 oclock. Declines removal today. Pap collected. Reviewed sx to report     FADIA (stress urinary incontinence, female)  -     Ambulatory referral to Physical Therapy; Future    Breast pain, left  Intermittent pain of lateral left breast. Has gotten new bras and sleeps with bra on.   Discussed many possible causes.   Can consider removal of caffeine. Try evening primrose and Vit E.   NSAIDs for discomfort.   If persists in another 4-6 weeks, to call for repeat imaging.         Pap - done today due cervical polyp   Mammo - scheduled    Colonoscopy due 2030       RTO one year for yearly exam or sooner as needed.    __________________________________________________________________    Subjective     Maura Wong is a 44 y.o.  presenting for annual exam.     Dx in January with DM. A1c corrected from 6.8 to 5.1!  Has been in remission of binge eating disorder x 5 years, making this an even bigger accomplishment as she is finding dietary control with eating in moderation and w/o restricting (which is a trigger for her).     SCREENING  Last Pap: 2023 NILM/hpv neg   Last Mammo: 2023 BIRADS 1 - Negative   Last Colonoscopy: 2023 7 year recall     GYN    Periods are continue to be regular, lasting 5 days. Changes super tampon q 2.5 hrs on heaviest days, which last first 2-3 days.   Dysmenorrhea:mild, occurring first 1-2 days of flow.   Has some VM sx in the day or so leading up to menses - severity has improved with getting better control of A1c th is year.     Sexually active: with her   Concerns: denies pain, bleeding, dryness   Contraception: tubal  ligation    Hx Abnormal pap: LEEP in 2001 for reported high grade dysplasia   We reviewed ASCCP guidelines for Pap testing today.    Denies vaginal discharge, itching, odor, dyspareunia, pelvic pain and vulvar/vaginal symptoms    OB         Complaints: FADIA - open to PFPT  Denies urgency, frequency, hematuria, leakage / change in stream, difficulty urinating.       BREAST  Complaints: intermittent soreness of lateral left breast.  Recent breast MRI 2024 BIRADS 2 - Benign  Denies: breast lump, breast tenderness, nipple discharge, skin color change, and skin lesion(s)  Personal hx of neg genetic testing.     Pertinent Family Hx:   Family hx of breast cancer: mother, PGM, paternal aunt   Family hx of ovarian cancer: no  Family hx of colon cancer: no      GENERAL  PMH reviewed/updated and is as below.     Past Medical History:   Diagnosis Date    Allergic     Allergic rhinitis     Anxiety     Arthritis     Asthma     Complex ovarian cyst     last assessed: 2014    Condyloma acuminatum     Depression     Diabetes mellitus (HCC)     Dysmenorrhea     last assessed: 2013    Eating disorder 2020    in recovery BED    H/O human papillomavirus infection     Headache(784.0)     History of ankle surgery     Migraine     Palpitations     last assessed: 2013    TMJ dysfunction     Varicella May 1985       Past Surgical History:   Procedure Laterality Date    ANKLE FRACTURE SURGERY Left     BREAST BIOPSY Left     mri guided    benign    CERVICAL BIOPSY  W/ LOOP ELECTRODE EXCISION       SECTION      low transverse    COSMETIC SURGERY      Breast reduction    KNEE SURGERY      MRI BREAST BIOPSY LEFT (ALL INCLUSIVE) Left 2022    REDUCTION MAMMAPLASTY  2008    ROTATOR CUFF REPAIR  2014    TUBAL LIGATION           Current Outpatient Medications:     albuterol (Ventolin HFA) 90 mcg/act inhaler, Inhale 2 puffs every 6 (six) hours as needed for wheezing or shortness of  breath, Disp: 18 g, Rfl: 1    Continuous Blood Gluc  (FreeStyle Adriana 2 [place]) ALLA, Check blood sugars multiple times per day, Disp: 1 each, Rfl: 0    Continuous Blood Gluc Sensor (FreeStyle Adriana 2 Sensor) MISC, Check blood sugars multiple times per day, Disp: 6 each, Rfl: 3    escitalopram (LEXAPRO) 20 mg tablet, Take 1 tablet (20 mg total) by mouth daily, Disp: 90 tablet, Rfl: 3    hydrOXYzine HCL (ATARAX) 25 mg tablet, Take 1 tablet (25 mg total) by mouth daily at bedtime as needed for anxiety, Disp: 90 tablet, Rfl: 0    loratadine (CLARITIN) 10 mg tablet, Take 10 mg by mouth daily, Disp: , Rfl:     metFORMIN (GLUCOPHAGE-XR) 500 mg 24 hr tablet, 2 tabs with with dinner, Disp: 180 tablet, Rfl: 3    montelukast (SINGULAIR) 10 mg tablet, Take 1 tablet (10 mg total) by mouth daily at bedtime, Disp: 90 tablet, Rfl: 3    rosuvastatin (CRESTOR) 5 mg tablet, Take 1 tablet (5 mg total) by mouth daily, Disp: 90 tablet, Rfl: 3    Allergies   Allergen Reactions    Pollen Extract Allergic Rhinitis    Codeine      Other reaction(s): Unknown Reaction    Demerol [Meperidine] Rash    Other      Annotation - [id number]: mushroom       Social History     Socioeconomic History    Marital status: /Civil Union     Spouse name: Not on file    Number of children: Not on file    Years of education: Not on file    Highest education level: Not on file   Occupational History    Not on file   Tobacco Use    Smoking status: Former     Current packs/day: 0.00     Average packs/day: 0.5 packs/day for 10.0 years (5.0 ttl pk-yrs)     Types: Cigarettes     Start date: 2003     Quit date: 2013     Years since quittin.3    Smokeless tobacco: Never    Tobacco comments:     former smoker (as per allscripts)   Vaping Use    Vaping status: Some Days   Substance and Sexual Activity    Alcohol use: Yes     Comment: rarely    Drug use: Yes     Types: Marijuana     Comment: medical marijuana    Sexual activity: Yes      "Partners: Male     Birth control/protection: Surgical   Other Topics Concern    Not on file   Social History Narrative    Always uses seat belt    Denied: history of daily caffeinated coffee consumption    Exercises strenuously less than 3 times a week     Social Determinants of Health     Financial Resource Strain: Not on file   Food Insecurity: Not on file   Transportation Needs: Not on file   Physical Activity: Not on file   Stress: Not on file   Social Connections: Not on file   Intimate Partner Violence: Not on file   Housing Stability: Not on file       Review of Systems     Constitutional: Negative.   Respiratory: Negative.    Cardiovascular: Negative   Gastrointestinal: Negative   Breasts: As noted above.   Genitourinary: As noted above.   Psychiatric: Negative     Objective      /74 (BP Location: Right arm, Patient Position: Sitting, Cuff Size: Large)   Ht 5' 7\" (1.702 m)   LMP 08/27/2024 (Exact Date)   BMI 37.90 kg/m²     Physical Examination:    Patient appears well and is not in distress  Breasts are symmetrical without mass, tenderness, nipple discharge, skin changes or adenopathy.   Abdomen is soft and nontender without masses.   External genitals are normal without lesions or rashes.  Urethral meatus and urethra are normal  Bladder is normal to palpation  Vagina is normal without discharge or bleeding.   Cervix is normal without discharge or lesion. Pea sized polyp of 7 oclock. Declines removal today  Uterus is normal, mobile, nontender without palpable mass.  Adnexa are normal, nontender, without palpable mass.           "

## 2024-09-05 ENCOUNTER — OFFICE VISIT (OUTPATIENT)
Dept: FAMILY MEDICINE CLINIC | Facility: CLINIC | Age: 44
End: 2024-09-05
Payer: COMMERCIAL

## 2024-09-05 VITALS
HEIGHT: 67 IN | BODY MASS INDEX: 37.9 KG/M2 | SYSTOLIC BLOOD PRESSURE: 118 MMHG | DIASTOLIC BLOOD PRESSURE: 70 MMHG | OXYGEN SATURATION: 98 % | HEART RATE: 81 BPM

## 2024-09-05 DIAGNOSIS — Z00.00 ANNUAL PHYSICAL EXAM: Primary | ICD-10-CM

## 2024-09-05 PROCEDURE — 99396 PREV VISIT EST AGE 40-64: CPT | Performed by: FAMILY MEDICINE

## 2024-09-05 PROCEDURE — 3725F SCREEN DEPRESSION PERFORMED: CPT | Performed by: FAMILY MEDICINE

## 2024-09-05 NOTE — PROGRESS NOTES
Adult Annual Physical  Name: Maura Wong      : 1980      MRN: [id number]  Encounter Provider: Julianne Lr MD  Encounter Date: 2024   Encounter department: [facility]    Assessment & Plan   1. Annual physical exam    - Satisfactory physical examination   -Preventive care screenings were discussed with patient today. Patient was seen by GYN yesterday and had a pap smear   - Continue all medications unchanged   - Follow up in 6 months or sooner if needed     History of Present Illness   Maura Wong is a very pleasant 44 year old female who presents today for a physical. Overall she feels well and endorses no complaints at this time. She continues to follow with endocrinology and nutrition services for management of her diabetes which is well controlled. She also follows with Gastroenterology as well given her history of fatty liver. She reports that her mood is stable on her current medication as well and is not currently seeing a therapist at the moment.     Adult Annual Physical:  Patient presents for annual physical.     Diet and Physical Activity:  - Diet/Nutrition: well balanced diet.  - Exercise: walking.    Depression Screening:    - PHQ-9 Score: 3    General Health:  - Sleep: sleeps well.  - Vision: goes for regular eye exams.  - Dental: regular dental visits.    /GYN Health:  - Follows with GYN: yes.     Review of Systems   Constitutional: Negative.    HENT: Negative.     Eyes: Negative.    Respiratory: Negative.     Cardiovascular: Negative.    Gastrointestinal: Negative.    Genitourinary: Negative.    Musculoskeletal: Negative.    Skin: Negative.    Neurological: Negative.    Psychiatric/Behavioral: Negative.       Current Outpatient Medications on File Prior to Visit   Medication Sig Dispense Refill    albuterol (Ventolin HFA) 90 mcg/act inhaler Inhale 2 puffs every 6 (six) hours as needed for wheezing or shortness of breath 18 g 1    Continuous Blood Gluc   "(FreeStyle Adriana 2 [place]) ALLA Check blood sugars multiple times per day 1 each 0    Continuous Blood Gluc Sensor (FreeStyle Adriana 2 Sensor) MISC Check blood sugars multiple times per day 6 each 3    escitalopram (LEXAPRO) 20 mg tablet Take 1 tablet (20 mg total) by mouth daily 90 tablet 3    hydrOXYzine HCL (ATARAX) 25 mg tablet Take 1 tablet (25 mg total) by mouth daily at bedtime as needed for anxiety 90 tablet 0    loratadine (CLARITIN) 10 mg tablet Take 10 mg by mouth daily      metFORMIN (GLUCOPHAGE-XR) 500 mg 24 hr tablet 2 tabs with with dinner 180 tablet 3    montelukast (SINGULAIR) 10 mg tablet Take 1 tablet (10 mg total) by mouth daily at bedtime 90 tablet 3    rosuvastatin (CRESTOR) 5 mg tablet Take 1 tablet (5 mg total) by mouth daily 90 tablet 3     No current facility-administered medications on file prior to visit.      Social History     Tobacco Use    Smoking status: Former     Current packs/day: 0.00     Average packs/day: 0.5 packs/day for 10.0 years (5.0 ttl pk-yrs)     Types: Cigarettes     Start date: 2003     Quit date: 2013     Years since quittin.3    Smokeless tobacco: Never    Tobacco comments:     former smoker (as per allscripts)   Vaping Use    Vaping status: Some Days   Substance and Sexual Activity    Alcohol use: Yes     Comment: rarely    Drug use: Yes     Types: Marijuana     Comment: medical marijuana    Sexual activity: Yes     Partners: Male     Birth control/protection: Female Sterilization       Objective     /70 (BP Location: Right arm, Patient Position: Sitting, Cuff Size: Large)   Pulse 81   Ht 5' 7\" (1.702 m)   LMP 2024 (Exact Date)   SpO2 98%   BMI 37.90 kg/m²     Physical Exam  Constitutional:       General: She is not in acute distress.     Appearance: She is not ill-appearing.   HENT:      Head: Normocephalic and atraumatic.   Eyes:      General:         Right eye: No discharge.         Left eye: No discharge.      Extraocular " Movements: Extraocular movements intact.   Cardiovascular:      Rate and Rhythm: Normal rate.   Pulmonary:      Effort: Pulmonary effort is normal. No respiratory distress.      Breath sounds: No wheezing.   Abdominal:      General: Bowel sounds are normal. There is no distension.      Palpations: Abdomen is soft.      Tenderness: There is no abdominal tenderness. There is no guarding.   Musculoskeletal:      Right lower leg: No edema.      Left lower leg: No edema.   Neurological:      General: No focal deficit present.      Mental Status: She is alert.   Psychiatric:         Mood and Affect: Mood normal.         Behavior: Behavior normal.

## 2024-09-06 DIAGNOSIS — F41.9 ANXIETY: ICD-10-CM

## 2024-09-06 RX ORDER — HYDROXYZINE HYDROCHLORIDE 25 MG/1
TABLET, FILM COATED ORAL
Qty: 90 TABLET | Refills: 3 | Status: SHIPPED | OUTPATIENT
Start: 2024-09-06

## 2024-09-10 ENCOUNTER — TELEPHONE (OUTPATIENT)
Age: 44
End: 2024-09-10

## 2024-09-10 LAB
LAB AP GYN PRIMARY INTERPRETATION: NORMAL
Lab: NORMAL

## 2024-09-10 NOTE — TELEPHONE ENCOUNTER
Writer let patient know that  isnt taking any new patients at this time. Writer let patient know that we are referring out and that writer will email outside resources on Friday when writer returns to the office.

## 2024-09-14 DIAGNOSIS — Z13.39 ADHD (ATTENTION DEFICIT HYPERACTIVITY DISORDER) EVALUATION: Primary | ICD-10-CM

## 2024-10-01 ENCOUNTER — TELEPHONE (OUTPATIENT)
Dept: ADMINISTRATIVE | Facility: OTHER | Age: 44
End: 2024-10-01

## 2024-10-01 ENCOUNTER — OFFICE VISIT (OUTPATIENT)
Dept: ENDOCRINOLOGY | Facility: CLINIC | Age: 44
End: 2024-10-01
Payer: COMMERCIAL

## 2024-10-01 VITALS — HEART RATE: 68 BPM | DIASTOLIC BLOOD PRESSURE: 76 MMHG | SYSTOLIC BLOOD PRESSURE: 110 MMHG

## 2024-10-01 DIAGNOSIS — E11.9 TYPE 2 DIABETES MELLITUS WITHOUT COMPLICATION, WITHOUT LONG-TERM CURRENT USE OF INSULIN (HCC): Primary | ICD-10-CM

## 2024-10-01 DIAGNOSIS — E04.2 MULTIPLE THYROID NODULES: ICD-10-CM

## 2024-10-01 PROCEDURE — 95251 CONT GLUC MNTR ANALYSIS I&R: CPT

## 2024-10-01 PROCEDURE — 99214 OFFICE O/P EST MOD 30 MIN: CPT

## 2024-10-01 RX ORDER — MULTIVIT-MIN/IRON/FOLIC ACID/K 18-600-40
2000 CAPSULE ORAL DAILY
COMMUNITY

## 2024-10-01 RX ORDER — METFORMIN HCL 500 MG
500 TABLET, EXTENDED RELEASE 24 HR ORAL
Qty: 180 TABLET | Refills: 3 | Status: SHIPPED | OUTPATIENT
Start: 2024-10-01

## 2024-10-01 NOTE — ASSESSMENT & PLAN NOTE
Diabetes well controlled based off A1c and CGM report.   Treatment regimen:   - Decrease Metformin to 500 mg once daily  - Continue with CGM, this is helping her make better decisions and control diabetes  - Continue to focus on lifestyle modifications  Discussed risks/complications associated with uncontrolled diabetes.    Advised to adhere to diabetic diet, and recommended staying active/exercising routinely.  Keep carbohydrates consistent to limit blood glucose fluctuations.  Advised to call if blood sugars less than 70 mg/dl or over 300 mg/dl.   Discussed symptoms and treatment of hypoglycemia.    Recommended routine follow-up with podiatry and ophthalmology.   Ordered blood work to complete prior to next visit.   Lab Results   Component Value Date    HGBA1C 5.1 08/16/2024

## 2024-10-01 NOTE — LETTER
Diabetic Eye Exam Form  Second request    Date Requested: 10/10/24  Patient: Maura Wong  Patient : 1980   Referring Provider: RODRICK Moraes    [facility]      DIABETIC Eye Exam Date _______________________________      Type of Exam MUST be documented for Diabetic Eye Exams. Please CHECK ONE.     Retinal Exam       Dilated Retinal Exam       OCT       Optomap-Iris Exam      Fundus Photography       Left Eye - Please check Retinopathy or No Retinopathy        Exam did show retinopathy    Exam did not show retinopathy       Right Eye - Please check Retinopathy or No Retinopathy       Exam did show retinopathy    Exam did not show retinopathy       Comments __________________________________________________________    Practice Providing Exam ______________________________________________    Exam Performed By (print name) _______________________________________      Provider Signature ___________________________________________________      These reports are needed for  compliance.  Please fax this completed form and a copy of the Diabetic Eye Exam report to our office located at [address] [place] [address] as soon as possible via Fax [phone] attention Neo: Phone [phone]  We thank you for your assistance in treating our mutual patient.

## 2024-10-01 NOTE — TELEPHONE ENCOUNTER
Upon review of the In Basket request and the patient's chart, initial outreach has been made via fax to facility. Please see Contacts section for details.     Thank you  Neo Castellanos MA

## 2024-10-01 NOTE — PROGRESS NOTES
Established Patient Progress Note      Chief Complaint   Patient presents with    Diabetes Type 2        Impression & Plan:    Problem List Items Addressed This Visit          Endocrine    Type 2 diabetes mellitus without complication, without long-term current use of insulin ([facility]) - Primary     Diabetes well controlled based off A1c and CGM report.   Treatment regimen:   - Decrease Metformin to 500 mg once daily  - Continue with CGM, this is helping her make better decisions and control diabetes  - Continue to focus on lifestyle modifications  Discussed risks/complications associated with uncontrolled diabetes.    Advised to adhere to diabetic diet, and recommended staying active/exercising routinely.  Keep carbohydrates consistent to limit blood glucose fluctuations.  Advised to call if blood sugars less than 70 mg/dl or over 300 mg/dl.   Discussed symptoms and treatment of hypoglycemia.    Recommended routine follow-up with podiatry and ophthalmology.   Ordered blood work to complete prior to next visit.   Lab Results   Component Value Date    HGBA1C 5.1 08/16/2024            Relevant Medications    metFORMIN (GLUCOPHAGE-XR) 500 mg 24 hr tablet    Other Relevant Orders    Albumin / creatinine urine ratio    Hemoglobin A1C    Comprehensive metabolic panel    Lipid panel    Multiple thyroid nodules     Thyroid US stable - no need for repeat thyroid US.  Thyroid function normal.             History of Present Illness:   Maura Wong is a 44 y.o. female with type 2 diabetes seen in follow up. Reports complications of none. Denies recent severe hypoglycemic or severe hyperglycemic episodes. Denies any issues with her current regimen. Last A1C was 5.1. Home glucose monitoring: are performed regularly with CGM.     Diagnosed with diabetes in February 2024.   Switched to Metforin XR due to diarrhea.     Maura Wong   Device used: Socii 2   Home use   Indication: Type 2 Diabetes  More than 72 hours of data was  reviewed. Report to be scanned to chart.   Date Range: 24-10/1/24  Analysis of data:   Average Glucose: 103  Coefficient of Variation: 14.9%    Time in Target Range: 99%   Time Above Range: 0%   Time Below Range: 1%   Interpretation of data:  BGs very well controlled, occ. Hypoglycemia.      Current regimen:   Metformin  mg BID    Last Eye Exam: UTD  Last Foot Exam: UTD    Has hyperlipidemia: Taking rosuvastatin       Patient Active Problem List   Diagnosis    Allergic rhinitis    Asthma    Herpes simplex infection    Thoracic myofascial strain    Hemangioma of liver    Major depressive disorder, recurrent, moderate (HCC)    Arthritis    BMI 30.0-30.9,adult    Enlarged thyroid gland    Posterior tibial tendon dysfunction (PTTD) of left lower extremity    Binge eating disorder    Anxiety    Increased risk of breast cancer    Type 2 diabetes mellitus without complication, without long-term current use of insulin (HCC)    Family history of breast cancer    Vitamin D deficiency    Multiple thyroid nodules      Past Medical History:   Diagnosis Date    Allergic     Allergic rhinitis     Anxiety     Arthritis     Asthma     Complex ovarian cyst     last assessed: 2014    Condyloma acuminatum     Depression     Diabetes mellitus (HCC)     Dysmenorrhea     last assessed: 2013    Eating disorder 2020    in recovery BED    H/O human papillomavirus infection     Headache(784.0)     History of ankle surgery     Migraine     Palpitations     last assessed: 2013    TMJ dysfunction     Varicella May 1985      Past Surgical History:   Procedure Laterality Date    ANKLE FRACTURE SURGERY Left     BREAST BIOPSY Left     mri guided    benign    CERVICAL BIOPSY  W/ LOOP ELECTRODE EXCISION       SECTION      low transverse    COSMETIC SURGERY      Breast reduction    KNEE SURGERY      MRI BREAST BIOPSY LEFT (ALL INCLUSIVE) Left 2022    REDUCTION MAMMAPLASTY      ROTATOR  CUFF REPAIR  2014    TUBAL LIGATION        Social History     Tobacco Use    Smoking status: Former     Current packs/day: 0.00     Average packs/day: 0.5 packs/day for 10.0 years (5.0 ttl pk-yrs)     Types: Cigarettes     Start date: 2003     Quit date: 2013     Years since quittin.3    Smokeless tobacco: Never    Tobacco comments:     former smoker (as per allscripts)   Substance Use Topics    Alcohol use: Yes     Comment: rarely     Allergies   Allergen Reactions    Pollen Extract Allergic Rhinitis    Codeine      Other reaction(s): Unknown Reaction    Demerol [Meperidine] Rash    Other      Annotation - [id number]: mushroom         Current Outpatient Medications:     albuterol (Ventolin HFA) 90 mcg/act inhaler, Inhale 2 puffs every 6 (six) hours as needed for wheezing or shortness of breath, Disp: 18 g, Rfl: 1    Cholecalciferol (Vitamin D) 50 MCG (2000 UT) CAPS, Take 2,000 Units by mouth daily, Disp: , Rfl:     Continuous Blood Gluc  (FreeStyle Adriana 2 [place]) ALLA, Check blood sugars multiple times per day, Disp: 1 each, Rfl: 0    Continuous Blood Gluc Sensor (FreeStyle Adriana 2 Sensor) MISC, Check blood sugars multiple times per day, Disp: 6 each, Rfl: 3    escitalopram (LEXAPRO) 20 mg tablet, Take 1 tablet (20 mg total) by mouth daily, Disp: 90 tablet, Rfl: 3    hydrOXYzine HCL (ATARAX) 25 mg tablet, TAKE 1 TABLET DAILY AT BEDTIME AS NEEDED FOR ANXIETY, Disp: 90 tablet, Rfl: 3    loratadine (CLARITIN) 10 mg tablet, Take 10 mg by mouth daily, Disp: , Rfl:     metFORMIN (GLUCOPHAGE-XR) 500 mg 24 hr tablet, Take 1 tablet (500 mg total) by mouth daily with breakfast, Disp: 180 tablet, Rfl: 3    montelukast (SINGULAIR) 10 mg tablet, Take 1 tablet (10 mg total) by mouth daily at bedtime, Disp: 90 tablet, Rfl: 3    rosuvastatin (CRESTOR) 5 mg tablet, Take 1 tablet (5 mg total) by mouth daily, Disp: 90 tablet, Rfl: 3    Review of Systems   Constitutional:  Negative for chills, fever and  unexpected weight change.   HENT:  Negative for sore throat, trouble swallowing and voice change.    Eyes:  Negative for visual disturbance.   Cardiovascular:  Negative for chest pain.   Gastrointestinal:  Positive for diarrhea. Negative for abdominal pain, nausea and vomiting.   Endocrine: Negative for polydipsia and polyuria.   Skin:  Negative for wound.   Neurological:  Negative for syncope.   All other systems reviewed and are negative.      Physical Exam:  There is no height or weight on file to calculate BMI.  /76   Pulse 68   LMP 08/27/2024 (Exact Date)    Wt Readings from Last 3 Encounters:   02/02/24 110 kg (242 lb)   01/28/24 110 kg (242 lb 8.1 oz)   08/25/23 109 kg (240 lb 4.8 oz)       Physical Exam  Vitals reviewed.   Constitutional:       Appearance: Normal appearance. She is obese.   Cardiovascular:      Rate and Rhythm: Normal rate.   Pulmonary:      Effort: Pulmonary effort is normal.   Neurological:      Mental Status: She is alert and oriented to person, place, and time.   Psychiatric:         Mood and Affect: Mood normal.         Thought Content: Thought content normal.       Labs:   Lab Results   Component Value Date    HGBA1C 5.1 08/16/2024    HGBA1C 5.1 05/14/2024    HGBA1C 6.8 (H) 02/03/2024     Lab Results   Component Value Date    CREATININE 0.81 08/16/2024    CREATININE 0.78 07/18/2024    CREATININE 0.81 02/03/2024    BUN 12 08/16/2024     12/28/2016    K 4.0 08/16/2024     08/16/2024    CO2 24 08/16/2024     GFR, Calculated   Date Value Ref Range Status   03/29/2018 102 >60 mL/min/1.73m2 Final     Comment:     mL/min per 1.73 square meters                                            Normal Function or Mild Renal    Disease (if clinically at risk):  >or=60  Moderately Decreased:                30-59  Severely Decreased:                  15-29  Renal Failure:                         <15                                            -American GFR: multiply reported GFR  by 1.16    Please note that the eGFR is based on the CKD-EPI calculation, and is not intended to be used for drug dosing.     eGFRcr   Date Value Ref Range Status   04/17/2022 88 >59 Final     eGFR   Date Value Ref Range Status   08/16/2024 87 ml/min/1.73sq m Final     Lab Results   Component Value Date    CHOL 161 12/28/2016    HDL 35 (L) 02/03/2024    TRIG 179 (H) 02/03/2024     Lab Results   Component Value Date    ALT 14 08/16/2024    AST 13 08/16/2024    ALKPHOS 53 08/16/2024    BILITOT 0.3 12/28/2016     Lab Results   Component Value Date    NOJ7PQHAXGDZ 1.415 08/16/2024    MWH9NVKQKMOF 0.695 11/26/2021    [id number] 1.160 11/06/2020       There are no Patient Instructions on file for this visit.    Discussed with the patient and all questioned fully answered. She will call me if any problems arise.    RODRICK Stewart

## 2024-10-01 NOTE — TELEPHONE ENCOUNTER
----- Message from Ramya TUCKER sent at 10/1/2024  8:18 AM EDT -----  Regarding: DM EYE EXAM  10/01/24 8:18 AM    Hello, our patient attached above has had a DM Eye Exam performed at [facility] in [place]. Their number is [phone].    Thank you,  Ramya Rodney MA  [facility]

## 2024-10-01 NOTE — LETTER
Diabetic Eye Exam Form    Date Requested: 10/01/24  Patient: Maura Wong  Patient : 1980   Referring Provider: RODRICK Moraes    [facility]      DIABETIC Eye Exam Date _______________________________      Type of Exam MUST be documented for Diabetic Eye Exams. Please CHECK ONE.     Retinal Exam       Dilated Retinal Exam       OCT       Optomap-Iris Exam      Fundus Photography       Left Eye - Please check Retinopathy or No Retinopathy        Exam did show retinopathy    Exam did not show retinopathy       Right Eye - Please check Retinopathy or No Retinopathy       Exam did show retinopathy    Exam did not show retinopathy       Comments __________________________________________________________    Practice Providing Exam ______________________________________________    Exam Performed By (print name) _______________________________________      Provider Signature ___________________________________________________      These reports are needed for  compliance.  Please fax this completed form and a copy of the Diabetic Eye Exam report to our office located at [address] [place] [address] as soon as possible via Fax [phone] attention Neo: Phone [phone]  We thank you for your assistance in treating our mutual patient.

## 2024-10-10 NOTE — TELEPHONE ENCOUNTER
As a follow-up, a second attempt has been made for outreach via fax to facility. Please see Contacts section for details.    Thank you  Neo Castellanos MA

## 2024-10-15 NOTE — TELEPHONE ENCOUNTER
As a final attempt, a third outreach has been made via telephone call to facility. Please see Contacts section for details. This encounter will be closed and completed by end of day. Should we receive the requested information because of previous outreach attempts, the requested patient's chart will be updated appropriately.     Thank you  Neo Castellanos MA

## 2024-10-25 ENCOUNTER — OFFICE VISIT (OUTPATIENT)
Dept: GASTROENTEROLOGY | Facility: MEDICAL CENTER | Age: 44
End: 2024-10-25
Payer: COMMERCIAL

## 2024-10-25 VITALS
HEIGHT: 67 IN | BODY MASS INDEX: 37.9 KG/M2 | HEART RATE: 71 BPM | TEMPERATURE: 97.8 F | DIASTOLIC BLOOD PRESSURE: 66 MMHG | SYSTOLIC BLOOD PRESSURE: 99 MMHG | OXYGEN SATURATION: 97 %

## 2024-10-25 DIAGNOSIS — K75.81 METABOLIC DYSFUNCTION-ASSOCIATED STEATOHEPATITIS (MASH): Primary | ICD-10-CM

## 2024-10-25 DIAGNOSIS — K57.90 DIVERTICULOSIS: ICD-10-CM

## 2024-10-25 PROCEDURE — 99214 OFFICE O/P EST MOD 30 MIN: CPT | Performed by: NURSE PRACTITIONER

## 2024-10-25 NOTE — PROGRESS NOTES
Ambulatory Visit  Name: Maura Wong      : 1980      MRN: [id number]  Encounter Provider: RODRICK Gallardo  Encounter Date: 10/25/2024   Encounter department: [facility]    Assessment & Plan  Metabolic dysfunction-associated steatohepatitis (MASH)  Patient is on metformin but recently reduced due to hemoglobin A1c being within normal range.  She is currently losing weight.  She does not weigh herself regularly due to her history of eating disorder but noted that she has lost approximately 30 pounds since she was diagnosed with diabetes.  Recently started on Crestor.  Feeling well overall.  Recent LFTs are normal.  Patient would like to hold on Rezdiffea at this time due to her currently losing weight and hemoglobin A1c normal.  Will continue to monitor labs every 6 months.    Orders:    Hepatic function panel; Future  -Follow-up in 6 months  -Continue glycemic control and weight loss   Diverticulosis  Did have a second bout of diverticulitis this year  after eating nuts.  Symptoms completely resolved after Augmentin treatment.    -High-fiber diet  -Consider Rezdiffra at next visit           History of Present Illness     Maura Wong is a 44 y.o. female who presents for follow-up.  She was last seen by myself 2024 for MASH, liver hemangioma and elevated LESLIE.    Recent ultrasound elastography noted S3, F2.  Recent AST 62, ALT 57, alk phos 54, total bili 0.43.  Liver serology workup negative other than LESLIE 640.  Anti-LK M and immunoglobulins were negative/normal.  We did have a long discussion about VERMA disease process potential complications and treatment.  Patient was recently diagnosed with diabetes.  She currently has been losing weight.  She has seen an endocrinologist.  We did discuss Rezdiffra further treatment of VERMA reduce fibrosis and improve steatosis.     Interval history: Patient is on metformin but recently reduced due to  hemoglobin A1c being within normal range.  She is currently losing weight.  She does not weigh herself regularly due to her history of eating disorder but noted that she has lost approximately 30 pounds since she was diagnosed with diabetes.  Recently started on Crestor.  Feeling well overall.  Recent LFTs are normal.    History of diverticulosis.  She had 2 bouts of diverticulitis this past year.  The most recent bout was after eating nuts.  Symptoms resolved with a course of Augmentin.  BMs are brown and formed daily.  Denies any melena or hematochezia.    Interval history: Labs 8/24 CMP normal other than glucose 100, vitamin D 28.4, hemoglobin A1c 5.1, TSH 1.4    Ultrasound elastography 2/24-S3, F2.     Prior EGD/colonoscopy      Colonoscopy 8/25/2023-2 subcentimeter polyps and diverticulosis.  Repeat colonoscopy recommended in 7 years.  Biopsies revealed 1 tubular adenoma and 1 hyperplastic polyp.     History obtained from : patient  Review of Systems   All other systems reviewed and are negative.    Medical History Reviewed by provider this encounter:  Tobacco  Allergies  Meds  Problems  Med Hx  Surg Hx  Fam Hx       Current Outpatient Medications on File Prior to Visit   Medication Sig Dispense Refill    albuterol (Ventolin HFA) 90 mcg/act inhaler Inhale 2 puffs every 6 (six) hours as needed for wheezing or shortness of breath 18 g 1    Cholecalciferol (Vitamin D) 50 MCG (2000 UT) CAPS Take 2,000 Units by mouth daily      Continuous Blood Gluc  (FreeStyle Adriana 2 [place]) ALLA Check blood sugars multiple times per day 1 each 0    Continuous Blood Gluc Sensor (FreeStyle Adriana 2 Sensor) MISC Check blood sugars multiple times per day 6 each 3    escitalopram (LEXAPRO) 20 mg tablet Take 1 tablet (20 mg total) by mouth daily 90 tablet 3    hydrOXYzine HCL (ATARAX) 25 mg tablet TAKE 1 TABLET DAILY AT BEDTIME AS NEEDED FOR ANXIETY 90 tablet 3    loratadine (CLARITIN) 10 mg tablet Take 10 mg by mouth  "daily      metFORMIN (GLUCOPHAGE-XR) 500 mg 24 hr tablet Take 1 tablet (500 mg total) by mouth daily with breakfast 180 tablet 3    montelukast (SINGULAIR) 10 mg tablet Take 1 tablet (10 mg total) by mouth daily at bedtime 90 tablet 3    rosuvastatin (CRESTOR) 5 mg tablet Take 1 tablet (5 mg total) by mouth daily 90 tablet 3     No current facility-administered medications on file prior to visit.      Social History     Tobacco Use    Smoking status: Former     Current packs/day: 0.00     Average packs/day: 0.5 packs/day for 10.0 years (5.0 ttl pk-yrs)     Types: Cigarettes     Start date: 2003     Quit date: 2013     Years since quittin.4    Smokeless tobacco: Never    Tobacco comments:     former smoker (as per allscripts)   Vaping Use    Vaping status: Some Days   Substance and Sexual Activity    Alcohol use: Yes     Comment: rarely    Drug use: Yes     Types: Marijuana     Comment: medical marijuana    Sexual activity: Yes     Partners: Male     Birth control/protection: Female Sterilization         Objective     BP 99/66 (BP Location: Left arm)   Pulse 71   Temp 97.8 °F (36.6 °C)   Ht 5' 7\" (1.702 m)   SpO2 97%   BMI 37.90 kg/m²     Physical Exam  Abdominal:      General: Abdomen is flat. Bowel sounds are normal.      Palpations: Abdomen is soft.         "

## 2024-10-31 DIAGNOSIS — E11.9 TYPE 2 DIABETES MELLITUS WITHOUT COMPLICATION, WITHOUT LONG-TERM CURRENT USE OF INSULIN (HCC): ICD-10-CM

## 2024-10-31 DIAGNOSIS — J45.40 MODERATE PERSISTENT ASTHMA WITHOUT COMPLICATION: ICD-10-CM

## 2024-10-31 DIAGNOSIS — F33.1 MODERATE EPISODE OF RECURRENT MAJOR DEPRESSIVE DISORDER (HCC): ICD-10-CM

## 2024-10-31 RX ORDER — ROSUVASTATIN CALCIUM 5 MG/1
5 TABLET, COATED ORAL DAILY
Qty: 90 TABLET | Refills: 3 | Status: SHIPPED | OUTPATIENT
Start: 2024-10-31

## 2024-10-31 RX ORDER — ESCITALOPRAM OXALATE 20 MG/1
20 TABLET ORAL DAILY
Qty: 90 TABLET | Refills: 3 | Status: SHIPPED | OUTPATIENT
Start: 2024-10-31

## 2024-10-31 RX ORDER — MONTELUKAST SODIUM 10 MG/1
10 TABLET ORAL
Qty: 90 TABLET | Refills: 3 | Status: SHIPPED | OUTPATIENT
Start: 2024-10-31

## 2024-11-14 DIAGNOSIS — J45.40 MODERATE PERSISTENT ASTHMA WITHOUT COMPLICATION: ICD-10-CM

## 2024-11-14 DIAGNOSIS — F33.1 MODERATE EPISODE OF RECURRENT MAJOR DEPRESSIVE DISORDER (HCC): ICD-10-CM

## 2024-11-14 DIAGNOSIS — E11.9 TYPE 2 DIABETES MELLITUS WITHOUT COMPLICATION, WITHOUT LONG-TERM CURRENT USE OF INSULIN (HCC): ICD-10-CM

## 2024-11-14 RX ORDER — ESCITALOPRAM OXALATE 20 MG/1
20 TABLET ORAL DAILY
Qty: 90 TABLET | Refills: 1 | Status: SHIPPED | OUTPATIENT
Start: 2024-11-14

## 2024-11-14 RX ORDER — METFORMIN HYDROCHLORIDE 500 MG/1
500 TABLET, EXTENDED RELEASE ORAL
Qty: 180 TABLET | Refills: 1 | Status: SHIPPED | OUTPATIENT
Start: 2024-11-14

## 2024-11-14 RX ORDER — ROSUVASTATIN CALCIUM 5 MG/1
5 TABLET, COATED ORAL DAILY
Qty: 90 TABLET | Refills: 1 | Status: SHIPPED | OUTPATIENT
Start: 2024-11-14

## 2024-11-14 RX ORDER — METFORMIN HYDROCHLORIDE 500 MG/1
500 TABLET, EXTENDED RELEASE ORAL
Qty: 180 TABLET | Refills: 0 | Status: CANCELLED | OUTPATIENT
Start: 2024-11-14

## 2024-11-14 RX ORDER — MONTELUKAST SODIUM 10 MG/1
10 TABLET ORAL
Qty: 90 TABLET | Refills: 1 | Status: SHIPPED | OUTPATIENT
Start: 2024-11-14

## 2024-11-14 NOTE — TELEPHONE ENCOUNTER
Reason for call:   [x] Refill   [] Prior Auth  [x] Other: not a duplicate, new pharmacy     Office:   [x] PCP/Provider - Dr Lr  [] Specialty/Provider -     Medication: freestyle zoya 2 sensors     Escitalopram 20 mg take daily     Montelukast 10 mg take daily     Rosuvastatin 5 mg take daily       Quantity: 90 day for all    Pharmacy: [facility]    Does the patient have enough for 3 days?   [x] Yes   [] No - Send as HP to POD

## 2024-11-14 NOTE — TELEPHONE ENCOUNTER
Reason for call:   [x] Refill   [] Prior Auth  [] Other: not a duplicate, new pharmacy    Office:   [] PCP/Provider -   [x] Specialty/Provider - endo    Medication: metformin XR    Dose/Frequency: 500 mg take daily with breakfast     Quantity: 90 day    Pharmacy: [facility]    Does the patient have enough for 3 days?   [x] Yes   [] No - Send as HP to POD

## 2024-11-20 ENCOUNTER — TELEPHONE (OUTPATIENT)
Dept: ENDOCRINOLOGY | Facility: CLINIC | Age: 44
End: 2024-11-20

## 2024-11-21 NOTE — TELEPHONE ENCOUNTER
Hello. It looks like you had ordered the Adriana for this patient. Our office is unable to do the prior auth since it was ordered by you.

## 2024-12-09 ENCOUNTER — OFFICE VISIT (OUTPATIENT)
Dept: SURGICAL ONCOLOGY | Facility: CLINIC | Age: 44
End: 2024-12-09
Payer: COMMERCIAL

## 2024-12-09 VITALS
HEART RATE: 75 BPM | OXYGEN SATURATION: 97 % | DIASTOLIC BLOOD PRESSURE: 80 MMHG | TEMPERATURE: 97.1 F | HEIGHT: 67 IN | SYSTOLIC BLOOD PRESSURE: 118 MMHG | BODY MASS INDEX: 37.9 KG/M2

## 2024-12-09 DIAGNOSIS — Z91.89 INCREASED RISK OF BREAST CANCER: ICD-10-CM

## 2024-12-09 DIAGNOSIS — Z80.3 FAMILY HISTORY OF BREAST CANCER: Primary | ICD-10-CM

## 2024-12-09 PROCEDURE — 99213 OFFICE O/P EST LOW 20 MIN: CPT | Performed by: NURSE PRACTITIONER

## 2024-12-09 NOTE — PROGRESS NOTES
Surgical Oncology Benign Breast       [address]   [facility] [place]  [address]  [facility] [address]    Maura Wong  1980  [id number]      Chief Complaint   Patient presents with    Follow-up       Assessment/Plan:  1. Family history of breast cancer (Primary)    - MRI breast bilateral w and wo contrast w cad; Future  - BUN; Future  - Creatinine, serum; Future    2. Increased risk of breast cancer  - 1 year f/u visit  - MRI breast bilateral w and wo contrast w cad; Future  - BUN; Future  - Creatinine, serum; Future      Discussion/Summary:  Patient is a 44-year-old female that has a family history of breast cancer and an increased risk of breast cancer.  I have calculated her lifetime TC risk to be 21%.  I reviewed risk reducing measures with her.  I recommended annual 3D mammography, annual breast MRI and clinical breast exams every 6 months. She had a bilateral mammomgram in May of 2024 which was BIRADS 1, category 1 density. She is scheduled for a mammogram next month. She had a bilateral breast MRI in July of 2024 which was BIRADS 2.  She offers no new complaints today and there are no worrisome findings on today's clinical exam.  I will make arrangements for her breast MRI due next summer and I will see her back in 1 year.  She will receive a clinical breast exam with her gynecologist in about 6 months.  She was instructed to contact us with any changes or concerns in the interim.  All questions were answered today.    History of Present Illness:     Test(s): [facility] CustomNext Cancer Panel +RNA (47 genes): APC, TAMIE, AXIN2, BARD1, BMPR1A, BRCA1, BRCA2, BRIP1, CDH1, CDK4, CDKN2A, CHEK2, CTNNA1, DICER1, EPCAM, GREM1, HOXB13, KIT, MEN1, MLH1, MSH2, MSH3, MSH6, MUTYH, NBN, NF1, NTHL1, PALB2, PDGFRA, PMS2, POLD1, POLE, PTEN, RAD50, RAD51C, RAD51D, SDHA, SDHB, SDHC, SDHD, SMAD4, SMARCA4, STK11, TP53, TSC1, TSC2, VHL     Result: Negative - No Clinically  Significant Variants Detected           -Interval History: Patient presents today for follow-up visit.  She has not appreciated any changes on self-exam.  She had a bilateral breast MRI in July which was BI-RADS 2.  Reports no changes in family history.    Review of Systems:  Review of Systems   Constitutional:  Negative for chills, fatigue and fever.   Respiratory:  Negative for cough and shortness of breath.    Cardiovascular:  Negative for chest pain.   Hematological:  Negative for adenopathy.   Psychiatric/Behavioral:  Negative for confusion.        Patient Active Problem List   Diagnosis    Allergic rhinitis    Asthma    Herpes simplex infection    Thoracic myofascial strain    Hemangioma of liver    Major depressive disorder, recurrent, moderate (HCC)    Arthritis    BMI 30.0-30.9,adult    Enlarged thyroid gland    Posterior tibial tendon dysfunction (PTTD) of left lower extremity    Binge eating disorder    Anxiety    Increased risk of breast cancer    Type 2 diabetes mellitus without complication, without long-term current use of insulin (HCC)    Family history of breast cancer    Vitamin D deficiency    Multiple thyroid nodules     Past Medical History:   Diagnosis Date    Allergic     Allergic rhinitis     Anxiety     Arthritis 2016    Asthma     Complex ovarian cyst     last assessed: 2014    Condyloma acuminatum     Depression     Diabetes mellitus (HCC)     Dysmenorrhea     last assessed: 2013    Eating disorder 2020    in recovery BED    H/O human papillomavirus infection     Headache(784.0)     History of ankle surgery     Migraine     Palpitations     last assessed: 2013    TMJ dysfunction     Varicella May 1985     Past Surgical History:   Procedure Laterality Date    ANKLE FRACTURE SURGERY Left     BREAST BIOPSY Left     mri guided    benign    CERVICAL BIOPSY  W/ LOOP ELECTRODE EXCISION       SECTION      low transverse    COSMETIC SURGERY      Breast  reduction    KNEE SURGERY      MRI BREAST BIOPSY LEFT (ALL INCLUSIVE) Left 2022    REDUCTION MAMMAPLASTY      ROTATOR CUFF REPAIR  2014    TUBAL LIGATION       Family History   Problem Relation Age of Onset    Thyroid disease unspecified Mother     Breast cancer Mother 43        diagnosed at 43    Cancer Mother         Breast Cancer    Cancer Father         Amyloidosis    Asthma Father     Breast cancer Paternal Aunt 52    No Known Problems Paternal Aunt     Cancer Maternal Grandfather         ureter/Kidney    Breast cancer Paternal Grandmother 42        diagnosed, death early 40s    Cancer Paternal Grandmother         Breast cancer    No Known Problems Daughter      Social History     Socioeconomic History    Marital status: /Civil Union     Spouse name: Not on file    Number of children: Not on file    Years of education: Not on file    Highest education level: Not on file   Occupational History    Not on file   Tobacco Use    Smoking status: Former     Current packs/day: 0.00     Average packs/day: 0.5 packs/day for 10.0 years (5.0 ttl pk-yrs)     Types: Cigarettes     Start date: 2003     Quit date: 2013     Years since quittin.5    Smokeless tobacco: Never    Tobacco comments:     former smoker (as per allscripts)   Vaping Use    Vaping status: Some Days   Substance and Sexual Activity    Alcohol use: Yes     Comment: rarely    Drug use: Yes     Types: Marijuana     Comment: medical marijuana    Sexual activity: Yes     Partners: Male     Birth control/protection: Female Sterilization   Other Topics Concern    Not on file   Social History Narrative    Always uses seat belt    Denied: history of daily caffeinated coffee consumption    Exercises strenuously less than 3 times a week     Social Drivers of Health     Financial Resource Strain: Not on file   Food Insecurity: Not on file   Transportation Needs: Not on file   Physical Activity: Not on file   Stress: Not on file    Social Connections: Not on file   Intimate Partner Violence: Not on file   Housing Stability: Not on file       Current Outpatient Medications:     albuterol (Ventolin HFA) 90 mcg/act inhaler, Inhale 2 puffs every 6 (six) hours as needed for wheezing or shortness of breath, Disp: 18 g, Rfl: 1    Cholecalciferol (Vitamin D) 50 MCG (2000 UT) CAPS, Take 2,000 Units by mouth daily, Disp: , Rfl:     Continuous Blood Gluc  (FreeStyle Adriana 2 [place]) ALLA, Check blood sugars multiple times per day, Disp: 1 each, Rfl: 0    Continuous Glucose Sensor (FreeStyle Adriana 2 Sensor) MISC, Check blood sugars multiple times per day, Disp: 6 each, Rfl: 1    escitalopram (LEXAPRO) 20 mg tablet, Take 1 tablet (20 mg total) by mouth daily, Disp: 90 tablet, Rfl: 1    hydrOXYzine HCL (ATARAX) 25 mg tablet, TAKE 1 TABLET DAILY AT BEDTIME AS NEEDED FOR ANXIETY, Disp: 90 tablet, Rfl: 3    loratadine (CLARITIN) 10 mg tablet, Take 10 mg by mouth daily, Disp: , Rfl:     metFORMIN (GLUCOPHAGE-XR) 500 mg 24 hr tablet, Take 1 tablet (500 mg total) by mouth daily with breakfast, Disp: 180 tablet, Rfl: 1    montelukast (SINGULAIR) 10 mg tablet, Take 1 tablet (10 mg total) by mouth daily at bedtime, Disp: 90 tablet, Rfl: 1    rosuvastatin (CRESTOR) 5 mg tablet, Take 1 tablet (5 mg total) by mouth daily, Disp: 90 tablet, Rfl: 1  Allergies   Allergen Reactions    Pollen Extract Allergic Rhinitis    Codeine      Other reaction(s): Unknown Reaction    Demerol [Meperidine] Rash    Other      Annotation - [id number]: mushroom     Vitals:    12/09/24 0924   BP: 118/80   Pulse: 75   Temp: (!) 97.1 °F (36.2 °C)   SpO2: 97%       Physical Exam  Vitals reviewed.   Constitutional:       Appearance: Normal appearance.   HENT:      Head: Normocephalic and atraumatic.   Pulmonary:      Effort: Pulmonary effort is normal.   Chest:   Breasts:     Right: Skin change (reduction scars) present. No swelling, bleeding, inverted nipple, mass, nipple discharge or  tenderness.      Left: Skin change (reduction scars) and tenderness present. No swelling, bleeding, inverted nipple, mass or nipple discharge.   Lymphadenopathy:      Upper Body:      Right upper body: No supraclavicular or axillary adenopathy.      Left upper body: No supraclavicular or axillary adenopathy.   Skin:     General: Skin is warm and dry.   Neurological:      General: No focal deficit present.      Mental Status: She is alert and oriented to person, place, and time.   Psychiatric:         Mood and Affect: Mood normal.         Advance Care Planning/Advance Directives:  Discussed disease status and treatment goals with the patient.

## 2024-12-27 NOTE — TELEPHONE ENCOUNTER
Pt says she has a hx of ovarian cysts - has had one rupture already in past - but must have been more than 4 yrs ago  Pt has r sided pain for over 2 days  More pressure than pain   Natividad Putty it is a constant 5 but upon sudden movement , can be 10  No bowel or urinary sx  Still has appendix  Discussed ER - pt said not that bad yet  Is taking advil  May I order pelvic u/s? Do you want ov after?  Thx Routine safety standards initiated.  Routine nursing standards initiated.

## 2025-02-11 DIAGNOSIS — J40 BRONCHITIS: ICD-10-CM

## 2025-02-11 NOTE — TELEPHONE ENCOUNTER
Medication:     albuterol (Ventolin HFA) 90 mcg/act inhaler       Dose/Frequency: Inhale 2 puffs every 6 (six) hours as needed for wheezing or shortness of breath     Quantity: 18g    Pharmacy: [facility] [place] [place]  [address] [facility]     Office:   [] PCP/Provider -   [] Speciality/Provider -     Does the patient have enough for 3 days?   [] Yes   [] No - Send as HP to POD

## 2025-02-12 RX ORDER — ALBUTEROL SULFATE 90 UG/1
2 INHALANT RESPIRATORY (INHALATION) EVERY 6 HOURS PRN
Qty: 18 G | Refills: 1 | Status: SHIPPED | OUTPATIENT
Start: 2025-02-12 | End: 2025-02-13 | Stop reason: SDUPTHER

## 2025-02-13 DIAGNOSIS — J40 BRONCHITIS: ICD-10-CM

## 2025-02-13 RX ORDER — ALBUTEROL SULFATE 90 UG/1
2 INHALANT RESPIRATORY (INHALATION) EVERY 6 HOURS PRN
Qty: 54 G | Refills: 0 | Status: SHIPPED | OUTPATIENT
Start: 2025-02-13

## 2025-02-13 NOTE — TELEPHONE ENCOUNTER
Not a duplicate  Pharmacy called to request a 90 day supply for pt.    Reason for call:   [x] Refill   [] Prior Auth  [] Other:     Office:   [x] PCP/Provider - Julianne Lr MD   [] Specialty/Provider -     Medication: albuterol (Ventolin HFA) 90 mcg/act inhaler     Dose/Frequency: Inhale 2 puffs every 6 (six) hours as needed for wheezing or shortness of breath     Quantity: 54 g    Pharmacy: [facility] - [place], [place]  [address] [facility].    Does the patient have enough for 3 days?   [x] Yes   [] No - Send as HP to POD

## 2025-02-17 ENCOUNTER — OFFICE VISIT (OUTPATIENT)
Dept: OBGYN CLINIC | Facility: CLINIC | Age: 45
End: 2025-02-17
Payer: COMMERCIAL

## 2025-02-17 VITALS — SYSTOLIC BLOOD PRESSURE: 94 MMHG | DIASTOLIC BLOOD PRESSURE: 62 MMHG

## 2025-02-17 DIAGNOSIS — D23.9 ANGIOKERATOMA: Primary | ICD-10-CM

## 2025-02-17 PROCEDURE — 99213 OFFICE O/P EST LOW 20 MIN: CPT | Performed by: PHYSICIAN ASSISTANT

## 2025-02-17 NOTE — PROGRESS NOTES
Name: Maura Wong      : 1980      MRN: [id number]  Encounter Provider: Shanell Garcia PA-C  Encounter Date: 2025   Encounter department: [facility] [place]  :  Assessment & Plan  Angiokeratoma  Reviewed exam findings consistent with angiokeratoma. Discussed potential for this to be isolated or for more to appear. Suspect inflamed hair follicle as cause for pain. Monitoring advised. Biopsy if enlarging or changing. Reviewed ABCDE's that would warrant sooner evaluation.            History of Present Illness   43 y/o female with c/o black mass of left labia. First noted a mass a few months ago when showering. [place] small raised area and assumed it was an ingrown hair. Has been applying light pressure in the shower but no change or drainage. Recently with some irritation/tenderness and appeared black and maybe a little larger when she looked with mirror.       Review of Systems   Constitutional:  Negative for fever.   Genitourinary:  Negative for vaginal bleeding and vaginal pain.   Skin:  Positive for color change.     Medical History Reviewed by provider this encounter:     .  Current Outpatient Medications on File Prior to Visit   Medication Sig Dispense Refill    albuterol (Ventolin HFA) 90 mcg/act inhaler Inhale 2 puffs every 6 (six) hours as needed for wheezing or shortness of breath 54 g 0    Cholecalciferol (Vitamin D) 50 MCG ( [place]) CAPS Take 2,000 Units by mouth daily      Continuous Blood Gluc  (FreeStyle Adriana 2 [place]) ALLA Check blood sugars multiple times per day 1 each 0    Continuous Glucose Sensor (FreeStyle Adriana 2 Sensor) MISC Check blood sugars multiple times per day 6 each 1    escitalopram (LEXAPRO) 20 mg tablet Take 1 tablet (20 mg total) by mouth daily 90 tablet 1    hydrOXYzine HCL (ATARAX) 25 mg tablet TAKE 1 TABLET DAILY AT BEDTIME AS NEEDED FOR ANXIETY 90 tablet 3    loratadine (CLARITIN) 10 mg tablet Take 10 mg by mouth  daily      metFORMIN (GLUCOPHAGE-XR) 500 mg 24 hr tablet Take 1 tablet (500 mg total) by mouth daily with breakfast 180 tablet 1    montelukast (SINGULAIR) 10 mg tablet Take 1 tablet (10 mg total) by mouth daily at bedtime 90 tablet 1    rosuvastatin (CRESTOR) 5 mg tablet Take 1 tablet (5 mg total) by mouth daily 90 tablet 1     No current facility-administered medications on file prior to visit.      Social History     Tobacco Use    Smoking status: Former     Current packs/day: 0.00     Average packs/day: 0.5 packs/day for 10.0 years (5.0 ttl pk-yrs)     Types: Cigarettes     Start date: 2003     Quit date: 2013     Years since quittin.7    Smokeless tobacco: Never    Tobacco comments:     former smoker (as per allscripts)   Vaping Use    Vaping status: Some Days   Substance and Sexual Activity    Alcohol use: Yes     Comment: rarely    Drug use: Yes     Types: Marijuana     Comment: medical marijuana    Sexual activity: Yes     Partners: Male     Birth control/protection: Female Sterilization        Objective   BP 94/62 (BP Location: Left arm, Patient Position: Sitting, Cuff Size: Large)   LMP 2025 (Approximate)      Physical Exam  Vitals and nursing note reviewed.   Constitutional:       General: She is not in acute distress.     Appearance: Normal appearance. She is not ill-appearing.   HENT:      Head: Normocephalic and atraumatic.   Eyes:      Conjunctiva/sclera: Conjunctivae normal.   Pulmonary:      Effort: Pulmonary effort is normal.   Genitourinary:     Comments: 2 mm purple-black papule of the left inferior labia majora. There is a single hair follicle arising adjacent this with mild tenderness of this follicle. No erythema, warmth, induration or signs of infection.  Skin:     General: Skin is warm and dry.   Neurological:      General: No focal deficit present.      Mental Status: She is alert.   Psychiatric:         Mood and Affect: Mood normal.         Behavior: Behavior normal.

## 2025-02-18 ENCOUNTER — TELEPHONE (OUTPATIENT)
Dept: GASTROENTEROLOGY | Facility: MEDICAL CENTER | Age: 45
End: 2025-02-18

## 2025-02-18 NOTE — TELEPHONE ENCOUNTER
Called and left voicemail requesting a call back to reschedule follow up visit with Kristan for 05/08/2025

## 2025-02-24 ENCOUNTER — HOSPITAL ENCOUNTER (OUTPATIENT)
Dept: MAMMOGRAPHY | Facility: MEDICAL CENTER | Age: 45
Discharge: HOME | End: 2025-02-24
Payer: COMMERCIAL

## 2025-02-24 VITALS — HEIGHT: 67 IN | WEIGHT: 242.51 LBS | BODY MASS INDEX: 38.06 KG/M2

## 2025-02-24 DIAGNOSIS — Z12.31 ENCOUNTER FOR SCREENING MAMMOGRAM FOR MALIGNANT NEOPLASM OF BREAST: ICD-10-CM

## 2025-02-24 PROCEDURE — 77067 SCR MAMMO BI INCL CAD: CPT

## 2025-02-24 PROCEDURE — 77063 BREAST TOMOSYNTHESIS BI: CPT

## 2025-03-02 NOTE — NURSING NOTE
Patient right breast tolerated biopsy well, no complaints verbalized  After holding pressure to site, steri-strips applied and band aide  IV removed from left AC site by RT  Post procedure discharge instructions and ice packs given to patient with verbal communication of understanding  Patient left MRI suite ambulating with no complaints 
No

## 2025-03-06 ENCOUNTER — OFFICE VISIT (OUTPATIENT)
Dept: FAMILY MEDICINE CLINIC | Facility: CLINIC | Age: 45
End: 2025-03-06
Payer: COMMERCIAL

## 2025-03-06 VITALS
OXYGEN SATURATION: 98 % | HEIGHT: 67 IN | SYSTOLIC BLOOD PRESSURE: 118 MMHG | TEMPERATURE: 97.7 F | DIASTOLIC BLOOD PRESSURE: 80 MMHG | BODY MASS INDEX: 37.98 KG/M2 | HEART RATE: 88 BPM

## 2025-03-06 DIAGNOSIS — Z23 ENCOUNTER FOR IMMUNIZATION: ICD-10-CM

## 2025-03-06 DIAGNOSIS — F33.1 MAJOR DEPRESSIVE DISORDER, RECURRENT, MODERATE (HCC): ICD-10-CM

## 2025-03-06 DIAGNOSIS — J45.40 MODERATE PERSISTENT ASTHMA WITHOUT COMPLICATION: ICD-10-CM

## 2025-03-06 DIAGNOSIS — E11.9 TYPE 2 DIABETES MELLITUS WITHOUT COMPLICATION, WITHOUT LONG-TERM CURRENT USE OF INSULIN (HCC): Primary | ICD-10-CM

## 2025-03-06 PROCEDURE — 90677 PCV20 VACCINE IM: CPT | Performed by: FAMILY MEDICINE

## 2025-03-06 PROCEDURE — 99214 OFFICE O/P EST MOD 30 MIN: CPT | Performed by: FAMILY MEDICINE

## 2025-03-06 PROCEDURE — 90471 IMMUNIZATION ADMIN: CPT | Performed by: FAMILY MEDICINE

## 2025-03-06 RX ORDER — BUPROPION HYDROCHLORIDE 150 MG/1
150 TABLET ORAL EVERY MORNING
Qty: 30 TABLET | Refills: 1 | Status: SHIPPED | OUTPATIENT
Start: 2025-03-06

## 2025-03-06 NOTE — ASSESSMENT & PLAN NOTE
Lab Results   Component Value Date    HGBA1C 5.1 08/16/2024   Excellent control; continue metformin 500mg daily  Follow up with endocrinology as scheduled

## 2025-03-06 NOTE — ASSESSMENT & PLAN NOTE
Patient reports having to use her albuterol inhaler more frequently in the last few weeks but in general her asthma has been stable on Singulair 10mg. Will continue to monitor

## 2025-03-06 NOTE — PROGRESS NOTES
Name: Maura Wong      : 1980      MRN: [id number]  Encounter Provider: Julianne Lr MD  Encounter Date: 3/6/2025   Encounter department: [facility]  :  Assessment & Plan  Type 2 diabetes mellitus without complication, without long-term current use of insulin (HCC)    Lab Results   Component Value Date    HGBA1C 5.1 2024   Excellent control; continue metformin 500mg daily  Follow up with endocrinology as scheduled          Moderate persistent asthma without complication  Patient reports having to use her albuterol inhaler more frequently in the last few weeks but in general her asthma has been stable on Singulair 10mg. Will continue to monitor         Major depressive disorder, recurrent, moderate (HCC)  Depression Screening Follow-up Plan: Patient's depression screening was positive with a PHQ-9 score of 12. Patient assessed for underlying major depression. They have no active suicidal ideations. Brief counseling provided and recommend additional follow-up/re-evaluation next office visit.  Continue lexapro 20mg daily and add wellbutrin 150mg daily   Follow up in 4 weeks     Orders:    buPROPion (Wellbutrin XL) 150 mg 24 hr tablet; Take 1 tablet (150 mg total) by mouth every morning    Encounter for immunization    Orders:    Pneumococcal Conjugate Vaccine 20-valent (Pcv20)           History of Present Illness   HPI    Maura Wong is a very pleasant 44-year-old female who presents today for follow-up.  Patient's main concern today is regarding her depression.  She is currently on Lexapro 20 mg however she thinks that her medication needs to be adjusted and to possibly add another medication.  She reports feeling very depressed regarding the current political climate and feels very unmotivated to do things on the weekends.  At one point she was seeing a therapist however had to switch to a different one and did not connect with them.  She would like to find a different therapist  "however it is not in the budget right now.    Review of Systems   Constitutional: Negative.    HENT: Negative.     Eyes: Negative.    Respiratory: Negative.     Cardiovascular: Negative.    Gastrointestinal: Negative.    Genitourinary: Negative.    Musculoskeletal: Negative.    Skin: Negative.    Neurological: Negative.    Psychiatric/Behavioral:  Positive for dysphoric mood and sleep disturbance.        Objective   /80 (BP Location: Left arm, Patient Position: Sitting, Cuff Size: Large)   Pulse 88   Temp 97.7 °F (36.5 °C)   Ht 5' 7\" (1.702 m)   LMP 01/20/2025 (Approximate)   SpO2 98%   BMI 37.98 kg/m²      Physical Exam  Constitutional:       General: She is not in acute distress.     Appearance: She is not ill-appearing.   HENT:      Head: Normocephalic and atraumatic.   Eyes:      General:         Right eye: No discharge.         Left eye: No discharge.      Extraocular Movements: Extraocular movements intact.      Pupils: Pupils are equal, round, and reactive to light.   Cardiovascular:      Rate and Rhythm: Normal rate.   Pulmonary:      Effort: Pulmonary effort is normal. No respiratory distress.      Breath sounds: No wheezing.   Abdominal:      General: Bowel sounds are normal. There is no distension.      Palpations: Abdomen is soft.      Tenderness: There is no abdominal tenderness.   Musculoskeletal:      Right lower leg: No edema.      Left lower leg: No edema.   Neurological:      General: No focal deficit present.      Mental Status: She is alert.   Psychiatric:         Mood and Affect: Mood normal.         Behavior: Behavior normal.         "

## 2025-03-06 NOTE — ASSESSMENT & PLAN NOTE
Depression Screening Follow-up Plan: Patient's depression screening was positive with a PHQ-9 score of 12. Patient assessed for underlying major depression. They have no active suicidal ideations. Brief counseling provided and recommend additional follow-up/re-evaluation next office visit.  Continue lexapro 20mg daily and add wellbutrin 150mg daily   Follow up in 4 weeks     Orders:    buPROPion (Wellbutrin XL) 150 mg 24 hr tablet; Take 1 tablet (150 mg total) by mouth every morning

## 2025-03-28 DIAGNOSIS — F33.1 MAJOR DEPRESSIVE DISORDER, RECURRENT, MODERATE (HCC): ICD-10-CM

## 2025-03-28 RX ORDER — BUPROPION HYDROCHLORIDE 150 MG/1
150 TABLET ORAL EVERY MORNING
Qty: 90 TABLET | Refills: 1 | Status: SHIPPED | OUTPATIENT
Start: 2025-03-28

## 2025-04-03 ENCOUNTER — OFFICE VISIT (OUTPATIENT)
Dept: ENDOCRINOLOGY | Facility: CLINIC | Age: 45
End: 2025-04-03
Payer: COMMERCIAL

## 2025-04-03 VITALS
HEIGHT: 67 IN | HEART RATE: 85 BPM | BODY MASS INDEX: 37.98 KG/M2 | DIASTOLIC BLOOD PRESSURE: 82 MMHG | SYSTOLIC BLOOD PRESSURE: 120 MMHG | OXYGEN SATURATION: 98 %

## 2025-04-03 DIAGNOSIS — E04.2 MULTIPLE THYROID NODULES: ICD-10-CM

## 2025-04-03 DIAGNOSIS — E78.5 HYPERLIPIDEMIA, UNSPECIFIED HYPERLIPIDEMIA TYPE: ICD-10-CM

## 2025-04-03 DIAGNOSIS — E11.9 TYPE 2 DIABETES MELLITUS WITHOUT COMPLICATION, WITHOUT LONG-TERM CURRENT USE OF INSULIN (HCC): Primary | ICD-10-CM

## 2025-04-03 DIAGNOSIS — E55.9 VITAMIN D DEFICIENCY: ICD-10-CM

## 2025-04-03 LAB — SL AMB POCT HEMOGLOBIN AIC: 5.4 (ref ?–6.5)

## 2025-04-03 PROCEDURE — 99214 OFFICE O/P EST MOD 30 MIN: CPT | Performed by: INTERNAL MEDICINE

## 2025-04-03 PROCEDURE — 83036 HEMOGLOBIN GLYCOSYLATED A1C: CPT | Performed by: INTERNAL MEDICINE

## 2025-04-03 NOTE — ASSESSMENT & PLAN NOTE
Repeat thyroid ultrasound next year and if stable she may not need further imaging  Orders:    TSH, 3rd generation; Future

## 2025-04-03 NOTE — PROGRESS NOTES
Name: Maura Wong      : 1980      MRN: [id number]  Encounter Provider: Charu Hadley MD  Encounter Date: 4/3/2025   Encounter department: [facility] [place]    No chief complaint on file.  :  Assessment & Plan  Type 2 diabetes mellitus without complication, without long-term current use of insulin (HCC)    Lab Results   Component Value Date    HGBA1C 5.4 2025   Well-controlled, discontinue metformin, continue dietary modifications and weight loss    Orders:    POCT hemoglobin A1c    Comprehensive metabolic panel; Future    Hemoglobin A1C; Future    Multiple thyroid nodules  Repeat thyroid ultrasound next year and if stable she may not need further imaging  Orders:    TSH, 3rd generation; Future    Vitamin D deficiency  Continue supplementations  Orders:    Vitamin D 25 hydroxy; Future    Hyperlipidemia, unspecified hyperlipidemia type  Continue statins  Orders:    Lipid Panel with Direct LDL reflex; Future        History of Present Illness     Maura Wong is a 44 y.o. female with type 2 DM, thyroid nodules, vitamin D deficiency seen in follow-up    Not checking fingersticks    Taking metformin once a day-no GI SE    No polyuria , polydipsia ,  no blurry vision , +numbness and tingling in feet     No obstructive symptoms     Not sleeping well       Current regimen:   Metformin       Last Eye Exam: Not on file  Last Foot Exam: 2024  Health Maintenance   Topic Date Due    Diabetic Eye Exam  Never done    Diabetic Foot Exam  2025     Pertinent Medical History           Review of Systems as per HPI    Current Outpatient Medications on File Prior to Visit   Medication Sig Dispense Refill    albuterol (Ventolin HFA) 90 mcg/act inhaler Inhale 2 puffs every 6 (six) hours as needed for wheezing or shortness of breath 54 g 0    buPROPion (WELLBUTRIN XL) 150 mg 24 hr tablet TAKE 1 TABLET BY MOUTH EVERY DAY IN THE MORNING 90 tablet 1     "Cholecalciferol (Vitamin D) 50 MCG ( UT) CAPS Take 2,000 Units by mouth daily      escitalopram (LEXAPRO) 20 mg tablet Take 1 tablet (20 mg total) by mouth daily 90 tablet 1    hydrOXYzine HCL (ATARAX) 25 mg tablet TAKE 1 TABLET DAILY AT BEDTIME AS NEEDED FOR ANXIETY 90 tablet 3    loratadine (CLARITIN) 10 mg tablet Take 10 mg by mouth daily      montelukast (SINGULAIR) 10 mg tablet Take 1 tablet (10 mg total) by mouth daily at bedtime 90 tablet 1    rosuvastatin (CRESTOR) 5 mg tablet Take 1 tablet (5 mg total) by mouth daily 90 tablet 1    [DISCONTINUED] metFORMIN (GLUCOPHAGE-XR) 500 mg 24 hr tablet Take 1 tablet (500 mg total) by mouth daily with breakfast 180 tablet 1    [DISCONTINUED] Continuous Blood Gluc  (FreeStyle Adriana 2 [place]) ALLA Check blood sugars multiple times per day (Patient not taking: Reported on 4/3/2025) 1 each 0    [DISCONTINUED] Continuous Glucose Sensor (FreeStyle Adriana 2 Sensor) MISC Check blood sugars multiple times per day (Patient not taking: Reported on 4/3/2025) 6 each 1     No current facility-administered medications on file prior to visit.      Social History     Tobacco Use    Smoking status: Former     Current packs/day: 0.00     Average packs/day: 0.5 packs/day for 10.0 years (5.0 ttl pk-yrs)     Types: Cigarettes     Start date: 2003     Quit date: 2013     Years since quittin.8    Smokeless tobacco: Never    Tobacco comments:     former smoker (as per allscripts)   Vaping Use    Vaping status: Some Days   Substance and Sexual Activity    Alcohol use: Yes     Comment: rarely    Drug use: Yes     Types: Marijuana     Comment: medical marijuana    Sexual activity: Yes     Partners: Male     Birth control/protection: Female Sterilization        Medical History Reviewed by provider this encounter:  Meds     .    Objective   /82   Pulse 85   Ht 5' 7\" (1.702 m)   SpO2 98%   BMI 37.98 kg/m²      Body mass index is 37.98 kg/m².  Wt Readings from " Last 3 Encounters:   02/24/25 110 kg (242 lb 8.1 oz)   02/02/24 110 kg (242 lb)   01/28/24 110 kg (242 lb 8.1 oz)     Physical Exam  Vitals reviewed.   Constitutional:       General: She is not in acute distress.     Appearance: Normal appearance. She is obese. She is not ill-appearing, toxic-appearing or diaphoretic.   HENT:      Head: Normocephalic and atraumatic.   Eyes:      General: No scleral icterus.     Extraocular Movements: Extraocular movements intact.   Cardiovascular:      Rate and Rhythm: Normal rate and regular rhythm.      Heart sounds: Normal heart sounds. No murmur heard.  Pulmonary:      Effort: Pulmonary effort is normal. No respiratory distress.      Breath sounds: Normal breath sounds. No wheezing.   Musculoskeletal:      Cervical back: Neck supple.      Right lower leg: No edema.      Left lower leg: No edema.   Lymphadenopathy:      Cervical: No cervical adenopathy.   Skin:     General: Skin is warm and dry.   Neurological:      General: No focal deficit present.      Mental Status: She is alert and oriented to person, place, and time.   Psychiatric:         Mood and Affect: Mood normal.         Behavior: Behavior normal.         Thought Content: Thought content normal.         Judgment: Judgment normal.         Labs:   Lab Results   Component Value Date    HGBA1C 5.4 04/03/2025    HGBA1C 5.1 08/16/2024    HGBA1C 5.1 05/14/2024     Lab Results   Component Value Date    CREATININE 0.81 08/16/2024    CREATININE 0.78 07/18/2024    CREATININE 0.81 02/03/2024    BUN 12 08/16/2024     12/28/2016    K 4.0 08/16/2024     08/16/2024    CO2 24 08/16/2024     GFR, Calculated   Date Value Ref Range Status   03/29/2018 102 >60 mL/min/1.73m2 Final     Comment:     mL/min per 1.73 square meters                                            Normal Function or Mild Renal    Disease (if clinically at risk):  >or=60  Moderately Decreased:                30-59  Severely Decreased:                   15-29  Renal Failure:                         <15                                            -American GFR: multiply reported GFR by 1.16    Please note that the eGFR is based on the CKD-EPI calculation, and is not intended to be used for drug dosing.     eGFRcr   Date Value Ref Range Status   04/17/2022 88 >59 Final     eGFR   Date Value Ref Range Status   08/16/2024 87 ml/min/1.73sq m Final     Lab Results   Component Value Date    CHOL 161 12/28/2016    HDL 35 (L) 02/03/2024    TRIG 179 (H) 02/03/2024     Lab Results   Component Value Date    ALT 14 08/16/2024    AST 13 08/16/2024    ALKPHOS 53 08/16/2024    BILITOT 0.3 12/28/2016     Lab Results   Component Value Date    [id number] 1.415 08/16/2024    [id number] 0.695 11/26/2021    [id number] 1.160 11/06/2020       There are no Patient Instructions on file for this visit.    Discussed with the patient and all questioned fully answered. She will call me if any problems arise.

## 2025-04-03 NOTE — ASSESSMENT & PLAN NOTE
Lab Results   Component Value Date    HGBA1C 5.4 04/03/2025   Well-controlled, discontinue metformin, continue dietary modifications and weight loss    Orders:    POCT hemoglobin A1c    Comprehensive metabolic panel; Future    Hemoglobin A1C; Future

## 2025-04-15 ENCOUNTER — HOSPITAL ENCOUNTER (OUTPATIENT)
Dept: MAMMOGRAPHY | Facility: CLINIC | Age: 45
Discharge: HOME | End: 2025-04-15
Payer: COMMERCIAL

## 2025-04-15 ENCOUNTER — HOSPITAL ENCOUNTER (OUTPATIENT)
Dept: ULTRASOUND IMAGING | Facility: CLINIC | Age: 45
Discharge: HOME | End: 2025-04-15
Payer: COMMERCIAL

## 2025-04-15 DIAGNOSIS — R92.8 ABNORMAL MAMMOGRAM: ICD-10-CM

## 2025-04-15 PROCEDURE — G0279 TOMOSYNTHESIS, MAMMO: HCPCS

## 2025-04-15 PROCEDURE — 77065 DX MAMMO INCL CAD UNI: CPT

## 2025-04-15 PROCEDURE — 76642 ULTRASOUND BREAST LIMITED: CPT

## 2025-04-15 NOTE — PROGRESS NOTES
Met with patient and Dr. Noe regarding recommendation for;    __X___ RIGHT ______LEFT      __X___Ultrasound guided  ______Stereotactic breast biopsy.      __X___Verbalized understanding.    Reviewed clip placement with patient, pt states understanding: Yes: __X__ No: ____  Comments:    Blood thinners:  No: __X___ Yes: ______ What:          Biopsy teaching sheet given:  Yes: ___X___ No: ________    Pt given contact information and adv to call with any questions/needs    Patient advised to arrive at 1430 for a 1500 appointment

## 2025-04-24 ENCOUNTER — OFFICE VISIT (OUTPATIENT)
Dept: FAMILY MEDICINE CLINIC | Facility: CLINIC | Age: 45
End: 2025-04-24
Payer: COMMERCIAL

## 2025-04-24 VITALS
BODY MASS INDEX: 37.98 KG/M2 | HEIGHT: 67 IN | HEART RATE: 66 BPM | SYSTOLIC BLOOD PRESSURE: 122 MMHG | TEMPERATURE: 97.6 F | DIASTOLIC BLOOD PRESSURE: 78 MMHG | RESPIRATION RATE: 16 BRPM | OXYGEN SATURATION: 95 %

## 2025-04-24 DIAGNOSIS — F33.1 MAJOR DEPRESSIVE DISORDER, RECURRENT, MODERATE (HCC): Primary | ICD-10-CM

## 2025-04-24 PROCEDURE — 99213 OFFICE O/P EST LOW 20 MIN: CPT | Performed by: FAMILY MEDICINE

## 2025-04-24 RX ORDER — BUPROPION HYDROCHLORIDE 75 MG/1
75 TABLET ORAL DAILY
Qty: 90 TABLET | Refills: 0 | Status: SHIPPED | OUTPATIENT
Start: 2025-04-24

## 2025-04-24 NOTE — ASSESSMENT & PLAN NOTE
Continue Lexapro 20 mg and decrease dose of Wellbutrin to 75 mg daily.  Patient will call or send message via [employer] with a status update.  Orders:    buPROPion (WELLBUTRIN) 75 mg tablet; Take 1 tablet (75 mg total) by mouth in the morning

## 2025-04-24 NOTE — PROGRESS NOTES
"Name: Maura Wong      : 1980      MRN: [id number]  Encounter Provider: Julianne Lr MD  Encounter Date: 2025   Encounter department: [facility]  :  Assessment & Plan  Major depressive disorder, recurrent, moderate (HCC)    Continue Lexapro 20 mg and decrease dose of Wellbutrin to 75 mg daily.  Patient will call or send message via itBit with a status update.  Orders:    buPROPion (WELLBUTRIN) 75 mg tablet; Take 1 tablet (75 mg total) by mouth in the morning      Follow-up for annual physical or sooner if needed     History of Present Illness   HPI    Maura Wong is a very pleasant 44-year-old female who presents today for follow-up.  Since starting the Wellbutrin she has noticed an improvement with her depression however states that it causes her heart to race and then she gets short of breath.  She states that this only started when she started the Wellbutrin.  She also had an ADHD assessment which was consistent with combined type ADHD.  She is interested in start medication for ADHD however would like to get her depression symptoms under control first.    Review of Systems   Constitutional: Negative.    HENT: Negative.     Eyes: Negative.    Respiratory: Negative.     Cardiovascular: Negative.    Gastrointestinal: Negative.    Genitourinary: Negative.    Musculoskeletal: Negative.    Skin: Negative.    Neurological: Negative.    Psychiatric/Behavioral: Negative.         Objective   /78 (BP Location: Left arm, Patient Position: Sitting, Cuff Size: Large)   Pulse 66   Temp 97.6 °F (36.4 °C) (Tympanic)   Resp 16   Ht 5' 7\" (1.702 m)   SpO2 95%   BMI 37.98 kg/m²      Physical Exam  Constitutional:       General: She is not in acute distress.     Appearance: She is not ill-appearing.   HENT:      Head: Normocephalic and atraumatic.   Eyes:      General:         Right eye: No discharge.         Left eye: No discharge.      Extraocular Movements: Extraocular movements " intact.   Cardiovascular:      Rate and Rhythm: Normal rate.   Pulmonary:      Effort: Pulmonary effort is normal. No respiratory distress.   Neurological:      General: No focal deficit present.      Mental Status: She is alert.   Psychiatric:         Mood and Affect: Mood normal.         Behavior: Behavior normal.

## 2025-05-15 DIAGNOSIS — F33.1 MODERATE EPISODE OF RECURRENT MAJOR DEPRESSIVE DISORDER (HCC): ICD-10-CM

## 2025-05-15 DIAGNOSIS — E11.9 TYPE 2 DIABETES MELLITUS WITHOUT COMPLICATION, WITHOUT LONG-TERM CURRENT USE OF INSULIN (HCC): ICD-10-CM

## 2025-05-15 DIAGNOSIS — J45.40 MODERATE PERSISTENT ASTHMA WITHOUT COMPLICATION: ICD-10-CM

## 2025-05-16 RX ORDER — ESCITALOPRAM OXALATE 20 MG/1
20 TABLET ORAL DAILY
Qty: 90 TABLET | Refills: 1 | Status: SHIPPED | OUTPATIENT
Start: 2025-05-16

## 2025-05-16 RX ORDER — MONTELUKAST SODIUM 10 MG/1
10 TABLET ORAL
Qty: 90 TABLET | Refills: 1 | Status: SHIPPED | OUTPATIENT
Start: 2025-05-16

## 2025-05-16 RX ORDER — ROSUVASTATIN CALCIUM 5 MG/1
5 TABLET, COATED ORAL DAILY
Qty: 90 TABLET | Refills: 0 | Status: SHIPPED | OUTPATIENT
Start: 2025-05-16

## 2025-05-20 NOTE — TELEPHONE ENCOUNTER
Called patient got RON NICOLE to call back.  
My chart message sent  
[FreeTextEntry1] : Pulmonary function test performed on June 29, 2023: Spirometry is within normal limits; lung volume is within normal limits; resistance is increased; diffusion shows mild impairment.

## 2025-05-28 ENCOUNTER — HOSPITAL ENCOUNTER (OUTPATIENT)
Dept: MAMMOGRAPHY | Facility: CLINIC | Age: 45
Discharge: HOME | End: 2025-05-28
Payer: COMMERCIAL

## 2025-05-28 ENCOUNTER — HOSPITAL ENCOUNTER (OUTPATIENT)
Dept: ULTRASOUND IMAGING | Facility: CLINIC | Age: 45
Discharge: HOME | End: 2025-05-28
Attending: PHYSICIAN ASSISTANT
Payer: COMMERCIAL

## 2025-05-28 VITALS — SYSTOLIC BLOOD PRESSURE: 126 MMHG | DIASTOLIC BLOOD PRESSURE: 86 MMHG | HEART RATE: 83 BPM

## 2025-05-28 DIAGNOSIS — R92.8 ABNORMAL ULTRASOUND OF BREAST: ICD-10-CM

## 2025-05-28 DIAGNOSIS — R92.8 ABNORMAL MAMMOGRAM: ICD-10-CM

## 2025-05-28 PROCEDURE — 19083 BX BREAST 1ST LESION US IMAG: CPT

## 2025-05-28 PROCEDURE — 88305 TISSUE EXAM BY PATHOLOGIST: CPT | Performed by: PATHOLOGY

## 2025-05-28 PROCEDURE — A4648 IMPLANTABLE TISSUE MARKER: HCPCS

## 2025-05-28 RX ORDER — LIDOCAINE HYDROCHLORIDE 10 MG/ML
5 INJECTION, SOLUTION EPIDURAL; INFILTRATION; INTRACAUDAL; PERINEURAL ONCE
Status: COMPLETED | OUTPATIENT
Start: 2025-05-28 | End: 2025-05-28

## 2025-05-28 RX ADMIN — LIDOCAINE HYDROCHLORIDE 5 ML: 10 INJECTION, SOLUTION EPIDURAL; INFILTRATION; INTRACAUDAL; PERINEURAL at 14:59

## 2025-05-28 NOTE — PROGRESS NOTES
Procedure type:    ___x__ultrasound guided _____stereotactic    Breast:    _____Left ___x__Right    Location: 6:00 10 cmfn    Needle: 14g    # of passes: 3     Clip: Butterfly     Performed by: Dr. Gurrola     Pressure held for 5 minutes by: Chelsi Webb Strips:    ___X__yes _____no    Band aid:    __X___yes_____no    Tolerated procedure:    __X___yes _____no

## 2025-05-29 ENCOUNTER — RESULTS FOLLOW-UP (OUTPATIENT)
Dept: OBGYN CLINIC | Facility: CLINIC | Age: 45
End: 2025-05-29

## 2025-05-29 ENCOUNTER — TELEPHONE (OUTPATIENT)
Dept: MAMMOGRAPHY | Facility: CLINIC | Age: 45
End: 2025-05-29

## 2025-05-29 PROCEDURE — 88305 TISSUE EXAM BY PATHOLOGIST: CPT | Performed by: PATHOLOGY

## 2025-06-16 DIAGNOSIS — E11.9 TYPE 2 DIABETES MELLITUS WITHOUT COMPLICATION, WITHOUT LONG-TERM CURRENT USE OF INSULIN (HCC): Primary | ICD-10-CM

## 2025-06-17 ENCOUNTER — TELEPHONE (OUTPATIENT)
Age: 45
End: 2025-06-17

## 2025-06-17 NOTE — TELEPHONE ENCOUNTER
Scheduled patient follow up appointment(per Dr. Hadley wanted patient back in July after restarting Metformin)  with Lindsey as Dr. Hadley had no availability for the month of July.  Will send a message to Dr. Hadley making her aware and if she is ok with patient seeing Lindsey.

## 2025-07-09 ENCOUNTER — APPOINTMENT (OUTPATIENT)
Dept: LAB | Facility: MEDICAL CENTER | Age: 45
End: 2025-07-09
Payer: COMMERCIAL

## 2025-07-09 DIAGNOSIS — Z91.89 INCREASED RISK OF BREAST CANCER: ICD-10-CM

## 2025-07-09 DIAGNOSIS — Z80.3 FAMILY HISTORY OF BREAST CANCER: ICD-10-CM

## 2025-07-09 DIAGNOSIS — K75.81 METABOLIC DYSFUNCTION-ASSOCIATED STEATOHEPATITIS (MASH): ICD-10-CM

## 2025-07-09 DIAGNOSIS — E04.2 MULTIPLE THYROID NODULES: ICD-10-CM

## 2025-07-09 DIAGNOSIS — E78.5 HYPERLIPIDEMIA, UNSPECIFIED HYPERLIPIDEMIA TYPE: ICD-10-CM

## 2025-07-09 DIAGNOSIS — E11.9 TYPE 2 DIABETES MELLITUS WITHOUT COMPLICATION, WITHOUT LONG-TERM CURRENT USE OF INSULIN (HCC): ICD-10-CM

## 2025-07-09 DIAGNOSIS — E55.9 VITAMIN D DEFICIENCY: ICD-10-CM

## 2025-07-09 LAB
25(OH)D3 SERPL-MCNC: 19.5 NG/ML (ref 30–100)
ALBUMIN SERPL BCG-MCNC: 4.1 G/DL (ref 3.5–5)
ALP SERPL-CCNC: 50 U/L (ref 34–104)
ALT SERPL W P-5'-P-CCNC: 24 U/L (ref 7–52)
ANION GAP SERPL CALCULATED.3IONS-SCNC: 8 MMOL/L (ref 4–13)
AST SERPL W P-5'-P-CCNC: 19 U/L (ref 13–39)
BILIRUB DIRECT SERPL-MCNC: 0.11 MG/DL (ref 0–0.2)
BILIRUB SERPL-MCNC: 0.39 MG/DL (ref 0.2–1)
BUN SERPL-MCNC: 12 MG/DL (ref 5–25)
CALCIUM SERPL-MCNC: 8.7 MG/DL (ref 8.4–10.2)
CHLORIDE SERPL-SCNC: 105 MMOL/L (ref 96–108)
CHOLEST SERPL-MCNC: 100 MG/DL (ref ?–200)
CO2 SERPL-SCNC: 28 MMOL/L (ref 21–32)
CREAT SERPL-MCNC: 0.89 MG/DL (ref 0.6–1.3)
CREAT UR-MCNC: 90.1 MG/DL
EST. AVERAGE GLUCOSE BLD GHB EST-MCNC: 120 MG/DL
GFR SERPL CREATININE-BSD FRML MDRD: 77 ML/MIN/1.73SQ M
GLUCOSE P FAST SERPL-MCNC: 115 MG/DL (ref 65–99)
HBA1C MFR BLD: 5.8 %
HDLC SERPL-MCNC: 39 MG/DL
LDLC SERPL CALC-MCNC: 39 MG/DL (ref 0–100)
MICROALBUMIN UR-MCNC: <7 MG/L
POTASSIUM SERPL-SCNC: 4.2 MMOL/L (ref 3.5–5.3)
PROT SERPL-MCNC: 6.8 G/DL (ref 6.4–8.4)
SODIUM SERPL-SCNC: 141 MMOL/L (ref 135–147)
TRIGL SERPL-MCNC: 111 MG/DL (ref ?–150)
TSH SERPL DL<=0.05 MIU/L-ACNC: 0.84 UIU/ML (ref 0.45–4.5)

## 2025-07-09 PROCEDURE — 36415 COLL VENOUS BLD VENIPUNCTURE: CPT

## 2025-07-09 PROCEDURE — 82248 BILIRUBIN DIRECT: CPT

## 2025-07-09 PROCEDURE — 82306 VITAMIN D 25 HYDROXY: CPT

## 2025-07-09 PROCEDURE — 83036 HEMOGLOBIN GLYCOSYLATED A1C: CPT

## 2025-07-09 PROCEDURE — 82570 ASSAY OF URINE CREATININE: CPT

## 2025-07-09 PROCEDURE — 80053 COMPREHEN METABOLIC PANEL: CPT

## 2025-07-09 PROCEDURE — 82043 UR ALBUMIN QUANTITATIVE: CPT

## 2025-07-09 PROCEDURE — 80061 LIPID PANEL: CPT

## 2025-07-09 PROCEDURE — 84443 ASSAY THYROID STIM HORMONE: CPT

## 2025-07-10 ENCOUNTER — OFFICE VISIT (OUTPATIENT)
Dept: ENDOCRINOLOGY | Facility: CLINIC | Age: 45
End: 2025-07-10
Payer: COMMERCIAL

## 2025-07-10 VITALS
DIASTOLIC BLOOD PRESSURE: 70 MMHG | HEIGHT: 67 IN | BODY MASS INDEX: 37.98 KG/M2 | OXYGEN SATURATION: 98 % | SYSTOLIC BLOOD PRESSURE: 110 MMHG | HEART RATE: 68 BPM

## 2025-07-10 DIAGNOSIS — E78.5 HYPERLIPIDEMIA, UNSPECIFIED HYPERLIPIDEMIA TYPE: ICD-10-CM

## 2025-07-10 DIAGNOSIS — E11.9 TYPE 2 DIABETES MELLITUS WITHOUT COMPLICATION, WITHOUT LONG-TERM CURRENT USE OF INSULIN (HCC): Primary | ICD-10-CM

## 2025-07-10 DIAGNOSIS — E55.9 VITAMIN D DEFICIENCY: ICD-10-CM

## 2025-07-10 DIAGNOSIS — E04.2 MULTIPLE THYROID NODULES: ICD-10-CM

## 2025-07-10 PROCEDURE — 95251 CONT GLUC MNTR ANALYSIS I&R: CPT | Performed by: PHYSICIAN ASSISTANT

## 2025-07-10 PROCEDURE — 99214 OFFICE O/P EST MOD 30 MIN: CPT | Performed by: PHYSICIAN ASSISTANT

## 2025-07-10 RX ORDER — METFORMIN HYDROCHLORIDE 500 MG/1
1000 TABLET, EXTENDED RELEASE ORAL
Qty: 180 TABLET | Refills: 2 | Status: SHIPPED | OUTPATIENT
Start: 2025-07-10

## 2025-07-10 NOTE — ASSESSMENT & PLAN NOTE
Vitamin D: 19.5  Increase supplement to 5000IU Daily  Repeat in 3 months  Orders:  •  Vitamin D 25 hydroxy; Future  •  Cholecalciferol (Vitamin D-3) 125 MCG (5000 UT) TABS; Take 5,000 Units by mouth in the morning

## 2025-07-10 NOTE — ASSESSMENT & PLAN NOTE
Initiate Wegovy  Weight management referral   Orders:  •  Ambulatory Referral to Weight Management; Future  •  Semaglutide-Weight Management (WEGOVY) 0.25 MG/0.5ML; Inject 0.5 mL (0.25 mg total) under the skin once a week

## 2025-07-10 NOTE — PROGRESS NOTES
Name: Maura Wong      : 1980      MRN: [id number]  Encounter Provider: Lindsey Poole PA-C  Encounter Date: 7/10/2025   Encounter department: [facility] [place]    Chief Complaint   Patient presents with   • Diabetes Type 2   • thyroid nodules   • enlarged thyroid gland   :  Assessment & Plan  Type 2 diabetes mellitus without complication, without long-term current use of insulin (HCC)    Lab Results   Component Value Date    HGBA1C 5.8 (H) 2025   Current HbA1c represents acceptable control. Blood sugars demonstrating acceptable control  Continue current regimen with the following adjustments:  Increase Metformin to 1000mg daily  Patient pursuing GLP- 1 for weight loss  Advised to adhere to diabetic diet, and recommended staying active/exercising routinely.  Discussed symptoms and treatment of hypoglycemia.    Recommended routine follow-up with Ophthalmology and foot exams.   Ordered blood work to complete prior to next visit.    Orders:  •  metFORMIN (GLUCOPHAGE-XR) 500 mg 24 hr tablet; Take 2 tablets (1,000 mg total) by mouth daily with dinner  •  Comprehensive metabolic panel; Future  •  Hemoglobin A1C; Future    Multiple thyroid nodules  US Thyroid performed 2024   Repeat Thyroid US 2025  Orders:  •  US thyroid; Future    Hyperlipidemia, unspecified hyperlipidemia type  Continue statin.   Check routine lipid panel.        Vitamin D deficiency  Vitamin D: 19.5  Increase supplement to 5000IU Daily  Repeat in 3 months  Orders:  •  Vitamin D 25 hydroxy; Future  •  Cholecalciferol (Vitamin D-3) 125 MCG (5000 UT) TABS; Take 5,000 Units by mouth in the morning    BMI 37.0-37.9, adult  Initiate Wegovy  Weight management referral   Orders:  •  Ambulatory Referral to Weight Management; Future  •  Semaglutide-Weight Management (WEGOVY) 0.25 MG/0.5ML; Inject 0.5 mL (0.25 mg total) under the skin once a week            History of Present Illness {?Quick  Links Encounters * My Last Note * Last Note in Specialty * Snapshot * Since Last Visit * History :37258}  History of Present Illness  Charu Wong is a 45-year-old female with type 2 diabetes and a thyroid nodule, presenting for a follow-up visit. In April 2025, metformin was discontinued due to successful diet management.  Her average blood sugar is now 140, and she is using a continuous glucose monitor (CGM) to track her levels.  This has been concerning to patient so she contacted the office to resume metformin.  Metformin 500 mg daily for a month was ineffective, and she is considering a GLP-1 agonist. She reports increased thirst but no urinary symptoms and has never used injections.    She is managing her eating disorder recovery and is concerned over beginning a weight loss plan though does understand that losing weight will contribute beneficially to her diabetes management. She has fatty liver disease and has been advised to avoid fatty foods and alcohol. She experienced weight gain since discontinuing metformin, increasing from size 16 to 18-20 within a month and a half, despite no changes in her diet.    Her vitamin D levels are low, and she takes supplements when she remembers, though she is unsure of the dosage. She has low HDL cholesterol and previously walked her dog for exercise but has been less active since the dog's passing. She is currently on Crestor. Regarding her thyroid nodule, she has no difficulty swallowing or breathing.    SOCIAL HISTORY  She does not drink alcohol.      CGM Interpretation:  Date Range: 6/27 - 7/10  Device used: Dexcom Stelo  Type: Type 2 Diabetes  Home use     Analysis of data:   Average Glucose: 143  GMI: 6.7%  Coefficient of Variation: 16.5%  SD: 24 mg/dL  Time in Target Range: 92%   Time Above Range: 8% high, 0% very high  Time Below Range: 0% low, 0% very low  Interpretation of data: Well controlled  More than 72 hours of data was reviewed. Report to be scanned to  "chart.     Current diabetic regimen:   Metformin 500mg daily      Review of Systems as per HPI         Medical History Reviewed by provider this encounter:     .    Objective {?Quick Links Trend Vitals * Enter New Vitals * Results Review * Timeline (Adult) * Labs * Imaging * Cardiology * Procedures * Lung Cancer Screening * Surgical eConsent :70258}  /70   Pulse 68   Ht 5' 7\" (1.702 m)   SpO2 98%   BMI 37.98 kg/m²      Body mass index is 37.98 kg/m².  Wt Readings from Last 3 Encounters:   02/24/25 110 kg (242 lb 8.1 oz)   02/02/24 110 kg (242 lb)   01/28/24 110 kg (242 lb 8.1 oz)     Physical Exam  General Appearance: well-developed, well-appearing, in no acute distress.  Vital signs: Within normal limits.  HEENT: Normocephalic and atraumatic.  Eyes: No scleral icterus. Conjunctivae normal.  Respiratory: Pulmonary effort is normal.  Skin: Warm and dry, no rash.  Neurological: Alert.  Psychiatric: Attention normal.  Other observations: Thyroid palpation normal.    Results    Labs: I have reviewed pertinent labs including:   Lab Results   Component Value Date    HGBA1C 5.8 (H) 07/09/2025    HGBA1C 5.4 04/03/2025    HGBA1C 5.1 08/16/2024      Lab Results   Component Value Date    CREATININE 0.89 07/09/2025    CREATININE 0.81 08/16/2024    CREATININE 0.78 07/18/2024    BUN 12 07/09/2025     12/28/2016    K 4.2 07/09/2025     07/09/2025    CO2 28 07/09/2025      GFR, Calculated   Date Value Ref Range Status   03/29/2018 102 >60 mL/min/1.73m2 Final     Comment:     mL/min per 1.73 square meters                                            Normal Function or Mild Renal    Disease (if clinically at risk):  >or=60  Moderately Decreased:                30-59  Severely Decreased:                  15-29  Renal Failure:                         <15                                            -American GFR: multiply reported GFR by 1.16    Please note that the eGFR is based on the CKD-EPI calculation, and " is not intended to be used for drug dosing.     eGFRcr   Date Value Ref Range Status   04/17/2022 88 >59 Final     eGFR   Date Value Ref Range Status   07/09/2025 77 ml/min/1.73sq m Final      Cholesterol   Date Value Ref Range Status   12/28/2016 161 125 - 200 mg/dL Final     HDL   Date Value Ref Range Status   12/28/2016 48 > OR = 46 mg/dL Final     HDL, Direct   Date Value Ref Range Status   07/09/2025 39 (L) >=50 mg/dL Final     Comment:     HDL Cholesterol:       Low     <41 mg/dL     Triglycerides   Date Value Ref Range Status   07/09/2025 111 See Comment mg/dL Final     Comment:     Triglyceride:     0-9Y            <75mg/dL     10Y-17Y         <90 mg/dL       >=18Y     Normal          <150 mg/dL     Borderline High 150-199 mg/dL     High            200-499 mg/dL        Very High       >499 mg/dL    Specimen collection should occur prior to Metamizole administration due to the potential for falsely depressed results.   12/28/2016 150 (H) <150 mg/dL Final      ALT   Date Value Ref Range Status   07/09/2025 24 7 - 52 U/L Final     Comment:     Specimen collection should occur prior to Sulfasalazine administration due to the potential for falsely depressed results.    04/17/2022 28 <56 U/L Final     AST   Date Value Ref Range Status   07/09/2025 19 13 - 39 U/L Final   04/17/2022 20 <41 U/L Final     Alkaline Phosphatase   Date Value Ref Range Status   07/09/2025 50 34 - 104 U/L Final   04/17/2022 47 35 - 120 U/L Final     Total Bilirubin   Date Value Ref Range Status   12/28/2016 0.3 0.2 - 1.2 mg/dL Final      Lab Results   Component Value Date    [id number] 19.5 (L) 07/09/2025    [id number] 28.4 (L) 08/16/2024    [id number] 29.7 (L) 10/02/2019        There are no Patient Instructions on file for this visit.    Discussed with the patient and all questioned fully answered. She will call me if any problems arise.

## 2025-07-10 NOTE — ASSESSMENT & PLAN NOTE
Lab Results   Component Value Date    HGBA1C 5.8 (H) 07/09/2025   Current HbA1c represents acceptable control. Blood sugars demonstrating acceptable control  Continue current regimen with the following adjustments:  Increase Metformin to 1000mg daily  Patient pursuing GLP- 1 for weight loss  Advised to adhere to diabetic diet, and recommended staying active/exercising routinely.  Discussed symptoms and treatment of hypoglycemia.    Recommended routine follow-up with Ophthalmology and foot exams.   Ordered blood work to complete prior to next visit.    Orders:  •  metFORMIN (GLUCOPHAGE-XR) 500 mg 24 hr tablet; Take 2 tablets (1,000 mg total) by mouth daily with dinner  •  Comprehensive metabolic panel; Future  •  Hemoglobin A1C; Future

## 2025-07-21 ENCOUNTER — TELEPHONE (OUTPATIENT)
Dept: ENDOCRINOLOGY | Facility: CLINIC | Age: 45
End: 2025-07-21

## 2025-08-04 DIAGNOSIS — E11.9 TYPE 2 DIABETES MELLITUS WITHOUT COMPLICATION, WITHOUT LONG-TERM CURRENT USE OF INSULIN (HCC): ICD-10-CM

## 2025-08-05 DIAGNOSIS — E11.9 TYPE 2 DIABETES MELLITUS WITHOUT COMPLICATION, WITHOUT LONG-TERM CURRENT USE OF INSULIN (HCC): ICD-10-CM

## 2025-08-06 RX ORDER — ROSUVASTATIN CALCIUM 5 MG/1
5 TABLET, COATED ORAL DAILY
Qty: 90 TABLET | Refills: 1 | Status: SHIPPED | OUTPATIENT
Start: 2025-08-06

## 2025-08-07 RX ORDER — ROSUVASTATIN CALCIUM 5 MG/1
5 TABLET, COATED ORAL DAILY
Qty: 90 TABLET | Refills: 0 | OUTPATIENT
Start: 2025-08-07

## 2025-08-11 ENCOUNTER — HOSPITAL ENCOUNTER (OUTPATIENT)
Dept: RADIOLOGY | Facility: HOSPITAL | Age: 45
Discharge: HOME | End: 2025-08-11
Attending: NURSE PRACTITIONER
Payer: COMMERCIAL